# Patient Record
Sex: MALE | Race: BLACK OR AFRICAN AMERICAN | Employment: UNEMPLOYED | URBAN - METROPOLITAN AREA
[De-identification: names, ages, dates, MRNs, and addresses within clinical notes are randomized per-mention and may not be internally consistent; named-entity substitution may affect disease eponyms.]

---

## 2019-07-03 ENCOUNTER — OFFICE VISIT (OUTPATIENT)
Dept: ORTHOPEDIC SURGERY | Age: 39
End: 2019-07-03

## 2019-07-03 VITALS
WEIGHT: 180 LBS | RESPIRATION RATE: 18 BRPM | HEART RATE: 66 BPM | BODY MASS INDEX: 24.38 KG/M2 | OXYGEN SATURATION: 100 % | HEIGHT: 72 IN | DIASTOLIC BLOOD PRESSURE: 80 MMHG | SYSTOLIC BLOOD PRESSURE: 131 MMHG

## 2019-07-03 DIAGNOSIS — M47.817 LUMBOSACRAL SPONDYLOSIS WITHOUT MYELOPATHY: ICD-10-CM

## 2019-07-03 DIAGNOSIS — M51.26 HNP (HERNIATED NUCLEUS PULPOSUS), LUMBAR: ICD-10-CM

## 2019-07-03 DIAGNOSIS — M51.36 DDD (DEGENERATIVE DISC DISEASE), LUMBAR: ICD-10-CM

## 2019-07-03 DIAGNOSIS — M54.16 LUMBAR NEURITIS: ICD-10-CM

## 2019-07-03 DIAGNOSIS — M54.50 LUMBAR PAIN: Primary | ICD-10-CM

## 2019-07-03 NOTE — PROGRESS NOTES
[facility]  [address], [place] Sh[place]  Phone: [phone]  Fax: [phone]        INITIAL CONSULTATION      HISTORY OF PRESENT ILLNESS:  Nader Garcia is a 35 y.o. male whom is referred from Sunshine Garner MD  secondary to low back pain extending into the BLE in and L4 distribution to the knees since work injury in 2007, worse x 3 weeks. He rates his pain 4-9/10. Prior to the acute exacerbation, he states he would have rated his pain 4/10. Pt reports immediate onset of pain following injury. Originally through Workers comp but they have since settled. Pt denies h/o spinal surgery. Pt reports he had spinal injections 2 years ago. Pt went through physical therapy x 1 year ago with no relief. He previously did not tolerate Neurontin secondary to sedation. He did not tolerate Cymbalta secondary to hallucinations. Patient reports bladder incontinence x 1 year. Pt denies fever, weight loss, or skin changes. Lumbar spine MRI dated 8/14/16 reviewed. Per report, no central canal stenosis at any lumbar level. Multilevel degenerative changes and foraminal stenosis as described above. L5-S1: diffuse disc bulde, There is superimposed left posterolateral/foraminal disc protrusion. Moderate left foraminal stenosis. Protrusion abut the exiting left L5 nerve root. Appearance is similar to previous study. No central canal stenosis. The patient is RHD.  reviewed. Body mass index is 24.41 kg/m². PCP: Sunshine Garner MD    Past Medical History:   Diagnosis Date    Arthritis           Past Surgical History:   Procedure Laterality Date    HX VASECTOMY           Social History     Tobacco Use    Smoking status: Former Smoker    Smokeless tobacco: Never Used   Substance Use Topics    Alcohol use: Not Currently     Work status: The patient is unknown. Marital status: The patient is . No Known Allergies       History reviewed.  No pertinent family history. REVIEW OF SYSTEMS  Constitutional symptoms: Negative  Eyes: Negative  Ears, Nose, Throat, and Mouth: Negative  Cardiovascular: Negative  Respiratory: Negative  Genitourinary: Negative  Integumentary (Skin and/or breast): Negative  Musculoskeletal: Positive for low back pain, BLE pain  Extremities: Negative for edema. Endocrine/Rheumatologic: Negative  Hematologic/Lymphatic: Negative  Allergic/Immunologic: Negative  Psychiatric: Negative       PHYSICAL EXAMINATION  Visit Vitals  /80   Pulse 66   Resp 18   Ht 6' (1.829 m)   Wt 180 lb (81.6 kg)   SpO2 100%   BMI 24.41 kg/m²       CONSTITUTIONAL: NAD, A&O x 3  HEART: Regular rate and rhythm  ABDOMEN: Positive bowel sounds, soft, nontender, and nondistended  LUNGS: Clear to auscultation bilaterally. SKIN: Negative for rash. RANGE OF MOTION: The patient has full passive range of motion in all four extremities. SENSATION: Decreased sensation to light touch digits 1 and 5 RUE, L4 and S1 RLE. Sensation to light touch otherwise intact. MOTOR:   Straight Leg Raise: Negative, bilateral  Canela: Negative, bilateral  Deep tendon reflexes are 1+ at the brachioradialis, biceps, and triceps on the right, 2+ at the brachioradialis and triceps, 1+ at the biceps on the left. Deep tendon reflexes are 1+ at the knees and ankles bilaterally. Shoulder AB/Flex Elbow Flex Wrist Ext Elbow Ext Wrist Flex Hand Intrin Tone   Right +4/5 +4/5 +4/5 +4/5 +4/5 +4/5 +4/5   Left +4/5 +4/5 +4/5 +4/5 +4/5 +4/5 +4/5              Hip Flex Knee Ext Knee Flex Ankle DF GTE Ankle PF Tone   Right +4/5 +4/5 +4/5 +4/5 +4/5 +4/5 +4/5   Left +4/5 +4/5 +4/5 +4/5 +4/5 +4/5 +4/5   Ambulates with a single point cane. RADIOGRAPHS  Preliminary reading of lumbar plain films:  Mild disc space narrowing at L4 on L5 and L5 on S1. Small anterior osteophytes on L4- L5. No acute pathology identified. These are being sent out for official reading by Dr. Corby Clayton.      ASSESSMENT Diagnoses and all orders for this visit:    1. Lumbar pain  -     AMB POC XRAY, SPINE, LUMBOSACRAL; 2 O    2. HNP (herniated nucleus pulposus), lumbar    3. Lumbosacral spondylosis without myelopathy    4. Lumbar neuritis    5. DDD (degenerative disc disease), lumbar           IMPRESSIONS/RECOMMENDATIONS:  Patient presents today with low back pain extending into the BLE in and L4 distribution to the knees since work injury in 2007, worse x 3 weeks. I offered to start him on MDP. Pt declined. I will set him up for a new MRI of the lumbar spine. I advised patient to bring copies of films to next visit. I will have the patient sign a release of medical information to obtain office/block notes from Dr. Jeff Lousie. Multiple treatment options were discussed. Patient is neurologically intact. I will see the patient back following MRI. Written by Leonard Robles, as dictated by Terrie Oakley MD  I examined the patient, reviewed and agree with the note.

## 2019-07-03 NOTE — LETTER
7/3/19 Patient: Yadiel Marie YOB: 1980 Date of Visit: 7/3/2019 Sabi Grace MD 
[address] [place] [address] VIA Facsimile: [phone] Dear Sabi Grace MD, Thank you for referring Mr. Yadiel Marie to [address] [place] [address] for evaluation. My notes for this consultation are attached. If you have questions, please do not hesitate to call me. I look forward to following your patient along with you. Sincerely, Germain Huntley MD

## 2019-07-05 ENCOUNTER — DOCUMENTATION ONLY (OUTPATIENT)
Dept: ORTHOPEDIC SURGERY | Age: 39
End: 2019-07-05

## 2019-07-05 NOTE — PROGRESS NOTES
MRI Lumbar Spine without contrast is scheduled at [facility], [phone], on 07/10/19, arrive 2:45PM, test 3:00PM. Juan Pittman pre-authorization [id number], effective 07/05/19-09/03/19

## 2019-07-31 ENCOUNTER — OFFICE VISIT (OUTPATIENT)
Dept: ORTHOPEDIC SURGERY | Age: 39
End: 2019-07-31

## 2019-07-31 VITALS
SYSTOLIC BLOOD PRESSURE: 136 MMHG | OXYGEN SATURATION: 98 % | TEMPERATURE: 99.2 F | HEIGHT: 72 IN | BODY MASS INDEX: 24.52 KG/M2 | DIASTOLIC BLOOD PRESSURE: 83 MMHG | RESPIRATION RATE: 18 BRPM | WEIGHT: 181 LBS | HEART RATE: 85 BPM

## 2019-07-31 DIAGNOSIS — M47.817 LUMBOSACRAL SPONDYLOSIS WITHOUT MYELOPATHY: Primary | ICD-10-CM

## 2019-07-31 DIAGNOSIS — M51.26 OTHER INTERVERTEBRAL DISC DISPLACEMENT, LUMBAR REGION: ICD-10-CM

## 2019-07-31 DIAGNOSIS — M54.16 LUMBAR NEURITIS: ICD-10-CM

## 2019-07-31 NOTE — PROGRESS NOTES
[facility]  [address], [place]   Phone: [phone]  Fax: [phone]        PROGRESS NOTE      HISTORY OF PRESENT ILLNESS:  The patient is a 35 y.o. male and was seen today for follow up of low back pain extending into the BLE in and L4 distribution to the knees since work injury in 2007, worse x 3 weeks. Prior to the acute exacerbation, he reports his chronic baseline level of pain 4/10. Pt reports immediate onset of pain following injury. Originally through Workers comp but they have since settled. Pt denies h/o spinal surgery. Pt reports he had spinal injections 2 years ago. Pt went through physical therapy x 1 year ago with no relief. He previously did not tolerate Neurontin secondary to sedation. He did not tolerate Cymbalta secondary to hallucinations. Patient reports bladder incontinence x 1 year. Pt denies fever, weight loss, or skin changes. The patient is RHD. Lumbar spine MRI dated 8/14/16 reviewed. Per report, no central canal stenosis at any lumbar level. Multilevel degenerative changes and foraminal stenosis as described above. L5-S1: diffuse disc bulge, There is superimposed left posterolateral/foraminal disc protrusion. Moderate left foraminal stenosis. Protrusion abut the exiting left L5 nerve root. Appearance is similar to previous study. No central canal stenosis. Preliminary reading of lumbar plain films: Mild disc space narrowing at L4 on L5 and L5 on S1. Small anterior osteophytes on L4- L5. No acute pathology identified. At his last clinical appointment, patient presented with low back pain extending into the BLE in and L4 distribution to the knees since work injury in 2007, worse x 3 weeks. I offered to start him on MDP. Pt declined. I set him up for a new MRI of the lumbar spine. I had the patient sign a release of medical information to obtain office/block notes from Dr. Garry Emery.       The patient returns today low back pain extending into the RLE in and S1 distribution to the knee. He rates his pain 6/10, previously 4-9/10. Pt reports bladder incontinence x 1 year. Note from Love Kasper dated 12/20/08 indicating patient was seen with c/o lower back pain. Indicated he had improvement with PT. Note from Dr. Faustino Regan dated 5/18/16 indicating patient was seen with c/o back and leg pain. Indicated he was seen for a second opinion for his back and leg pain. Previously followed by [facility]. Original work injury 7/27/08. Indicated he had blocks x2 in 2013, x1 in 2014, and x1 in 2015. Indicated he had relief with those injections. Note from Dr. Fuastino Regan dated 8/16/16 indicating patient was seen with c/o low back and leg pain. Indicated his MRI showed evidence of left sided lumbar radiculopathy. Pt underwent left sided SNRB on 4/9/15 with Dr. Renu Solomon. Pt underwent right sided SNRB on 8/5/14 with Dr. Renu Solomon. Pt underwent left sided L4 SNRB on 12/12/13 with Dr. Renu Solomon. Pt underwent left sided L4 SNRB on 5/16/13 with Dr. Renu Solomon. Lumbar spine MRI dated 7/10/19 reviewed. Per report, degenerative changes in the 3 lower most lumbar levels as describes with no spinal stenosis.  reviewed. Body mass index is 24.55 kg/m².       PCP: Saeed Pearce MD      Past Medical History:   Diagnosis Date    Arthritis         Social History     Socioeconomic History    Marital status:      Spouse name: Not on file    Number of children: Not on file    Years of education: Not on file    Highest education level: Not on file   Occupational History    Not on file   Social Needs    Financial resource strain: Not on file    Food insecurity:     Worry: Not on file     Inability: Not on file    Transportation needs:     Medical: Not on file     Non-medical: Not on file   Tobacco Use    Smoking status: Former Smoker    Smokeless tobacco: Never Used   Substance and Sexual Activity    Alcohol use: Not Currently    Drug use: Not Currently    Sexual activity: Not on file   Lifestyle    Physical activity:     Days per week: Not on file     Minutes per session: Not on file    Stress: Not on file   Relationships    Social connections:     Talks on phone: Not on file     Gets together: Not on file     Attends Rastafarian service: Not on file     Active member of club or organization: Not on file     Attends meetings of clubs or organizations: Not on file     Relationship status: Not on file    Intimate partner violence:     Fear of current or ex partner: Not on file     Emotionally abused: Not on file     Physically abused: Not on file     Forced sexual activity: Not on file   Other Topics Concern     Service Not Asked    Blood Transfusions Not Asked    Caffeine Concern Not Asked    Occupational Exposure Not Asked    Hobby Hazards Not Asked    Sleep Concern Not Asked    Stress Concern Not Asked    Weight Concern Not Asked    Special Diet Not Asked    Back Care Not Asked    Exercise Not Asked    Bike Helmet Not Asked   [address] Not Asked    Self-Exams Not Asked   Social History Narrative    Not on file           No Known Allergies       PHYSICAL EXAMINATION    Visit Vitals  /83   Pulse 85   Temp 99.2 °F (37.3 °C)   Resp 18   Ht 6' (1.829 m)   Wt 181 lb (82.1 kg)   SpO2 98%   BMI 24.55 kg/m²       CONSTITUTIONAL: NAD, A&O x 3  SENSATION: Decreased sensation to light touch circumferentially RLE. Sensation to light touch otherwise intact. NEURO: Dell's is negative bilaterally. RANGE OF MOTION: The patient has full passive range of motion in all four extremities. MOTOR:  Straight Leg Raise: Negative, bilateral   Positive Mikal                Hip Flex Knee Ext Knee Flex Ankle DF GTE Ankle PF Tone   Right +4/5 +4/5 +4/5 +4/5 +4/5 +4/5 +4/5   Left +4/5 +4/5 +4/5 +4/5 +4/5 +4/5 +4/5     Ambulates with a single point cane. ASSESSMENT   Diagnoses and all orders for this visit:    1.  Lumbosacral spondylosis without myelopathy    2. Lumbar neuritis    3. Other intervertebral disc displacement, lumbar region          IMPRESSION AND PLAN:  The patient returns today low back pain extending into the RLE in and S1 distribution to the knee. Upon reviewing his films, I did not appreciate any spinal pathology to account for his right-sided sxs or bladder incontinence. I recommend he f/u with Lawanda Hansen MD for his bladder incontinence. His lower back pain may be due to SI joint as he had a positive drew. Patient wished to proceed with blocks at this time. I will order right SI joint Patient is neurologically intact. I will see the patient back following block. Written by Jesús Sprague, as dictated by Darek Reed MD  I examined the patient, reviewed and agree with the note.

## 2019-07-31 NOTE — LETTER
7/31/19 Patient: Lul Chaudhari YOB: 1980 Date of Visit: 7/31/2019 Kiesha Jin MD 
[address] [place] [address] VIA Facsimile: [phone] Dear Kiesha Jin MD, Thank you for referring Mr. Lul Chaudhari to [address] [place] [address] for evaluation. My notes for this consultation are attached. If you have questions, please do not hesitate to call me. I look forward to following your patient along with you. Sincerely, Gasper Yeager MD

## 2019-08-22 ENCOUNTER — APPOINTMENT (OUTPATIENT)
Dept: GENERAL RADIOLOGY | Age: 39
End: 2019-08-22
Attending: PHYSICAL MEDICINE & REHABILITATION
Payer: COMMERCIAL

## 2019-08-22 ENCOUNTER — HOSPITAL ENCOUNTER (OUTPATIENT)
Age: 39
Setting detail: OUTPATIENT SURGERY
Discharge: HOME | End: 2019-08-22
Attending: PHYSICAL MEDICINE & REHABILITATION | Admitting: PHYSICAL MEDICINE & REHABILITATION
Payer: COMMERCIAL

## 2019-08-22 VITALS
BODY MASS INDEX: 24.52 KG/M2 | TEMPERATURE: 98.6 F | WEIGHT: 181 LBS | OXYGEN SATURATION: 96 % | HEIGHT: 72 IN | HEART RATE: 71 BPM | DIASTOLIC BLOOD PRESSURE: 74 MMHG | RESPIRATION RATE: 16 BRPM | SYSTOLIC BLOOD PRESSURE: 116 MMHG

## 2019-08-22 PROCEDURE — 74011250636 HC RX REV CODE- 250/636: Performed by: PHYSICAL MEDICINE & REHABILITATION

## 2019-08-22 PROCEDURE — 76010000009 HC PAIN MGT 0 TO 30 MIN PROC: Performed by: PHYSICAL MEDICINE & REHABILITATION

## 2019-08-22 PROCEDURE — 74011250637 HC RX REV CODE- 250/637: Performed by: PHYSICAL MEDICINE & REHABILITATION

## 2019-08-22 PROCEDURE — 77030039433 HC TY MYLEOGRAM BD -B: Performed by: PHYSICAL MEDICINE & REHABILITATION

## 2019-08-22 PROCEDURE — 77030003676 HC NDL SPN MPRI -A: Performed by: PHYSICAL MEDICINE & REHABILITATION

## 2019-08-22 RX ORDER — DIAZEPAM 5 MG/1
5-20 TABLET ORAL ONCE
Status: COMPLETED | OUTPATIENT
Start: 2019-08-22 | End: 2019-08-22

## 2019-08-22 RX ORDER — LIDOCAINE HYDROCHLORIDE 10 MG/ML
INJECTION, SOLUTION EPIDURAL; INFILTRATION; INTRACAUDAL; PERINEURAL AS NEEDED
Status: DISCONTINUED | OUTPATIENT
Start: 2019-08-22 | End: 2019-08-22 | Stop reason: HOSPADM

## 2019-08-22 RX ORDER — DEXAMETHASONE SODIUM PHOSPHATE 100 MG/10ML
INJECTION INTRAMUSCULAR; INTRAVENOUS AS NEEDED
Status: DISCONTINUED | OUTPATIENT
Start: 2019-08-22 | End: 2019-08-22 | Stop reason: HOSPADM

## 2019-08-22 RX ADMIN — DIAZEPAM 10 MG: 5 TABLET ORAL at 12:30

## 2019-08-22 NOTE — DISCHARGE INSTRUCTIONS
[facility]   (NESS)  Dr. Shaheen Atwood, Dr. Jh Rosenberg, Dr. Raul Castellanos not drive a car, operate heavy machinery or dangerous equipment for 24 hours. * Activity as tolerated; rest for the remainder of the day. * Resume pre-block medications including those for your family doctor. * Do not drink alcoholic beverages for 24 hours. Alcohol and the medications you have received may interact and cause an adverse reaction. * You may feel better this evening and worse tomorrow, as the numbing medications wears off and the steroid has yet to begin to work. After 48 hrs the steroid should begin to release bringing you relief. * You may shower this evening and remove any bandages. * Avoid hot tubs and heating pads for 24 hours. You may use cold packs on the procedure site as tolerated for the first 24 hours. * If a headache develops, drink plenty of fluids and rest.  Take over the counter medications for headache if needed. If the headache continues longer than 24 hours, call MD at the [address] [place] [address]. [phone]    * Continue taking pain medications as needed. * You may resume your regular diet if tolerated. Otherwise, start with sips of water and advance slowly. * If Diabetic: check your blood sugar three times a day for the next 3 days. If your sugar is greater than 300 call your family doctor. If your sugar is greater than 400, have someone transport you to the nearest Emergency Room. * If you experience any of the following problems, Please Call the [address] [place] [address] at [phone].         * Shortness of Breath    * Fever of 101 or higher    * Nausea / Vomiting    * Severe Headache    * Weakness or numbness in arms or legs that is not      resolving    * Prolonged increase in pain greater than 4 days      DISCHARGE SUMMARY from Nurse      PATIENT INSTRUCTIONS:    After oral sedation, for 24 hours or while taking prescription Narcotics:  · Limit your activities  · Do not drive and operate hazardous machinery  · Do not make important personal or business decisions  · Do  not drink alcoholic beverages  · If you have not urinated within 8 hours after discharge, please contact your surgeon on call. Report the following to your surgeon:  · Excessive pain, swelling, redness or odor of or around the surgical area  · Temperature over 101  · Nausea and vomiting lasting longer than 4 hours or if unable to take medications  · Any signs of decreased circulation or nerve impairment to extremity: change in color, persistent  numbness, tingling, coldness or increase pain  · Any questions            What to do at Home:  Recommended activity: Activity as tolerated, NO DRIVING FOR 12 Hours post injection          *  Please give a list of your current medications to your Primary Care Provider. *  Please update this list whenever your medications are discontinued, doses are      changed, or new medications (including over-the-counter products) are added. *  Please carry medication information at all times in case of emergency situations. These are general instructions for a healthy lifestyle:    No smoking/ No tobacco products/ Avoid exposure to second hand smoke    Surgeon General's Warning:  Quitting smoking now greatly reduces serious risk to your health. Obesity, smoking, and sedentary lifestyle greatly increases your risk for illness    A healthy diet, regular physical exercise & weight monitoring are important for maintaining a healthy lifestyle    You may be retaining fluid if you have a history of heart failure or if you experience any of the following symptoms:  Weight gain of 3 pounds or more overnight or 5 pounds in a week, increased swelling in our hands or feet or shortness of breath while lying flat in bed.   Please call your doctor as soon as you notice any of these symptoms; do not wait until your next office visit. Recognize signs and symptoms of STROKE:    F-face looks uneven    A-arms unable to move or move unevenly    S-speech slurred or non-existent    T-time-call 911 as soon as signs and symptoms begin-DO NOT go       Back to bed or wait to see if you get better-TIME IS BRAIN.

## 2019-08-22 NOTE — PROCEDURES
Procedure Note    Patient Name: Mandie Smith. Date of Procedure: August 22, 2019    Preoperative Diagnosis: Sacrilitis    Post Operative Diagnosis: same    Procedure: SI Joint Injection right     Consent: Informed consent was obtained prior to the procedure. The patient was given the opportunity to ask questions regarding the procedure and its associated risks. In addition to the potential risks associated with the procedure itself, the patient was informed both verbally and in writing of potential side effects of the use of glucocorticoids. The patient appeared to comprehend the informed consent and desired to have the procedure perfored. Procedure: The patient was placed in the prone position on the flouroscopy table and the back was prepped and draped in the usual sterile manner. A #22 gauge spinal needle was then advanced to lie within the SI joint after local Lidocaine 1% injection. A total of 30 mg of preservative free Dexamethasone and 4 cc of Lidocaine was introduced into the SI joint. The injection area was cleaned and bandaids applied. No excessive bleeding was noted. Patient dressed and was discharged to home with instructions. Discussion: The patient tolerated the procedure well.      Norm Cordero MD  August 22, 2019

## 2019-08-23 ENCOUNTER — TELEPHONE (OUTPATIENT)
Dept: ORTHOPEDIC SURGERY | Age: 39
End: 2019-08-23

## 2019-08-23 NOTE — TELEPHONE ENCOUNTER
08-   FORM WAS BROUGHT IN , SAID IT WAS FOR PATIENTS ATTORNEYS. PLEASE FILL OUT AND PATIENT WILL  AT HIS APPOINTMENT.

## 2019-08-23 NOTE — TELEPHONE ENCOUNTER
Patient called stating that he needed to talk to Dr. Meri Francis about pain medication. He asked to receive a call back at [phone].

## 2019-08-26 ENCOUNTER — DOCUMENTATION ONLY (OUTPATIENT)
Dept: ORTHOPEDIC SURGERY | Age: 39
End: 2019-08-26

## 2019-08-26 NOTE — TELEPHONE ENCOUNTER
Called patient regarding previous message. Patient states he has already spoken to Dr. Susan Sanchez.

## 2019-08-29 ENCOUNTER — TELEPHONE (OUTPATIENT)
Dept: ORTHOPEDIC SURGERY | Age: 39
End: 2019-08-29

## 2019-08-29 NOTE — TELEPHONE ENCOUNTER
Patient is aware he can take a shower at what ever temperature he would like and it will take up to 2-3 weeks for benefit.

## 2019-08-29 NOTE — TELEPHONE ENCOUNTER
He can take hot showers. Temperature should be what is comfortable for him.   The block can take 2-3 weeks for full effect

## 2019-08-29 NOTE — TELEPHONE ENCOUNTER
Patient is calling as he has some questions reference can he take hot shower when he started with some pain. What temperature of shower should he take? Patient has had some pain and how long does it take for the block to take full affect? Does have appt  09/04 with Dr. Karlo Barrios at [facility].   Please call patient back at [phone]

## 2019-09-04 ENCOUNTER — OFFICE VISIT (OUTPATIENT)
Dept: ORTHOPEDIC SURGERY | Age: 39
End: 2019-09-04

## 2019-09-04 VITALS
BODY MASS INDEX: 25.33 KG/M2 | RESPIRATION RATE: 18 BRPM | WEIGHT: 187 LBS | HEART RATE: 60 BPM | OXYGEN SATURATION: 100 % | SYSTOLIC BLOOD PRESSURE: 129 MMHG | HEIGHT: 72 IN | DIASTOLIC BLOOD PRESSURE: 80 MMHG

## 2019-09-04 DIAGNOSIS — M54.50 LUMBAR PAIN: Primary | ICD-10-CM

## 2019-09-04 RX ORDER — KETOROLAC TROMETHAMINE 10 MG/1
TABLET, FILM COATED ORAL
COMMUNITY
End: 2021-12-17

## 2019-09-04 NOTE — LETTER
9/4/19 Patient: Dheeraj Correa YOB: 1980 Date of Visit: 9/4/2019 Soco Clayton MD 
[address] [place] [address] VIA Facsimile: [phone] Dear Soco Clayton MD, Thank you for referring Mr. Dheeraj Correa to [address] [place] [address] for evaluation. My notes for this consultation are attached. If you have questions, please do not hesitate to call me. I look forward to following your patient along with you. Sincerely, Salud Rivera MD

## 2019-09-04 NOTE — PROGRESS NOTES
[facility]  [address], [place]   Phone: [phone]  Fax: [phone]        PROGRESS NOTE      HISTORY OF PRESENT ILLNESS:  The patient is a 35 y.o. male and was seen today for follow up of low back pain extending into the RLE in and S1 distribution to the knee. Initially had c/o low back pain extending into the BLE in and L4 distribution to the knees since work injury in 2007, worse x 3 weeks. Prior to the acute exacerbation, he reports his chronic baseline level of pain 4/10. Pt reports immediate onset of pain following injury. Originally through Workers comp but they have since settled. Pt denies h/o spinal surgery. Pt reports he had spinal injections 2 years ago. Pt went through physical therapy x 1 year ago with no relief. He previously did not tolerate Neurontin secondary to sedation. He did not tolerate Cymbalta secondary to hallucinations. Patient reports bladder incontinence x 1 year. Pt denies fever, weight loss, or skin changes. The patient is RHD. Note from Vitaliy Cruz dated 12/20/08 indicating patient was seen with c/o lower back pain. Indicated he had improvement with PT. Note from Dr. Beth Rodriguez dated 5/18/16 indicating patient was seen with c/o back and leg pain. Indicated he was seen for a second opinion for his back and leg pain. Previously followed by [facility]. Original work injury 7/27/08. Indicated he had blocks x2 in 2013, x1 in 2014, and x1 in 2015. Indicated he had relief with those injections. Note from Dr. Beth Rodriguez dated 8/16/16 indicating patient was seen with c/o low back and leg pain. Indicated his MRI showed evidence of left sided lumbar radiculopathy. Pt underwent left sided SNRB on 4/9/15 with Dr. Giancarlo Platt. Pt underwent right sided SNRB on 8/5/14 with Dr. Giancarlo Platt. Pt underwent left sided L4 SNRB on 12/12/13 with Dr. Giancarlo Platt. Pt underwent left sided L4 SNRB on 5/16/13 with Dr. Giancarlo Platt.  Preliminary reading of lumbar plain films: Mild disc space narrowing at L4 on L5 and L5 on S1. Small anterior osteophytes on L4- L5. No acute pathology identified. Lumbar spine MRI dated 7/10/19 reviewed. Per report, degenerative changes in the 3 lower most lumbar levels as describes with no spinal stenosis. At his last clinical appointment, the patient returned with low back pain extending into the RLE in and S1 distribution to the knee. Upon reviewing his films, I did not appreciate any spinal pathology to account for his right-sided sxs or bladder incontinence. I recommended he f/u with Giovanni Cockayne, MD for his bladder incontinence. His lower back pain may be due to SI joint as he had a positive Niurka Limes. Patient wished to proceed with blocks at that time. I order right SI joint injection. The patient returns today with pain location and distribution remain unchanged. He rates his pain 3/10, previously 6/10. Pt reports his decrease in pain was due to Toradol he has been taking, not the SI joint injection. Pt has not been able to schedule an appointment with Giovanni Cockayne, MD for his bladder incontinence. He continues to report bladder incontinence x 1 year. Pt underwent right SI joint injection on 8/22/19 with no relief.  reviewed. Body mass index is 25.36 kg/m².       PCP: Giovanni Cockayne, MD      Past Medical History:   Diagnosis Date    Arthritis         Social History     Socioeconomic History    Marital status:      Spouse name: Not on file    Number of children: Not on file    Years of education: Not on file    Highest education level: Not on file   Occupational History    Not on file   Social Needs    Financial resource strain: Not on file    Food insecurity:     Worry: Not on file     Inability: Not on file    Transportation needs:     Medical: Not on file     Non-medical: Not on file   Tobacco Use    Smoking status: Former Smoker    Smokeless tobacco: Never Used   Substance and Sexual Activity  Alcohol use: Not Currently    Drug use: Not Currently    Sexual activity: Not on file   Lifestyle    Physical activity:     Days per week: Not on file     Minutes per session: Not on file    Stress: Not on file   Relationships    Social connections:     Talks on phone: Not on file     Gets together: Not on file     Attends Quaker service: Not on file     Active member of club or organization: Not on file     Attends meetings of clubs or organizations: Not on file     Relationship status: Not on file    Intimate partner violence:     Fear of current or ex partner: Not on file     Emotionally abused: Not on file     Physically abused: Not on file     Forced sexual activity: Not on file   Other Topics Concern     Service Not Asked    Blood Transfusions Not Asked    Caffeine Concern Not Asked    Occupational Exposure Not Asked   [place] Hazards Not Asked    Sleep Concern Not Asked    Stress Concern Not Asked    Weight Concern Not Asked    Special Diet Not Asked    Back Care Not Asked    Exercise Not Asked    Bike Helmet Not Asked   [address] Not Asked    Self-Exams Not Asked   Social History Narrative    Not on file       Current Outpatient Medications   Medication Sig Dispense Refill    ketorolac (TORADOL) 10 mg tablet Take  by mouth. Allergies   Allergen Reactions    Banana Hives          PHYSICAL EXAMINATION    Visit Vitals  /80   Pulse 60   Resp 18   Ht 6' (1.829 m)   Wt 84.8 kg (187 lb)   SpO2 100%   BMI 25.36 kg/m²       CONSTITUTIONAL: NAD, A&O x 3  SENSATION: Intact to light touch throughout  RANGE OF MOTION: The patient has full passive range of motion in all four extremities. MOTOR:  Straight Leg Raise: Negative, bilateral               Hip Flex Knee Ext Knee Flex Ankle DF GTE Ankle PF Tone   Right +4/5 +4/5 +4/5 +4/5 +4/5 +4/5 +4/5   Left +4/5 +4/5 +4/5 +4/5 +4/5 +4/5 +4/5       ASSESSMENT   Diagnoses and all orders for this visit:    1.  Lumbar pain  - REFERRAL TO PAIN MANAGEMENT          IMPRESSION AND PLAN:  Patient is s/p a right SI joint injection, noting no relief. Based on diagnostic testing, I did not appreciate a spinal pathology to account for his sxs. I will refer him to pain management. I will also set him up for an appointment with Noni Davis MD for the bladder incontinence. Patient is neurologically intact. I will see the patient back prn. Written by Nguyen Jimenez, as dictated by Luke Belle MD  I examined the patient, reviewed and agree with the note.

## 2020-06-01 ENCOUNTER — OFFICE VISIT (OUTPATIENT)
Dept: ORTHOPEDIC SURGERY | Age: 40
End: 2020-06-01

## 2020-06-01 VITALS
WEIGHT: 188.2 LBS | DIASTOLIC BLOOD PRESSURE: 84 MMHG | HEART RATE: 83 BPM | BODY MASS INDEX: 25.49 KG/M2 | SYSTOLIC BLOOD PRESSURE: 114 MMHG | HEIGHT: 72 IN | OXYGEN SATURATION: 97 %

## 2020-06-01 DIAGNOSIS — M47.817 LUMBOSACRAL SPONDYLOSIS WITHOUT MYELOPATHY: ICD-10-CM

## 2020-06-01 DIAGNOSIS — M51.36 DDD (DEGENERATIVE DISC DISEASE), LUMBAR: ICD-10-CM

## 2020-06-01 DIAGNOSIS — M54.50 LOW BACK PAIN AT MULTIPLE SITES: Primary | ICD-10-CM

## 2020-06-01 DIAGNOSIS — M54.16 LUMBAR NEURITIS: ICD-10-CM

## 2020-06-01 RX ORDER — PREDNISONE 20 MG/1
TABLET ORAL
COMMUNITY
Start: 2020-05-15 | End: 2020-11-11 | Stop reason: ALTCHOICE

## 2020-06-01 RX ORDER — GABAPENTIN 300 MG/1
300 CAPSULE ORAL 3 TIMES DAILY
Qty: 90 CAP | Refills: 1 | Status: SHIPPED | OUTPATIENT
Start: 2020-06-01 | End: 2021-12-17

## 2020-06-01 RX ORDER — GABAPENTIN 300 MG/1
CAPSULE ORAL
COMMUNITY
Start: 2020-05-15 | End: 2020-08-21 | Stop reason: SDUPTHER

## 2020-06-01 NOTE — PROGRESS NOTES
[facility]  [address], [place]   Phone: [phone]  Fax: [phone]        PROGRESS NOTE      HISTORY OF PRESENT ILLNESS:  The patient is a 35 y.o. male and was seen today for follow up of low back pain radiating into the RLE in an L5-S1 distribution to the knee. Initially had c/o low back pain radiating into the BLE in and L4 distribution to the knees since work injury in 2007, worse x 3 weeks. Prior to the acute exacerbation, he reports his chronic baseline level of pain 4/10. Pt reports immediate onset of pain following injury. Originally through Workers comp but they have since settled. Pt treats with Toradol, with relief. Pt denies h/o spinal surgery. Pt reports he had spinal injections x 2017+. Pt underwent right SI joint injection on 8/22/19 with no relief. Pt went through physical therapy x 1 year ago with no relief. He previously did not tolerate NEURONTIN secondary to sedation. He did not tolerate Cymbalta secondary to hallucinations. Patient reports an episode of bladder incontinence x 7/2019. He later clarified he could not make it to the bathroom in time. Pt denies fever, weight loss, or skin changes. The patient is RHD. Note from Katlin Contreras NP dated 12/20/08 indicating patient was seen with c/o lower back pain. Indicated he had improvement with PT. Note from Dr. Blanche Jenkins 5/18/16 indicating patient was seen with c/o back and leg pain. Indicated he was seen for a second opinion for his back and leg pain. Previously followed [facility]. Original work injury 7/27/08. Indicated he had blocks x2 in 2013, x1 in 2014, and x1 in 2015. Indicated he had relief with those injections. Note from Dr. Blanche Jenkins 8/16/16 indicating patient was seen with c/o low back and leg pain. Indicated his MRI showed evidence of left sided lumbar radiculopathy. Pt underwent left sided SNRB on 4/9/15 with Dr. Taqueria Bryant.  Pt underwent right sided Nolberto Juarez. Pt underwent left sided L4 SNRB on 12/12/13 with Dr. Zack Juarez. Pt underwent left sided L4 SNRB on 5/16/13 with Dr. Jitendra Meraz reading of lumbar plain films: Mild disc space narrowing at L4 on L5 and L5 on S1. Small anterior osteophytes on L4- L5. No acute pathology identified. Lumbar spine MRI dated 7/10/19 reviewed. Per report, degenerative changes in the 3 lower most lumbar levels as describes with no spinal stenosis. At his last clinical appointment, I referred him to Dr. Maye Barrera for pain management. I also set him up for an appointment with Michael Zhu MD for the bladder incontinence.       The patient returns today with pain location and distribution remains unchanged. He rates his pain 5/10, previously 3/10. He indicates a severe episode of pain on 5/15/2020 without trauma. Prior to this episode he indicates his baseline pain level was 2/10. His pain is not exacerbated positionally. Since last visit, he did not report to Dr. Benitez [facility] pain management as he experienced some improvement in his symptoms. Pt denies h/o hip surgery. Pt denies change in bowel or bladder habits. ER note from Dr. Dov Mccarty dated 5/15/20 indicating patient presented for back pain radiating into the BLE (L>R), which started 1 day prior. Indicated he previously was on Neurontin, but noted he ran out x 1 year ago. He previously treated with oxycodone and acetaminophen without relief. Restarted his Neurontin at 300 mg qhs, provided him a Rx for prednisone, and treated him with a Toradol injection.  reviewed. Body mass index is 25.52 kg/m².     PCP: Michael Zhu MD      Past Medical History:   Diagnosis Date    Arthritis         Social History     Socioeconomic History    Marital status:      Spouse name: Not on file    Number of children: Not on file    Years of education: Not on file    Highest education level: Not on file   Occupational History    Not on file Social Needs    Financial resource strain: Not on file    Food insecurity     Worry: Not on file     Inability: Not on file    Transportation needs     Medical: Not on file     Non-medical: Not on file   Tobacco Use    Smoking status: Former Smoker    Smokeless tobacco: Never Used   Substance and Sexual Activity    Alcohol use: Not Currently    Drug use: Not Currently    Sexual activity: Not on file   Lifestyle    Physical activity     Days per week: Not on file     Minutes per session: Not on file    Stress: Not on file   Relationships    Social connections     Talks on phone: Not on file     Gets together: Not on file     Attends Hinduism service: Not on file     Active member of club or organization: Not on file     Attends meetings of clubs or organizations: Not on file     Relationship status: Not on file    Intimate partner violence     Fear of current or ex partner: Not on file     Emotionally abused: Not on file     Physically abused: Not on file     Forced sexual activity: Not on file   Other Topics Concern     Service Not Asked    Blood Transfusions Not Asked    Caffeine Concern Not Asked    Occupational Exposure Not Asked   [place] Hazards Not Asked    Sleep Concern Not Asked    Stress Concern Not Asked    Weight Concern Not Asked    Special Diet Not Asked    Back Care Not Asked    Exercise Not Asked    Bike Helmet Not Asked    Seat Belt Not Asked    Self-Exams Not Asked   Social History Narrative    Not on file       Current Outpatient Medications   Medication Sig Dispense Refill    predniSONE (DELTASONE) 20 mg tablet TAKE 2 TABLETS BY MOUTH ONCE DAILY FOR 5 DAYS      gabapentin (NEURONTIN) 300 mg capsule TAKE 1 CAPSULE BY MOUTH AT BEDTIME FOR 10 DAYS      ketorolac (TORADOL) 10 mg tablet Take  by mouth.          Allergies   Allergen Reactions    Banana Hives          PHYSICAL EXAMINATION    Visit Vitals  /84 (BP 1 Location: Left arm, BP Patient Position: Sitting)   Pulse 83   Ht 6' (1.829 m)   Wt 188 lb 3.2 oz (85.4 kg)   SpO2 97%   BMI 25.52 kg/m²       CONSTITUTIONAL: NAD, A&O x 3  SENSATION: Intact to light touch throughout  RANGE OF MOTION: The patient has full passive range of motion in all four extremities. MOTOR:  Straight Leg Raise: Negative, bilateral    YUNIER SIGN: questionably positive, right   Mild increase in discomfort with increased ROM of the RLE  No increased pain with direct palpation of the SI joint    Ambulates with a single point cane     Hip Flex Knee Ext Knee Flex Ankle DF GTE Ankle PF Tone   Right +4/5 +4/5 +4/5 +4/5 +4/5 +4/5 +4/5   Left +4/5 +4/5 +4/5 +4/5 +4/5 +4/5 +4/5   RADIOGRAPHS  Preliminary reading of L Spine plain films dated 6/1/2020 revealed:  Mild disc space narrowing at L5-S1. Small anterior osteophytes noted at L5. No acute pathology identified. These are being sent out for official reading by Dr. Carolyn Kohler. ASSESSMENT   Diagnoses and all orders for this visit:    1. Low back pain at multiple sites  -     AMB POC XRAY, SPINE, LUMBOSACRAL; 2 O    2. Lumbosacral spondylosis without myelopathy    3. Lumbar neuritis    4. DDD (degenerative disc disease), lumbar      IMPRESSION AND PLAN:  Patient returns to the office today with c/o low back pain radiating into the RLE in an L5-S1 distribution to the knee. Multiple treatment options were discussed. I will refer him to neurology for a RLE EMG. He previously tolerated Neurontin with some relief, I will restart his Neurontin at  300 mg TID. Patient advised to call the office if intolerant to medication. Patient is neurologically intact. I will see the patient back following EMG or earlier if needed. Written by Stuart Anthony, as dictated by Nora Correia MD  I examined the patient, reviewed and agree with the note.

## 2020-06-01 NOTE — LETTER
6/1/20 Patient: Ld Schulz YOB: 1980 Date of Visit: 6/1/2020 Starla [place] MD 
[address] [place], [place] [id number] VIA Facsimile: [phone] Dear Starla [place] MD, Thank you for referring Mr. Ld Schulz to [address] for evaluation. My notes for this consultation are attached. If you have questions, please do not hesitate to call me. I look forward to following your patient along with you. Sincerely, Delfin Partida MD

## 2020-06-08 ENCOUNTER — DOCUMENTATION ONLY (OUTPATIENT)
Dept: ORTHOPEDIC SURGERY | Age: 40
End: 2020-06-08

## 2020-06-08 NOTE — PROGRESS NOTES
EMG RLE is scheduled with Dr. Bryan Ortiz, [address] [place] [address], [address], [phone], on 07/02/20, arrive 2:00PM, test 2:30PM.

## 2020-07-23 ENCOUNTER — TELEPHONE (OUTPATIENT)
Dept: ORTHOPEDIC SURGERY | Age: 40
End: 2020-07-23

## 2020-07-23 NOTE — TELEPHONE ENCOUNTER
Reached voice mail identified as \"Brother Bryce Walters" so left detailed message with my direct number. Attempting to schedule EMG RLE. Referral sent to Dr. Adrianne Truong office but they no longer accept his insurance. Thank you.

## 2020-08-19 ENCOUNTER — OFFICE VISIT (OUTPATIENT)
Dept: ORTHOPEDIC SURGERY | Age: 40
End: 2020-08-19

## 2020-08-19 VITALS
RESPIRATION RATE: 14 BRPM | SYSTOLIC BLOOD PRESSURE: 125 MMHG | DIASTOLIC BLOOD PRESSURE: 75 MMHG | OXYGEN SATURATION: 100 % | HEIGHT: 72 IN | BODY MASS INDEX: 27.09 KG/M2 | TEMPERATURE: 98.9 F | HEART RATE: 63 BPM | WEIGHT: 200 LBS

## 2020-08-19 DIAGNOSIS — R20.2 NUMBNESS AND TINGLING OF RIGHT LOWER EXTREMITY: Primary | ICD-10-CM

## 2020-08-19 DIAGNOSIS — R20.0 NUMBNESS AND TINGLING OF RIGHT LOWER EXTREMITY: Primary | ICD-10-CM

## 2020-08-19 NOTE — PROGRESS NOTES
[address]  [address]  [address]  Phone: [phone]  Fax: [phone]        Cy Schaffer  : 1980  PCP: Cierra Owens MD  2020    ELECTROMYOGRAPHY AND NERVE CONDUCTION STUDIES    Stefanie Silveira was referred by Dr. Angelica Calvin for electrodiagnostic evaluation of RLE paraesthesia. NCV & EMG Findings:  All nerve conduction studies (as indicated in the following tables) were within normal limits. All examined muscles (as indicated in the following table) showed no evidence of electrical instability. INTERPRETATION    This was a normal nerve conduction and EMG study showing there to be no signs of neuropathy, myopathy, or radiculopathy in the nerves and muscles tested. CLINICAL INTERPRETATION  His electrodiagnostic findings do not appear to provide an explanation for his right leg symptoms. HISTORY OF PRESENT ILLNESS  Stefanie Silveira is a 38 y.o. male. Pt presents today for RLE EMG evaluation of RLE paraesthesia in an L5-S1 distribution to the knee. Pt denies h/o spine surgery. Pt ambulates with a single point cane. PAST MEDICAL HISTORY   Past Medical History:   Diagnosis Date    Arthritis        Past Surgical History:   Procedure Laterality Date    HX VASECTOMY     . MEDICATIONS    Current Outpatient Medications   Medication Sig Dispense Refill    predniSONE (DELTASONE) 20 mg tablet TAKE 2 TABLETS BY MOUTH ONCE DAILY FOR 5 DAYS      gabapentin (NEURONTIN) 300 mg capsule TAKE 1 CAPSULE BY MOUTH AT BEDTIME FOR 10 DAYS      gabapentin (NEURONTIN) 300 mg capsule Take 1 Cap by mouth three (3) times daily. Max Daily Amount: 900 mg. 90 Cap 1    ketorolac (TORADOL) 10 mg tablet Take  by mouth.           ALLERGIES  Allergies   Allergen Reactions    Banana Hives          SOCIAL HISTORY    Social History     Socioeconomic History    Marital status:      Spouse name: Not on file    Number of children: Not on file    Years of education: Not on file    Highest education level: Not on file   Tobacco Use    Smoking status: Former Smoker    Smokeless tobacco: Never Used   Substance and Sexual Activity    Alcohol use: Not Currently    Drug use: Not Currently   Other Topics Concern       FAMILY HISTORY  No family history on file. PHYSICAL EXAMINATION  Visit Vitals  /75   Pulse 63   Temp 98.9 °F (37.2 °C) (Oral)   Resp 14   Ht 6' (1.829 m)   Wt 200 lb (90.7 kg)   SpO2 100%   BMI 27.12 kg/m²       Pain Assessment  6/1/2020   Location of Pain Back; Hip   Location Modifiers Right   Severity of Pain 5   Quality of Pain Sharp;Dull   Duration of Pain Persistent   Frequency of Pain Intermittent   Aggravating Factors Standing;Walking; Other (Comment)   Aggravating Factors Comment sitting too long   Limiting Behavior Yes   Relieving Factors Nothing   Relieving Factors Comment -   Result of Injury Yes   Work-Related Injury Yes           Constitutional:  Well developed, well nourished, in no acute distress. Psychiatric: Affect and mood are appropriate. Integumentary: No rashes or abrasions noted on exposed areas. SPINE/MUSCULOSKELETAL EXAM    On brief examination: None.       NCV & EMG Findings:  Nerve Conduction Studies  Anti Sensory Summary Table     Stim Site NR Peak (ms) Norm Peak (ms) O-P Amp (µV) Norm O-P Amp Site1 Site2 Delta-P (ms) Dist (cm) Eduar (m/s) Norm Eduar (m/s)   Right Sup Fibular Anti Sensory (Ant Lat Mall)   14 cm    3.5 <4.4 11.8 >5.0 14 cm Ant Lat Mall 3.5 14.0 40 >32   Right Sural Anti Sensory (Lat Mall)   Calf    3.7 <4.0 8.8 >5.0 Calf Lat Mall 3.7 14.0 38 >35     Motor Summary Table     Stim Site NR Onset (ms) Norm Onset (ms) O-P Amp (mV) Norm O-P Amp Site1 Site2 Delta-0 (ms) Dist (cm) Eduar (m/s) Norm Eduar (m/s)   Right Fibular Motor (Ext Dig Brev)   Ankle    4.7 <6.1 9.3 >2.5 B Fib Ankle 7.3 35.0 48 >38   B Fib    12.0  8.2  Poplt B Fib 1.2 6.0 50 >40   Poplt    13.2  7.8 Right Tibial Motor (Abd Solorzano Brev)   Ankle    4.8 <6.1 11.4 >3.0 Knee Ankle 9.1 39.5 43 >35   Knee    13.9  9.1            EMG     Side Muscle Nerve Root Ins Act Fibs Psw Amp Dur Poly Recrt Int Anthony Hoops Comment   Right VastusMed Femoral L2-4 Nml Nml Nml Nml Nml 0 Nml Nml    Right AntTibialis Dp Br Fibular L4-5 Nml Nml Nml Nml Nml 0 Nml Nml    Right Gastroc Tibial S1-2 Nml Nml Nml Nml Nml 0 Nml Nml    Right Lumbo Parasp Up Rami L1-2 Nml Nml Nml         Right Lumbo Parasp Mid Rami L3-4 Nml Nml Nml         Right Lumbo Parasp Low Rami L5-S1 Nml Nml Nml         Right ExtHallLong Dp Br Fibular L5, S1 Nml Nml Nml Nml Nml 0 Nml Nml    Right PostTibialis Tibial L5, S1 Nml Nml Nml Nml Nml 0 Nml Nml        Nerve Conduction Studies  Anti Sensory Left/Right Comparison     Stim Site L Lat (ms) R Lat (ms) L-R Lat (ms) L Amp (µV) R Amp (µV) L-R Amp (%) Site1 Site2 L Eduar (m/s) R Eduar (m/s) L-R Eduar (m/s)   Sup Fibular Anti Sensory (Ant Lat Mall)   14 cm  3.5   11.8  14 cm Ant Lat Mall  40    Sural Anti Sensory (Lat Mall)   Calf  3.7   8.8  Calf Lat Mall  38      Motor Left/Right Comparison     Stim Site L Lat (ms) R Lat (ms) L-R Lat (ms) L Amp (mV) R Amp (mV) L-R Amp (%) Site1 Site2 L Eduar (m/s) R Eduar (m/s) L-R Eduar (m/s)   Fibular Motor (Ext Dig Brev)   Ankle  4.7   9.3  B Fib Ankle  48    B Fib  12.0   8.2  Poplt B Fib  50    Poplt  13.2   7.8         Tibial Motor (Abd Solorzano Brev)   Ankle  4.8   11.4  Knee Ankle  43    Knee  13.9   9.1               Waveforms:                    [facility]  OFFICE PROCEDURE PROGRESS NOTE        Chart reviewed for the following:   I, Ann Marie Holms, have reviewed the History, Physical and updated the Allergic reactions for [place] performed immediately prior to start of procedure:   Ann Marie MONTANEZ, have performed the following reviews on Brain Dread prior to the start of the procedure:            * Patient was identified by name and date of birth * Agreement on procedure being performed was verified  * Risks and Benefits explained to the patient  * Procedure site verified and marked as necessary  * Patient was positioned for comfort  * Consent was signed and verified     Time: 3:53 PM    Date of procedure: 8/19/2020    Procedure performed by:  Wayne Johansen MD    Provider accompanied by: Lissa. Patient accompanied by: Self.     How tolerated by patient: tolerated the procedure well with no complications    Post Procedural Pain Scale: 0 - No Hurt    Comments: none    Written by Onelia Pereira, [address] as dictated by Mo Sage MD

## 2020-08-19 NOTE — LETTER
8/19/20 Patient: Charles Holm YOB: 1980 Date of Visit: 8/19/2020 Beena Graf MD 
[address] [place] [address] Drive [address] VIA Facsimile: [phone] Sarina Burnett, [address] [place] [address] [address] [id number] [place] [address] VIA In Basket Dear MD Sarina Morales MD, Thank you for referring Mr. Charles Holm to [address] [place] [address] for evaluation. My notes for this consultation are attached. If you have questions, please do not hesitate to call me. I look forward to following your patient along with you.  
 
 
Sincerely, 
 
Yuri Sim MD

## 2020-08-20 NOTE — PROGRESS NOTES
[facility]  [address], [place]   Phone: [phone]  Fax: [phone]        PROGRESS NOTE      HISTORY OF PRESENT ILLNESS:  The patient is a 38 y.o. male and was seen today for follow up of low back pain radiating into the RLE in an L5-S1 distribution to the knee. Initially had c/o low back pain radiating into the BLE in and L4 distribution to the knees since work injury in 2007, worse x 3 weeks. Prior to the acute exacerbation, he reports his chronic baseline level of pain 4/10. Pt reports immediate onset of pain following injury. Originally through Workers comp but they have since settled. Pt treats with Toradol, with relief. Pt denies h/o spinal surgery. Pt reports he had spinal injections x 2017+. Pt underwent right SI joint injection on 8/22/19 with no relief. Pt went through physical therapy x 1 year ago with no relief. He previously did not tolerate NEURONTIN secondary to sedation. He did not tolerate Cymbalta secondary to hallucinations. Patient reports an episode of bladder incontinence x 7/2019. He later clarified he could not make it to the bathroom in time. Pt denies fever, weight loss, or skin changes. The patient is RHD. Note from Katlin Contreras NP dated 12/20/08 indicating patient was seen with c/o lower back pain. Indicated he had improvement with PT. Note from Dr. Rolan Maradiaga 5/18/16 indicating patient was seen with c/o back and leg pain. Indicated he was seen for a second opinion for his back and leg pain. Previously followed [facility]. Original work injury 7/27/08. Indicated he had blocks x2 in 2013, x1 in 2014, and x1 in 2015. Indicated he had relief with those injections. Note from Dr. Rolan Maradiaga 8/16/16 indicating patient was seen with c/o low back and leg pain. Indicated his MRI showed evidence of left sided lumbar radiculopathy. Pt underwent left sided SNRB on 4/9/15 with Dr. Juma Duff.  Pt underwent right sided Isa Mcconnell. Pt underwent left sided L4 SNRB on 12/12/13 with Dr. Emanuel Mcconnell. Pt underwent left sided L4 SNRB on 5/16/13 with Dr. Luis Antonio Hoover note from Dr. Mila Colbert dated 5/15/20 indicating patient presented for back pain radiating into the BLE (L>R), which started 1 day prior. Indicated he previously was on Neurontin, but noted he ran out x 1 year ago. He previously treated with oxycodone and acetaminophen without relief. Restarted his Neurontin at 300 mg qhs, provided him a Rx for prednisone, and treated him with a Toradol injection. Preliminary reading of lumbar plain films: Mild disc space narrowing at L4 on L5 and L5 on S1. Small anterior osteophytes on L4- L5. No acute pathology identified. Lumbar spine MRI dated 7/10/19 reviewed. Per report, degenerative changes in the 3 lower most lumbar levels as describes with no spinal stenosis. At his last clinical appointment, I referred him to neurology for a RLE EMG. He previously tolerated Neurontin with some relief, I restarted his Neurontin at 300 mg TID.     The patient returns today with low back and right groin pain. He rates his pain 3/10, previously 5/10. Additionally, he endorses paraesthesias in the inferior aspect of his bilateral feet. His groin pain is exacerbated by activities such as putting on shoes and socks. He is tolerating the Neurontin 300 mg TID with benefit. He is noncompliant with his HEP. Pt denies change in bowel or bladder habits. RLE EMG dated 8/19/2020 by Dr. Veronica Gao was normal.      reviewed. There is no height or weight on file to calculate BMI.     PCP: Abrahan Patton MD      Past Medical History:   Diagnosis Date    Arthritis         Social History     Socioeconomic History    Marital status:      Spouse name: Not on file    Number of children: Not on file    Years of education: Not on file    Highest education level: Not on file   Occupational History    Not on file   Social Needs    Financial resource strain: Not on file    Food insecurity     Worry: Not on file     Inability: Not on file    Transportation needs     Medical: Not on file     Non-medical: Not on file   Tobacco Use    Smoking status: Former Smoker    Smokeless tobacco: Never Used   Substance and Sexual Activity    Alcohol use: Not Currently    Drug use: Not Currently    Sexual activity: Not on file   Lifestyle    Physical activity     Days per week: Not on file     Minutes per session: Not on file    Stress: Not on file   Relationships    Social connections     Talks on phone: Not on file     Gets together: Not on file     Attends Shinto service: Not on file     Active member of club or organization: Not on file     Attends meetings of clubs or organizations: Not on file     Relationship status: Not on file    Intimate partner violence     Fear of current or ex partner: Not on file     Emotionally abused: Not on file     Physically abused: Not on file     Forced sexual activity: Not on file   Other Topics Concern     Service Not Asked    Blood Transfusions Not Asked    Caffeine Concern Not Asked    Occupational Exposure Not Asked   Jamil Huddle Hazards Not Asked    Sleep Concern Not Asked    Stress Concern Not Asked    Weight Concern Not Asked    Special Diet Not Asked    Back Care Not Asked    Exercise Not Asked    Bike Helmet Not Asked    Seat Belt Not Asked    Self-Exams Not Asked   Social History Narrative    Not on file       Current Outpatient Medications   Medication Sig Dispense Refill    gabapentin (NEURONTIN) 300 mg capsule TAKE 1 CAPSULE BY MOUTH AT BEDTIME FOR 10 DAYS      gabapentin (NEURONTIN) 300 mg capsule Take 1 Cap by mouth three (3) times daily. Max Daily Amount: 900 mg. 90 Cap 1    predniSONE (DELTASONE) 20 mg tablet TAKE 2 TABLETS BY MOUTH ONCE DAILY FOR 5 DAYS      ketorolac (TORADOL) 10 mg tablet Take  by mouth.          Allergies   Allergen Reactions    Banana Hives PHYSICAL EXAMINATION    Visit Vitals  /88 (BP 1 Location: Left arm, BP Patient Position: Sitting)   Pulse 66   Temp 98.3 °F (36.8 °C) (Temporal)   SpO2 99%       CONSTITUTIONAL: NAD, A&O x 3  SENSATION: Intact to light touch throughout  RANGE OF MOTION: The patient has full passive range of motion in all four extremities. No increase in pain with internal rotation of the right hip. MOTOR:  Straight Leg Raise: Negative, bilateral    Ambulates with a single point cane. Hip Flex Knee Ext Knee Flex Ankle DF GTE Ankle PF Tone   Right +4/5 +4/5 +4/5 +4/5 +4/5 +4/5 +4/5   Left +4/5 +4/5 +4/5 +4/5 +4/5 +4/5 +4/5       ASSESSMENT   Diagnoses and all orders for this visit:    1. Low back pain at multiple sites  -     gabapentin (NEURONTIN) 300 mg capsule; TAKE 1 CAPSULE BY MOUTH three times a day with food  Indications: neuropathic pain    2. Lumbosacral spondylosis without myelopathy  -     gabapentin (NEURONTIN) 300 mg capsule; TAKE 1 CAPSULE BY MOUTH three times a day with food  Indications: neuropathic pain    3. Lumbar neuritis  -     gabapentin (NEURONTIN) 300 mg capsule; TAKE 1 CAPSULE BY MOUTH three times a day with food  Indications: neuropathic pain    4. DDD (degenerative disc disease), lumbar  -     gabapentin (NEURONTIN) 300 mg capsule; TAKE 1 CAPSULE BY MOUTH three times a day with food  Indications: neuropathic pain      IMPRESSION AND PLAN:  Patient returns to the office today with c/o low back and right groin pain. Multiple treatment options were discussed. Some of his pain complaints could possibly be related to hip pathology although there is nothing obvious on his exam. I offered to increase his Neurontin, pt declined. I provided him refills of Neurontin 300 mg TID. I recommended he increase the frequency of HEP to daily. Patient is neurologically intact. I will see the patient back in 3 month's time or earlier if needed.       Written by Gabrielle Graham as dictated by Micheal Espinoza MD  I examined the patient, reviewed and agree with the note.

## 2020-08-21 ENCOUNTER — OFFICE VISIT (OUTPATIENT)
Dept: ORTHOPEDIC SURGERY | Age: 40
End: 2020-08-21

## 2020-08-21 VITALS
HEART RATE: 66 BPM | TEMPERATURE: 98.3 F | SYSTOLIC BLOOD PRESSURE: 127 MMHG | OXYGEN SATURATION: 99 % | DIASTOLIC BLOOD PRESSURE: 88 MMHG

## 2020-08-21 DIAGNOSIS — M47.817 LUMBOSACRAL SPONDYLOSIS WITHOUT MYELOPATHY: ICD-10-CM

## 2020-08-21 DIAGNOSIS — M54.16 LUMBAR NEURITIS: ICD-10-CM

## 2020-08-21 DIAGNOSIS — M51.36 DDD (DEGENERATIVE DISC DISEASE), LUMBAR: ICD-10-CM

## 2020-08-21 DIAGNOSIS — M54.50 LOW BACK PAIN AT MULTIPLE SITES: Primary | ICD-10-CM

## 2020-08-21 RX ORDER — GABAPENTIN 300 MG/1
CAPSULE ORAL
Qty: 270 CAP | Refills: 0 | Status: SHIPPED | OUTPATIENT
Start: 2020-08-21 | End: 2020-10-15 | Stop reason: SDUPTHER

## 2020-08-21 NOTE — LETTER
8/21/20 Patient: Gwendolyn Peguero YOB: 1980 Date of Visit: 8/21/2020 Hudson Lindsay MD 
[address] [place], [place] [id number] VIA Facsimile: [phone] Dear Hudson Lindsay MD, Thank you for referring Mr. Gwendolyn Peguero to [address] for evaluation. My notes for this consultation are attached. If you have questions, please do not hesitate to call me. I look forward to following your patient along with you. Sincerely, Kim Barcenas MD

## 2020-10-09 ENCOUNTER — TELEPHONE (OUTPATIENT)
Dept: ORTHOPEDIC SURGERY | Age: 40
End: 2020-10-09

## 2020-10-09 NOTE — TELEPHONE ENCOUNTER
Patient is requesting to move 11/23 appt to sooner date as his PCP is recommending an increase of rx Gabapentin. Patient is requesting provider only, please review to determine if pt can be seen 10/14, he can be reached at [phone].

## 2020-10-13 NOTE — PROGRESS NOTES
[facility]  [address] [facility]  [place] Zambrano, [address]  Phone: [phone]  Fax: [phone]        PROGRESS NOTE      HISTORY OF PRESENT ILLNESS:  The patient is a 38 y.o. male and was seen today for follow up of low back and right groin pain radiating anteromedially into the RLE to the knee. Previously, he was seen for low back pain radiating into the RLE in an L5-S1 distribution to the knee. Additionally, he endorses paraesthesias in the inferior aspect of his bilateral feet. Initially had c/o low back pain radiating into the BLE in and L4 distribution to the knees since work injury in 2007, worse x 3 weeks. Prior to the acute exacerbation, he reports his chronic baseline level of pain 4/10. Pt reports immediate onset of pain following injury. Originally through Workers comp but they have since settled. Pt treats with Toradol, with relief. Pt denies h/o spinal surgery. Pt reports he had spinal injections x 2017+. Pt underwent right SI joint injection on 8/22/19 with no relief. Pt went through physical therapy x 1 year ago with no relief. He is non compliant with his HEP. He previously did not tolerate NEURONTIN secondary to sedation. He did not tolerate Cymbalta secondary to hallucinations. Patient reports an episode of bladder incontinence x 7/2019. He later clarified he could not make it to the bathroom in time. Pt denies fever, weight loss, or skin changes. The patient is RHD. Note from Katlin Contreras NP dated 12/20/08 indicating patient was seen with c/o lower back pain. Indicated he had improvement with PT. Note from Dr. Jania Cosme 5/18/16 indicating patient was seen with c/o back and leg pain. Indicated he was seen for a second opinion for his back and leg pain. Previously followed [facility]. Original work injury 7/27/08. Indicated he had blocks x2 in 2013, x1 in 2014, and x1 in 2015. Indicated he had relief with those injections. Note from  Apolinar dated 8/16/16 indicating patient was seen with c/o low back and leg pain. Indicated his MRI showed evidence of left sided lumbar radiculopathy. Pt underwent left sided SNRB on 4/9/15 with Dr. Mainor Frederick. Pt underwent right sided SNRB on 8/5/14 with Dr. Mainor Frederick. Pt underwent left sided L4 SNRB on 12/12/13 with Dr. Mainor Frederick. Pt underwent left sided L4 SNRB on 5/16/13 with Dr. Henry Player note from Dr. Josh Wright patient presented for back pain radiating into the BLE (L>R), which started 1 day prior. Indicated he previously was on Neurontin, but noted he ran out x 1 year ago. He previously treated with oxycodone and acetaminophen without relief. Restarted his Neurontin at 300 mg qhs, provided him a Rx for prednisone, and treated him with a Toradol injection. Preliminary reading of lumbar plain films: Mild disc space narrowing at L4 on L5 and L5 on S1. Small anterior osteophytes on L4- L5. No acute pathology identified. Lumbar spine MRI dated 7/10/19 reviewed. Per report, degenerative changes in the 3 lower most lumbar levels as describes with no spinal stenosis. RLE EMG dated 8/19/2020 by Dr. Pooja Dee was normal. At his last clinical appointment, some of his pain complaints could possibly be related to hip pathology although there was nothing obvious on his exam. I offered to increase his Neurontin, pt declined. I provided him refills of Neurontin 300 mg TID. I recommended he increase the frequency of HEP to daily.      The patient returns today and reports pain location and distribution remains unchanged. He rates his pain 3/10, unchanged. His groin pain is increased by activities such as putting on socks and shoes and getting into and out of a car. Pt had an increase in right groin pain radiating anteromedially into the RLE to the knee. His PCP increased his Neurontin to 300 mg BID and 600 mg qhs on 10/8/2020. Pt denies change in bowel or bladder habits.  reviewed.  Body mass index is 27.21 kg/m².     PCP: Curt Lester MD      Past Medical History:   Diagnosis Date    Arthritis         Social History     Socioeconomic History    Marital status:      Spouse name: Not on file    Number of children: Not on file    Years of education: Not on file    Highest education level: Not on file   Occupational History    Not on file   Social Needs    Financial resource strain: Not on file    Food insecurity     Worry: Not on file     Inability: Not on file    Transportation needs     Medical: Not on file     Non-medical: Not on file   Tobacco Use    Smoking status: Former Smoker    Smokeless tobacco: Never Used   Substance and Sexual Activity    Alcohol use: Not Currently    Drug use: Not Currently    Sexual activity: Not on file   Lifestyle    Physical activity     Days per week: Not on file     Minutes per session: Not on file    Stress: Not on file   Relationships    Social connections     Talks on phone: Not on file     Gets together: Not on file     Attends Taoism service: Not on file     Active member of club or organization: Not on file     Attends meetings of clubs or organizations: Not on file     Relationship status: Not on file    Intimate partner violence     Fear of current or ex partner: Not on file     Emotionally abused: Not on file     Physically abused: Not on file     Forced sexual activity: Not on file   Other Topics Concern     Service Not Asked    Blood Transfusions Not Asked    Caffeine Concern Not Asked    Occupational Exposure Not Asked   Law  Hazards Not Asked    Sleep Concern Not Asked    Stress Concern Not Asked    Weight Concern Not Asked    Special Diet Not Asked    Back Care Not Asked    Exercise Not Asked    Bike Helmet Not Asked    Seat Belt Not Asked    Self-Exams Not Asked   Social History Narrative    Not on file       Current Outpatient Medications   Medication Sig Dispense Refill    gabapentin (NEURONTIN) 300 mg capsule TAKE 1 CAPSULE BY MOUTH three times a day with food  Indications: neuropathic pain 270 Cap 0    gabapentin (NEURONTIN) 300 mg capsule Take 1 Cap by mouth three (3) times daily. Max Daily Amount: 900 mg. 90 Cap 1    predniSONE (DELTASONE) 20 mg tablet TAKE 2 TABLETS BY MOUTH ONCE DAILY FOR 5 DAYS      ketorolac (TORADOL) 10 mg tablet Take  by mouth. Allergies   Allergen Reactions    Banana Hives          PHYSICAL EXAMINATION    Visit Vitals  /75 (BP 1 Location: Left arm, BP Patient Position: Sitting)   Pulse 78   Temp 97.8 °F (36.6 °C) (Temporal)   Wt 200 lb 9.6 oz (91 kg)   SpO2 98%   BMI 27.21 kg/m²       CONSTITUTIONAL: NAD, A&O x 3  SENSATION: Intact to light touch throughout  RANGE OF MOTION: The patient has full passive range of motion in all four extremities. No increase in groin pain with internal rotation of the right hip. MOTOR:  Straight Leg Raise: Negative, bilateral    Ambulates with a single point cane. Hip Flex Knee Ext Knee Flex Ankle DF GTE Ankle PF Tone   Right +4/5 +4/5 +4/5 +4/5 +4/5 +4/5 +4/5   Left +4/5 +4/5 +4/5 +4/5 +4/5 +4/5 +4/5        RADIOGRAPHS  Preliminary reading of pelvic plain films dated 10/14/2020 revealed:  No acute pathology identified. Degenerative changes on the right relative to the left hip joint. These are being sent out for official reading by Dr. Rosey Velasquez. ASSESSMENT   Diagnoses and all orders for this visit:    1. Hip pain  -     POC XRAY, PELVIS; 1 OR 2 VIEWS  -     REFERRAL TO ORTHOPEDICS    2. Low back pain at multiple sites  -     REFERRAL TO ORTHOPEDICS    3. Lumbosacral spondylosis without myelopathy  -     REFERRAL TO ORTHOPEDICS    4. Lumbar neuritis  -     REFERRAL TO ORTHOPEDICS    5. DDD (degenerative disc disease), lumbar  -     REFERRAL TO ORTHOPEDICS    6.  Osteoarthritis of right hip, unspecified osteoarthritis type  -     REFERRAL TO ORTHOPEDICS        IMPRESSION AND PLAN:  Patient returns to the office today with c/o low back and right groin pain radiating anteromedially into the RLE to the knee. Multiple treatment options were discussed. Clinically I think his symptoms may be related to hip pathology. I will refer him to orthopedics for evaluation of his right hip. In the interim, he should continue on the Neurontin. He did not require a refill of the medication. Patient is neurologically intact. I will see the patient back in 1 month's time or earlier if needed. Written by Edu Warren, as dictated by Urszula Fatima MD  I examined the patient, reviewed and agree with the note.

## 2020-10-14 ENCOUNTER — OFFICE VISIT (OUTPATIENT)
Dept: ORTHOPEDIC SURGERY | Age: 40
End: 2020-10-14
Payer: COMMERCIAL

## 2020-10-14 VITALS
TEMPERATURE: 97.8 F | DIASTOLIC BLOOD PRESSURE: 75 MMHG | HEART RATE: 78 BPM | OXYGEN SATURATION: 98 % | WEIGHT: 200.6 LBS | BODY MASS INDEX: 27.21 KG/M2 | SYSTOLIC BLOOD PRESSURE: 109 MMHG

## 2020-10-14 DIAGNOSIS — M16.11 OSTEOARTHRITIS OF RIGHT HIP, UNSPECIFIED OSTEOARTHRITIS TYPE: ICD-10-CM

## 2020-10-14 DIAGNOSIS — M51.36 DDD (DEGENERATIVE DISC DISEASE), LUMBAR: ICD-10-CM

## 2020-10-14 DIAGNOSIS — M25.559 HIP PAIN: Primary | ICD-10-CM

## 2020-10-14 DIAGNOSIS — M47.817 LUMBOSACRAL SPONDYLOSIS WITHOUT MYELOPATHY: ICD-10-CM

## 2020-10-14 DIAGNOSIS — M54.16 LUMBAR NEURITIS: ICD-10-CM

## 2020-10-14 DIAGNOSIS — M54.50 LOW BACK PAIN AT MULTIPLE SITES: ICD-10-CM

## 2020-10-14 PROCEDURE — 72170 X-RAY EXAM OF PELVIS: CPT | Performed by: PHYSICAL MEDICINE & REHABILITATION

## 2020-10-14 PROCEDURE — 99213 OFFICE O/P EST LOW 20 MIN: CPT | Performed by: PHYSICAL MEDICINE & REHABILITATION

## 2020-10-14 NOTE — LETTER
10/14/20 Patient: Stephen Mayo YOB: 1980 Date of Visit: 10/14/2020 Ingris Suero MD 
[address] [place] [address] VIA Facsimile: [phone] Dear Ingris Suero MD, Thank you for referring Mr. Stephen Mayo to [address] for evaluation. My notes for this consultation are attached. If you have questions, please do not hesitate to call me. I look forward to following your patient along with you. Sincerely, Reji Morales MD

## 2020-10-15 ENCOUNTER — OFFICE VISIT (OUTPATIENT)
Dept: ORTHOPEDIC SURGERY | Age: 40
End: 2020-10-15
Payer: COMMERCIAL

## 2020-10-15 VITALS
HEART RATE: 95 BPM | SYSTOLIC BLOOD PRESSURE: 120 MMHG | WEIGHT: 199.8 LBS | TEMPERATURE: 97.7 F | OXYGEN SATURATION: 97 % | DIASTOLIC BLOOD PRESSURE: 77 MMHG | HEIGHT: 72 IN | BODY MASS INDEX: 27.06 KG/M2

## 2020-10-15 DIAGNOSIS — M54.50 LUMBAR PAIN: ICD-10-CM

## 2020-10-15 DIAGNOSIS — M54.10 RADICULAR LOW BACK PAIN: ICD-10-CM

## 2020-10-15 DIAGNOSIS — M25.551 RIGHT HIP PAIN: Primary | ICD-10-CM

## 2020-10-15 PROCEDURE — 99214 OFFICE O/P EST MOD 30 MIN: CPT | Performed by: ORTHOPAEDIC SURGERY

## 2020-10-15 NOTE — PROGRESS NOTES
Patient: Angel Lopez                MRN: [id number]       SSN: [id number]  YOB: 1980        AGE: 38 y.o. SEX: male  Body mass index is 27.1 kg/m². PCP: Elsa Nieves MD  10/15/20  HISTORY:  I had the pleasure of reviewing Maralyn Mask today for evaluation of right hip pain. He is a pleasant enough gentleman, who has a history of steroid usage, with right groin pain, also low back pain. Dr. Laura Myers has been seeing him. He had a work accident back in 2007 or 2008 and he has been dealing with problems. He tends to use his cane for longer distances. Some difficulty putting shoes and socks on, getting in and out of the car can be difficult. Night pain is also sometimes a feature. Not a lot in the way of mechanical symptoms. PHYSICAL EXAMINATION:  He fidgets when we are speaking because his back is sore and he has to readjust his position. He actually prefers to stand than sit for a prolonged period of time. Left hip rotates normally. Right hip is just a touch stiff with internal rotation. Flexion, adduction, internal rotation is mildly tender as well. Just slight tenderness over the trochanter. He has ischial notch tenderness as well. The ischial bursa is not particularly tender and the low back is midly tender as well today. Just slight decrease in sensation L5 on the right side, IT band/EHL 5/5 and SLR just equivocal.    RADIOGRAPHS:  [facility] show some mild, perhaps moderate arthritis. He has coxa vera as well. I am concerned with his previous steroid usage he may have a degree of AVN. There is some shadowing in the femoral head. The x-ray is suboptimal.    PLAN:  I recommend MRI of the right hip. Additionally, he is having some ongoing radiculopathy and he has not had an MRI of his back for about 4-5 years. Therefore, MRI of the lumbar spine is indicated as well. We will get these MRIs and I will see him back afterwards.   It has been a pleasure to share in his care. Rachid Rodrigez MD          REVIEW OF SYSTEMS:      CON: negative  EYE: negative   ENT: negative  RESP: negative  GI:    negative   :  negative  MSK: Positive  A twelve point review of systems was completed, positives noted and all other systems were reviewed and are negative          Past Medical History:   Diagnosis Date    Arthritis        No family history on file. Current Outpatient Medications   Medication Sig Dispense Refill    gabapentin (NEURONTIN) 300 mg capsule Take 1 Cap by mouth three (3) times daily. Max Daily Amount: 900 mg. 90 Cap 1    predniSONE (DELTASONE) 20 mg tablet TAKE 2 TABLETS BY MOUTH ONCE DAILY FOR 5 DAYS      ketorolac (TORADOL) 10 mg tablet Take  by mouth.          Allergies   Allergen Reactions    Banana Hives       Past Surgical History:   Procedure Laterality Date    HX VASECTOMY         Social History     Socioeconomic History    Marital status:      Spouse name: Not on file    Number of children: Not on file    Years of education: Not on file    Highest education level: Not on file   Occupational History    Not on file   Social Needs    Financial resource strain: Not on file    Food insecurity     Worry: Not on file     Inability: Not on file    Transportation needs     Medical: Not on file     Non-medical: Not on file   Tobacco Use    Smoking status: Former Smoker    Smokeless tobacco: Never Used   Substance and Sexual Activity    Alcohol use: Not Currently    Drug use: Not Currently    Sexual activity: Not on file   Lifestyle    Physical activity     Days per week: Not on file     Minutes per session: Not on file    Stress: Not on file   Relationships    Social connections     Talks on phone: Not on file     Gets together: Not on file     Attends Restorationist service: Not on file     Active member of club or organization: Not on file     Attends meetings of clubs or organizations: Not on file Relationship status: Not on file    Intimate partner violence     Fear of current or ex partner: Not on file     Emotionally abused: Not on file     Physically abused: Not on file     Forced sexual activity: Not on file   Other Topics Concern     Service Not Asked    Blood Transfusions Not Asked    Caffeine Concern Not Asked    Occupational Exposure Not Asked    Hobby Hazards Not Asked    Sleep Concern Not Asked    Stress Concern Not Asked    Weight Concern Not Asked    Special Diet Not Asked    Back Care Not Asked    Exercise Not Asked    Bike Helmet Not Asked   [address] Not Asked    Self-Exams Not Asked   Social History Narrative    Not on file       Visit Vitals  /77 (BP 1 Location: Right arm, BP Patient Position: Sitting)   Pulse 95   Temp 97.7 °F (36.5 °C) (Temporal)   Ht 6' (1.829 m)   Wt 90.6 kg (199 lb 12.8 oz)   SpO2 97%   BMI 27.10 kg/m²         PHYSICAL EXAMINATION:  GENERAL: Alert and oriented x3, in no acute distress, well-developed, well-nourished, afebrile. HEART: No JVD. EYES: No scleral icterus   NECK: No significant lymphadenopathy   LUNGS: No respiratory compromise or indrawing  ABDOMEN: Soft, non-tender, non-distended. Electronically signed by:  Verónica Boateng MD

## 2020-10-18 ENCOUNTER — HOSPITAL ENCOUNTER (EMERGENCY)
Age: 40
Discharge: HOME | End: 2020-10-18
Attending: INTERNAL MEDICINE
Payer: COMMERCIAL

## 2020-10-18 VITALS
BODY MASS INDEX: 27.09 KG/M2 | OXYGEN SATURATION: 100 % | DIASTOLIC BLOOD PRESSURE: 67 MMHG | SYSTOLIC BLOOD PRESSURE: 124 MMHG | HEIGHT: 72 IN | WEIGHT: 200 LBS | RESPIRATION RATE: 18 BRPM | HEART RATE: 69 BPM | TEMPERATURE: 98.3 F

## 2020-10-18 DIAGNOSIS — R53.83 FATIGUE, UNSPECIFIED TYPE: Primary | ICD-10-CM

## 2020-10-18 LAB
ANION GAP SERPL CALC-SCNC: 6 MMOL/L
BASOPHILS # BLD: 0 K/UL (ref 0–0.1)
BASOPHILS NFR BLD: 0 % (ref 0–2)
BUN SERPL-MCNC: 9 MG/DL (ref 9–21)
BUN/CREAT SERPL: 10
CA-I BLD-MCNC: 8.6 MG/DL (ref 8.5–10.5)
CHLORIDE SERPL-SCNC: 104 MMOL/L (ref 94–111)
CO2 SERPL-SCNC: 26 MMOL/L (ref 21–33)
CREAT SERPL-MCNC: 0.9 MG/DL (ref 0.8–1.5)
EOSINOPHIL # BLD: 0.1 K/UL (ref 0–0.4)
EOSINOPHIL NFR BLD: 1 % (ref 0–5)
ERYTHROCYTE [DISTWIDTH] IN BLOOD BY AUTOMATED COUNT: 14.2 % (ref 11.6–14.5)
GLUCOSE BLD STRIP.AUTO-MCNC: 94 MG/DL (ref 70–110)
GLUCOSE SERPL-MCNC: 100 MG/DL (ref 70–110)
HCT VFR BLD AUTO: 43.6 % (ref 36–48)
HGB BLD-MCNC: 13.7 G/DL (ref 13–16)
IMM GRANULOCYTES # BLD AUTO: 0 K/UL
IMM GRANULOCYTES NFR BLD AUTO: 0 %
LYMPHOCYTES # BLD: 3.2 K/UL (ref 0.9–3.6)
LYMPHOCYTES NFR BLD: 40 % (ref 21–52)
MCH RBC QN AUTO: 27.4 PG (ref 24–34)
MCHC RBC AUTO-ENTMCNC: 31.4 G/DL (ref 31–37)
MCV RBC AUTO: 87.2 FL (ref 74–97)
MONOCYTES # BLD: 0.6 K/UL (ref 0.05–1.2)
MONOCYTES NFR BLD: 7 % (ref 3–10)
NEUTS SEG # BLD: 4.3 K/UL (ref 1.8–8)
NEUTS SEG NFR BLD: 52 % (ref 40–73)
PERFORMED BY, TECHID: NORMAL
PLATELET # BLD AUTO: 211 K/UL (ref 135–420)
PMV BLD AUTO: 9.5 FL (ref 9.2–11.8)
POTASSIUM SERPL-SCNC: 4.3 MMOL/L (ref 3.2–5.1)
RBC # BLD AUTO: 5 M/UL (ref 4.7–5.5)
SODIUM SERPL-SCNC: 136 MMOL/L (ref 135–145)
WBC # BLD AUTO: 8.2 K/UL (ref 4.6–13.2)

## 2020-10-18 PROCEDURE — 74011250636 HC RX REV CODE- 250/636: Performed by: INTERNAL MEDICINE

## 2020-10-18 PROCEDURE — 85025 COMPLETE CBC W/AUTO DIFF WBC: CPT

## 2020-10-18 PROCEDURE — 82962 GLUCOSE BLOOD TEST: CPT

## 2020-10-18 PROCEDURE — 99284 EMERGENCY DEPT VISIT MOD MDM: CPT

## 2020-10-18 PROCEDURE — 80048 BASIC METABOLIC PNL TOTAL CA: CPT

## 2020-10-18 RX ADMIN — SODIUM CHLORIDE 1000 ML: 9 INJECTION, SOLUTION INTRAVENOUS at 09:08

## 2020-10-18 NOTE — ED TRIAGE NOTES
'Every body in my family has diabetes and they all have . I have been feeling like this for 3 days and I have headache. I talked to my aunt and she asked me if I was feeling better, and I said no so she said I should come to the ER. I drank a bunch of water but it hasn't helped me. \"

## 2020-10-18 NOTE — ED PROVIDER NOTES
EMERGENCY DEPARTMENT HISTORY AND PHYSICAL EXAM      Date: 10/18/2020  Patient Name: Chang Umana    History of Presenting Illness     Chief Complaint   Patient presents with    Lethargy       History Provided By: Patient    HPI: Chang Umana, 38 y.o. male with a past medical history significant hypertension and arthritis presents to the ED with cc of general lethargy. Patient states that he has been feeling weak for \"several days now\" and has also noticed an increase in fluid intake as well as increase in urinary frequency. He stated that he was drinking so much because he believed that his lethargy may have been from dehydration. Also he notes that he did have one vomiting episode at 3 am. Informed physician that he does have a family history of DM, but has yet to be diagnosed with it himself. Denies any fever or chills. There are no other complaints, changes, or physical findings at this time. PCP: Nate Trejo MD    No current facility-administered medications on file prior to encounter. Current Outpatient Medications on File Prior to Encounter   Medication Sig Dispense Refill    gabapentin (NEURONTIN) 300 mg capsule Take 1 Cap by mouth three (3) times daily. Max Daily Amount: 900 mg. 90 Cap 1    predniSONE (DELTASONE) 20 mg tablet TAKE 2 TABLETS BY MOUTH ONCE DAILY FOR 5 DAYS      ketorolac (TORADOL) 10 mg tablet Take  by mouth.          Past History     Past Medical History:  Past Medical History:   Diagnosis Date    Arthritis     Hypertension        Past Surgical History:  Past Surgical History:   Procedure Laterality Date    HX VASECTOMY         Family History:  Family History   Problem Relation Age of Onset    Diabetes Mother     Heart Disease Mother     Diabetes Father     Heart Disease Father     Diabetes Sister     Diabetes Paternal Aunt        Social History:  Social History     Tobacco Use    Smoking status: Former Smoker    Smokeless tobacco: Never Used   Substance Use Topics    Alcohol use: Not Currently    Drug use: Not Currently       Allergies: Allergies   Allergen Reactions    Banana Hives         Review of Systems     Review of Systems   Constitutional: Negative for chills, diaphoresis and fever. HENT: Negative for congestion, ear pain, rhinorrhea, sore throat and trouble swallowing. Eyes: Negative for photophobia, pain, redness and visual disturbance. Respiratory: Negative for cough, chest tightness, shortness of breath and wheezing. Cardiovascular: Negative for chest pain, palpitations and leg swelling. Gastrointestinal: Positive for vomiting. Negative for abdominal pain, blood in stool, diarrhea and nausea. Endocrine: Negative for polydipsia, polyphagia and polyuria. Genitourinary: Positive for frequency. Negative for dysuria and urgency. Musculoskeletal: Negative for back pain, joint swelling and neck pain. Skin: Negative for color change, pallor, rash and wound. Allergic/Immunologic: Negative for environmental allergies, food allergies and immunocompromised state. Neurological: Positive for weakness. Negative for seizures, syncope and headaches. Hematological: Negative for adenopathy. Does not bruise/bleed easily. Psychiatric/Behavioral: Negative for behavioral problems and confusion. The patient is not nervous/anxious. All other systems reviewed and are negative. Physical Exam     Physical Exam  Constitutional:       General: He is awake. He is not in acute distress. Appearance: Normal appearance. He is normal weight. He is not diaphoretic. HENT:      Head: Normocephalic and atraumatic. Right Ear: Tympanic membrane, ear canal and external ear normal.      Left Ear: Tympanic membrane, ear canal and external ear normal.      Nose: Nose normal.      Mouth/Throat:      Mouth: Mucous membranes are moist.      Pharynx: Oropharynx is clear. Eyes:      Extraocular Movements: Extraocular movements intact. Conjunctiva/sclera: Conjunctivae normal.      Pupils: Pupils are equal, round, and reactive to light. Neck:      Musculoskeletal: Normal range of motion and neck supple. No neck rigidity or muscular tenderness. Cardiovascular:      Rate and Rhythm: Normal rate and regular rhythm. Pulses: Normal pulses. Heart sounds: Normal heart sounds. Pulmonary:      Effort: Pulmonary effort is normal. No respiratory distress. Breath sounds: Normal breath sounds. Chest:      Chest wall: No tenderness. Abdominal:      General: Bowel sounds are normal.      Palpations: Abdomen is soft. There is no mass. Tenderness: There is no abdominal tenderness. Musculoskeletal: Normal range of motion. General: No swelling or tenderness. Skin:     General: Skin is warm. Coloration: Skin is not pale. Findings: No erythema. Neurological:      Mental Status: He is oriented to person, place, and time. He is lethargic. Sensory: No sensory deficit. Motor: No weakness. Psychiatric:         Mood and Affect: Mood normal.         Behavior: Behavior normal.         Thought Content: Thought content normal.         Judgment: Judgment normal.         Diagnostic Study Results     Labs -     Recent Results (from the past 12 hour(s))   GLUCOSE, POC    Collection Time: 10/18/20  8:40 AM   Result Value Ref Range    Glucose (POC) 94 70 - 110 mg/dL    Performed by Giacomo Henson    CBC WITH AUTOMATED DIFF    Collection Time: 10/18/20  8:45 AM   Result Value Ref Range    WBC 8.2 4.6 - 13.2 K/uL    RBC 5.00 4.70 - 5.50 M/uL    HGB 13.7 13.0 - 16.0 g/dL    HCT 43.6 36.0 - 48.0 %    MCV 87.2 74.0 - 97.0 FL    MCH 27.4 24.0 - 34.0 PG    MCHC 31.4 31.0 - 37.0 g/dL    RDW 14.2 11.6 - 14.5 %    PLATELET 736 618 - 859 K/uL    MPV 9.5 9.2 - 11.8 FL    NEUTROPHILS 52 40 - 73 %    LYMPHOCYTES 40 21 - 52 %    MONOCYTES 7 3 - 10 %    EOSINOPHILS 1 0 - 5 %    BASOPHILS 0 0 - 2 %    IMMATURE GRANULOCYTES 0 %    ABS. NEUTROPHILS 4.3 1.8 - 8.0 K/UL    ABS. LYMPHOCYTES 3.2 0.9 - 3.6 K/UL    ABS. MONOCYTES 0.6 0.05 - 1.2 K/UL    ABS. EOSINOPHILS 0.1 0.0 - 0.4 K/UL    ABS. BASOPHILS 0.0 0.0 - 0.1 K/UL    ABS. IMM. GRANS. 0.0 K/UL   METABOLIC PANEL, BASIC    Collection Time: 10/18/20  8:45 AM   Result Value Ref Range    Sodium 136 135 - 145 mmol/L    Potassium 4.3 3.2 - 5.1 mmol/L    Chloride 104 94 - 111 mmol/L    CO2 26 21 - 33 mmol/L    Anion gap 6 mmol/L    Glucose 100 70 - 110 mg/dL    BUN 9 9 - 21 mg/dL    Creatinine 0.90 0.8 - 1.50 mg/dL    BUN/Creatinine ratio 10      GFR est AA >60 ml/min/1.73m2    GFR est non-AA >60 ml/min/1.73m2    Calcium 8.6 8.5 - 10.5 mg/dL       Radiologic Studies -   @lastxrresult@  CT Results  (Last 48 hours)    None        CXR Results  (Last 48 hours)    None            Medical Decision Making   I am the first provider for this patient. I reviewed the vital signs, available nursing notes, past medical history, past surgical history, family history and social history. Vital Signs-Reviewed the patient's vital signs. Patient Vitals for the past 12 hrs:   Temp Pulse Resp BP SpO2   10/18/20 0955  73  127/63    10/18/20 0853  70 18 118/82    10/18/20 0838 98.3 °F (36.8 °C) 69 18 130/82 100 %       Records Reviewed: Nursing Notes          Provider Notes (Medical Decision Making): Fatigue; thinks that it's because he is diabetic and came to be checked; labs normal; vss; will refer to pcp    ED Course:   Initial assessment performed. The patients presenting problems have been discussed, and they are in agreement with the care plan formulated and outlined with them. I have encouraged them to ask questions as they arise throughout their visit. PLAN:  1. Current Discharge Medication List        2. Follow-up Information     Follow up With Specialties Details Why Contact Info    Nurse Finn  In 2 days          Return to ED if worse     Diagnosis     Clinical Impression:   1.  Fatigue, unspecified type         By signing my name below, Ahmet Blank, attest that this documentation has been prepared under the direction and in presence of Dr Manny Turner on 10/18/2020.   Electronically signed: Roderick Jackson, 10/18/2020 1467

## 2020-10-22 DIAGNOSIS — M54.10 RADICULAR LOW BACK PAIN: ICD-10-CM

## 2020-10-22 DIAGNOSIS — M54.50 LUMBAR PAIN: ICD-10-CM

## 2020-10-22 DIAGNOSIS — M25.551 RIGHT HIP PAIN: ICD-10-CM

## 2020-11-09 NOTE — PROGRESS NOTES
[facility]  [address], [place]   Phone: [phone]  Fax: [phone]        PROGRESS NOTE      HISTORY OF PRESENT ILLNESS:  The patient is a 38 y.o. male and was seen today for follow up of low back and right groin pain radiating anteromedially into the RLE to the knee. Previously, he was seen for low back pain radiating into the RLE in an L5-S1 distribution to the knee. Additionally, he endorses paraesthesias in the inferior aspect of his bilateral feet. Initially had c/o low back pain radiating into the BLE in and L4 distribution to the knees since work injury in 2007, worse x 3 weeks. Prior to the acute exacerbation, he reports his chronic baseline level of pain 4/10. Pt reports immediate onset of pain following injury. Originally through Workers comp but they have since settled. His groin pain is increased by activities such as putting on socks and shoes and getting into and out of a car. Pt had an increase in right groin pain radiating anteromedially into the RLE to the knee. Pt treats with Toradol, with relief. Pt denies h/o spinal surgery. Pt reports he had spinal injections x 2017+. Pt underwent right SI joint injection on 8/22/19 with no relief. Pt went through physical therapy x 1 year ago with no relief. He is non compliant with his HEP. He previously did not tolerate NEURONTIN secondary to sedation. He did not tolerate Cymbalta secondary to hallucinations. Patient reports an episode of bladder incontinence x 7/2019. He later clarified he could not make it to the bathroom in time. Pt denies fever, weight loss, or skin changes. The patient is RHD. Note from Katlin Contreras NP dated 12/20/08 indicating patient was seen with c/o lower back pain. Indicated he had improvement with PT. Note from Dr. Jake Whitman 5/18/16 indicating patient was seen with c/o back and leg pain. Indicated he was seen for a second opinion for his back and leg pain.  Previously followed by [facility]. Original work injury 7/27/08. Indicated he had blocks x2 in 2013, x1 in 2014, and x1 in 2015. Indicated he had relief with those injections. Note from Dr. Jania Cosme 8/16/16 indicating patient was seen with c/o low back and leg pain. Indicated his MRI showed evidence of left sided lumbar radiculopathy. Pt underwent left sided SNRB on 4/9/15 with Dr. Faith Greene. Pt underwent right sided SNRB on 8/5/14 with Dr. Faith Greene. Pt underwent left sided L4 SNRB on 12/12/13 with Dr. Faith Greene. Pt underwent left sided L4 SNRB on 5/16/13 with Dr. Deep Pedroza note from Dr. Catherine Merlos patient presented for back pain radiating into the BLE (L>R), which started 1 day prior. Indicated he previously was on Neurontin, but noted he ran out x 1 year ago. He previously treated with oxycodone and acetaminophen without relief. Restarted his Neurontin at 300 mg qhs, provided him a Rx for prednisone, and treated him with a Toradol injection. Preliminary reading of lumbar plain films: Mild disc space narrowing at L4 on L5 and L5 on S1. Small anterior osteophytes on L4- L5. No acute pathology identified. Lumbar spine MRI dated 7/10/19 reviewed. Per report, degenerative changes in the 3 lower most lumbar levels as describes with no spinal stenosis. RLE EMG dated 8/19/2020 by Dr. Jeffery Blood was normal. Preliminary reading of pelvic plain films dated 10/14/2020 revealed: No acute pathology identified. Degenerative changes on the right relative to the left hip joint. At his last clinical appointment, clinically I thought his symptoms may be related to hip pathology. I referred him to orthopedics for evaluation of his right hip. In the interim, he continued on the Neurontin. He did not require a refill of the medication.       The patient returns today and reports pain location and distribution remains unchanged. He rates his pain 5-6/10, previously 3/10.  He continues on Neurontin 300 mg BID and 600 mg qhs. Pt denies change in bowel or bladder habits. Note from Dr. Omar Bhakta dated 10/15/2020 indicating patient was seen with c/o right hip, low back and groin pain. Pt has h/o steroid use. Pt has pain putting on shoes and socks. Mild to moderate arthritis. Ordered an MRI of the hip.  reviewed. Body mass index is 27.72 kg/m².     PCP: Unknown, Provider      Past Medical History:   Diagnosis Date    Arthritis     Hypertension         Social History     Socioeconomic History    Marital status:      Spouse name: Not on file    Number of children: Not on file    Years of education: Not on file    Highest education level: Not on file   Occupational History    Not on file   Social Needs    Financial resource strain: Not on file    Food insecurity     Worry: Not on file     Inability: Not on file    Transportation needs     Medical: Not on file     Non-medical: Not on file   Tobacco Use    Smoking status: Former Smoker    Smokeless tobacco: Never Used   Substance and Sexual Activity    Alcohol use: Not Currently    Drug use: Not Currently    Sexual activity: Not on file   Lifestyle    Physical activity     Days per week: Not on file     Minutes per session: Not on file    Stress: Not on file   Relationships    Social connections     Talks on phone: Not on file     Gets together: Not on file     Attends Sikh service: Not on file     Active member of club or organization: Not on file     Attends meetings of clubs or organizations: Not on file     Relationship status: Not on file    Intimate partner violence     Fear of current or ex partner: Not on file     Emotionally abused: Not on file     Physically abused: Not on file     Forced sexual activity: Not on file   Other Topics Concern   [address] Service Not Asked    Blood Transfusions Not Asked    Caffeine Concern Not Asked    Occupational Exposure Not Asked   Eletha Mate Hazards Not Asked    Sleep Concern Not Asked    Stress Concern Not Asked    Weight Concern Not Asked    Special Diet Not Asked    Back Care Not Asked    Exercise Not Asked    Bike Helmet Not Asked   [address] Not Asked    Self-Exams Not Asked   Social History Narrative    Not on file       Current Outpatient Medications   Medication Sig Dispense Refill    MEN'S MULTI-VITAMIN PO Take  by mouth.  gabapentin (NEURONTIN) 300 mg capsule Take 1 Cap by mouth three (3) times daily. Max Daily Amount: 900 mg. 90 Cap 1    ketorolac (TORADOL) 10 mg tablet Take  by mouth. Allergies   Allergen Reactions    Banana Hives          PHYSICAL EXAMINATION    Visit Vitals  /75 (BP 1 Location: Right arm, BP Patient Position: Sitting)   Pulse 88   Temp 98 °F (36.7 °C)   Resp 14   Ht 6' (1.829 m)   Wt 204 lb 6.4 oz (92.7 kg)   SpO2 99%   BMI 27.72 kg/m²       CONSTITUTIONAL: NAD, A&O x 3  SENSATION: Intact to light touch throughout  RANGE OF MOTION: The patient has full passive range of motion in all four extremities. Slight increase in right groin pain with internal rotation of the right hip. MOTOR:  Straight Leg Raise: Negative, bilateral    Ambulates with a single point cane. Hip Flex Knee Ext Knee Flex Ankle DF GTE Ankle PF Tone   Right +4/5 +4/5 +4/5 +4/5 +4/5 +4/5 +4/5   Left +4/5 +4/5 +4/5 +4/5 +4/5 +4/5 +4/5       ASSESSMENT   Diagnoses and all orders for this visit:    1. Hip pain  -     MRI HIP  RT  WO CONT; Future    2. Low back pain at multiple sites    3. Lumbosacral spondylosis without myelopathy    4. Lumbar neuritis    5. DDD (degenerative disc disease), lumbar    6. Osteoarthritis of right hip, unspecified osteoarthritis type  -     MRI HIP  RT  WO CONT; Future    Other orders  -     pregabalin (Lyrica) 75 mg capsule; Take 1 Cap by mouth two (2) times a day.  Max Daily Amount: 150 mg.        IMPRESSION AND PLAN:  Patient returns to the office today with c/o low back and right groin pain radiating anteromedially into the RLE to the knee. Multiple treatment options were discussed. Dr. Bahman Costello indicated he has not had a L spine MRI in 4-5 years. He he ordered both a L spine and hip MRI. Both got denied. Pt's records indicate he had a MRI of his lumbar spine on 7/10/2019 at [facility] which noted degenerative changes at the lower 3 levels. I am unsure that explains his hip and groin pain. I reordered the right hip MRI. He should f/u with Dr. Bahman Costello following the MRI. I will wean him off of Neurontin 300 mg BID and 600 mg qhs secondary to minimal benefit and intolerance to a higher dose. I will try him on Lyrica 75 mg BID. The risks, benefits, and potential side effects of this medication were discussed. Patient understands and wishes to proceed. Patient advised to call the office if intolerant to new medication. Patient is neurologically intact. I will see the patient back in 1 month's time or earlier if needed. Written by Dannie Parker, as dictated by Lory Montgomery MD  I examined the patient, reviewed and agree with the note.

## 2020-11-11 ENCOUNTER — OFFICE VISIT (OUTPATIENT)
Dept: ORTHOPEDIC SURGERY | Age: 40
End: 2020-11-11
Payer: COMMERCIAL

## 2020-11-11 VITALS
SYSTOLIC BLOOD PRESSURE: 117 MMHG | TEMPERATURE: 98 F | DIASTOLIC BLOOD PRESSURE: 75 MMHG | OXYGEN SATURATION: 99 % | RESPIRATION RATE: 14 BRPM | WEIGHT: 204.4 LBS | HEIGHT: 72 IN | HEART RATE: 88 BPM | BODY MASS INDEX: 27.68 KG/M2

## 2020-11-11 DIAGNOSIS — M51.36 DDD (DEGENERATIVE DISC DISEASE), LUMBAR: ICD-10-CM

## 2020-11-11 DIAGNOSIS — M25.559 HIP PAIN: Primary | ICD-10-CM

## 2020-11-11 DIAGNOSIS — M54.16 LUMBAR NEURITIS: ICD-10-CM

## 2020-11-11 DIAGNOSIS — M54.50 LOW BACK PAIN AT MULTIPLE SITES: ICD-10-CM

## 2020-11-11 DIAGNOSIS — M16.11 OSTEOARTHRITIS OF RIGHT HIP, UNSPECIFIED OSTEOARTHRITIS TYPE: ICD-10-CM

## 2020-11-11 DIAGNOSIS — M47.817 LUMBOSACRAL SPONDYLOSIS WITHOUT MYELOPATHY: ICD-10-CM

## 2020-11-11 PROCEDURE — 99214 OFFICE O/P EST MOD 30 MIN: CPT | Performed by: PHYSICAL MEDICINE & REHABILITATION

## 2020-11-11 RX ORDER — PREGABALIN 75 MG/1
75 CAPSULE ORAL 2 TIMES DAILY
Qty: 60 CAP | Refills: 1 | Status: SHIPPED | OUTPATIENT
Start: 2020-11-11 | End: 2020-12-09 | Stop reason: SDUPTHER

## 2020-11-23 ENCOUNTER — HOSPITAL ENCOUNTER (OUTPATIENT)
Dept: MRI IMAGING | Age: 40
Discharge: HOME | End: 2020-11-23
Payer: COMMERCIAL

## 2020-11-23 DIAGNOSIS — M16.11 OSTEOARTHRITIS OF RIGHT HIP, UNSPECIFIED OSTEOARTHRITIS TYPE: ICD-10-CM

## 2020-11-23 DIAGNOSIS — M25.559 HIP PAIN: ICD-10-CM

## 2020-11-23 PROCEDURE — 73721 MRI JNT OF LWR EXTRE W/O DYE: CPT

## 2020-12-07 NOTE — PROGRESS NOTES
[facility]  [address], [place]   Phone: [phone]  Fax: [phone]        PROGRESS NOTE      HISTORY OF PRESENT ILLNESS:  The patient is a 38 y.o. male and was seen today for follow up of low back and right groin pain radiating anteromedially into the RLE to the knee. Previously, he was seen for low back pain radiating into the RLE in an L5-S1 distribution to the knee. Additionally, he endorses paraesthesias in the inferior aspect of his bilateral feet. Initially had c/o low back pain radiating into the BLE in and L4 distribution to the knees since work injury in 2007, worse x 3 weeks. Prior to the acute exacerbation, he reports his chronic baseline level of pain 4/10. Pt reports immediate onset of pain following injury. Originally through Workers comp but they have since settled. His groin pain is increased by activities such as putting on socks and shoes and getting into and out of a car. Pt had an increase in right groin pain radiating anteromedially into the RLE to the knee. Pt treats with Toradol, with relief. Pt denies h/o spinal surgery. Pt reports he had spinal injections x 2017+. Pt underwent right SI joint injection on 8/22/19 with no relief. Pt went through physical therapy x 1 year ago with no relief. He is non compliant with his HEP. He previously did not tolerate NEURONTIN secondary to sedation. He did not tolerate Cymbalta secondary to hallucinations. Patient reports an episode of bladder incontinence x 7/2019. He later clarified he could not make it to the bathroom in time. Pt denies fever, weight loss, or skin changes. The patient is RHD. Note from Katlin Contreras NP dated 12/20/08 indicating patient was seen with c/o lower back pain. Indicated he had improvement with PT. Note from Dr. Charo Singh 5/18/16 indicating patient was seen with c/o back and leg pain. Indicated he was seen for a second opinion for his back and leg pain.  Previously followed by [facility]. Original work injury 7/27/08. Indicated he had blocks x2 in 2013, x1 in 2014, and x1 in 2015. Indicated he had relief with those injections. Note from Dr. Sherine Monsalve 8/16/16 indicating patient was seen with c/o low back and leg pain. Indicated his MRI showed evidence of left sided lumbar radiculopathy. Pt underwent left sided SNRB on 4/9/15 with Dr. Yohana Swan. Pt underwent right sided SNRB on 8/5/14 with Dr. Yohana Swan. Pt underwent left sided L4 SNRB on 12/12/13 with Dr. Yohana Swan. Pt underwent left sided L4 SNRB on 5/16/13 with Dr. Richard Machadoous note from Dr. Ewelina Arenas patient presented for back pain radiating into the BLE (L>R), which started 1 day prior. Indicated he previously was on Neurontin, but noted he ran out x 1 year ago. He previously treated with oxycodone and acetaminophen without relief. Restarted his Neurontin at 300 mg qhs, provided him a Rx for prednisone, and treated him with a Toradol injection. Note from Dr. Claudene Bosworth dated 10/15/2020 indicating patient was seen with c/o right hip, low back and groin pain. Pt has h/o steroid use. Pt has pain putting on shoes and socks. Mild to moderate arthritis. Ordered an MRI of the hip. Preliminary reading of lumbar plain films: Mild disc space narrowing at L4 on L5 and L5 on S1. Small anterior osteophytes on L4- L5. No acute pathology identified. Lumbar spine MRI dated 7/10/19 reviewed. Per report, degenerative changes in the 3 lower most lumbar levels as describes with no spinal stenosis. RLE EMG dated 8/19/2020 by Dr. Makayla Case was normal. Preliminary reading of pelvic plain films dated 10/14/2020 revealed: No acute pathology identified. Degenerative changes on the right relative to the left hip joint. At his last clinical appointment, Dr. Claudene Bosworth indicated he had not had a L spine MRI in 4-5 years. He ordered both a L spine and hip MRI. Both got denied.  Pt's records indicate he had a MRI of his lumbar spine on 7/10/2019 at [facility] which noted degenerative changes at the lower 3 levels. I am unsure that explained his hip and groin pain. I reordered the right hip MRI. He should f/u with Dr. Bahman Costello following the MRI. I weaned him off of Neurontin 300 mg BID and 600 mg qhs secondary to minimal benefit and intolerance to a higher dose. I will try him on Lyrica 75 mg BID.     The patient returns today and reports pain location and distribution remains unchanged. He rates his pain 2/10, previously 5-6/10. He is tolerating the Lyrica 75 mg BID with benefit. He admits to recently changing his dose to 150 mg qhs. Pt is scheduled to f/u with Dr. Bahman Costello on 12/11/2020. Pt denies change in bowel or bladder habits. RT Hip MRI dated 11/23/2020 films not independently reviewed. Per report, no evidence of fracture, stress fracture, or avascular necrosis involving the right hip joint. Mild-moderate right hip joint chondrosis with small foci of subchondral marrow edema. No significant joint effusion or synovitis. Mild, likely degenerative fraying anterosuperior acetabular labrum.  reviewed. Body mass index is 28.07 kg/m².     PCP: Unknown, Provider      Past Medical History:   Diagnosis Date    Arthritis     Hypertension         Social History     Socioeconomic History    Marital status:      Spouse name: Not on file    Number of children: Not on file    Years of education: Not on file    Highest education level: Not on file   Occupational History    Not on file   Social Needs    Financial resource strain: Not on file    Food insecurity     Worry: Not on file     Inability: Not on file    Transportation needs     Medical: Not on file     Non-medical: Not on file   Tobacco Use    Smoking status: Former Smoker    Smokeless tobacco: Never Used   Substance and Sexual Activity    Alcohol use: Not Currently    Drug use: Not Currently    Sexual activity: Not on file   Lifestyle    Physical activity     Days per week: Not on file     Minutes per session: Not on file    Stress: Not on file   Relationships    Social connections     Talks on phone: Not on file     Gets together: Not on file     Attends Protestant service: Not on file     Active member of club or organization: Not on file     Attends meetings of clubs or organizations: Not on file     Relationship status: Not on file    Intimate partner violence     Fear of current or ex partner: Not on file     Emotionally abused: Not on file     Physically abused: Not on file     Forced sexual activity: Not on file   Other Topics Concern     Service Not Asked    Blood Transfusions Not Asked    Caffeine Concern Not Asked    Occupational Exposure Not Asked   Eletha Mate Hazards Not Asked    Sleep Concern Not Asked    Stress Concern Not Asked    Weight Concern Not Asked    Special Diet Not Asked    Back Care Not Asked    Exercise Not Asked    Bike Helmet Not Asked   [address] Not Asked    Self-Exams Not Asked   Social History Narrative    Not on file       Current Outpatient Medications   Medication Sig Dispense Refill    MEN'S MULTI-VITAMIN PO Take  by mouth.  pregabalin (Lyrica) 75 mg capsule Take 1 Cap by mouth two (2) times a day. Max Daily Amount: 150 mg. 60 Cap 1    gabapentin (NEURONTIN) 300 mg capsule Take 1 Cap by mouth three (3) times daily. Max Daily Amount: 900 mg. (Patient not taking: Reported on 12/9/2020) 90 Cap 1    ketorolac (TORADOL) 10 mg tablet Take  by mouth. Allergies   Allergen Reactions    Banana Hives          PHYSICAL EXAMINATION    Visit Vitals  /68 (BP 1 Location: Right arm, BP Patient Position: Sitting)   Pulse 80   Temp 98.7 °F (37.1 °C)   Resp 14   Ht 6' (1.829 m)   Wt 207 lb (93.9 kg)   SpO2 99%   BMI 28.07 kg/m²       CONSTITUTIONAL: NAD, A&O x 3  SENSATION: Decreased sensation to light touch on the right lateral thigh.  Otherwise, intact to light touch throughout  RANGE OF MOTION: The patient has full passive range of motion in all four extremities. MOTOR:  Straight Leg Raise: Negative, bilateral    Ambulates with a single point cane. Hip Flex Knee Ext Knee Flex Ankle DF GTE Ankle PF Tone   Right +4/5 +4/5 +4/5 +4/5 +4/5 +4/5 +4/5   Left +4/5 +4/5 +4/5 +4/5 +4/5 +4/5 +4/5       ASSESSMENT   Diagnoses and all orders for this visit:    1. Hip pain    2. Low back pain at multiple sites    3. Lumbosacral spondylosis without myelopathy    4. Lumbar neuritis    5. DDD (degenerative disc disease), lumbar    6. Osteoarthritis of right hip, unspecified osteoarthritis type      IMPRESSION AND PLAN:  Patient returns to the office today with c/o low back and right groin pain radiating anteromedially into the RLE to the knee. Multiple treatment options were discussed. I advised the pt to change his Lyrica from 150 mg qhs to 75 mg BID. I provided him a refill of Lyrica. Pt is scheduled to f/u with Dr. Francisca Tovar on 12/11/2020. Patient is neurologically intact. I will see the patient back in 1 month's time or earlier if needed. Written by Adolph Panchal, as dictated by Margarita Zarco MD  I examined the patient, reviewed and agree with the note.

## 2020-12-09 ENCOUNTER — OFFICE VISIT (OUTPATIENT)
Dept: ORTHOPEDIC SURGERY | Age: 40
End: 2020-12-09
Payer: COMMERCIAL

## 2020-12-09 VITALS
BODY MASS INDEX: 28.04 KG/M2 | HEIGHT: 72 IN | HEART RATE: 80 BPM | RESPIRATION RATE: 14 BRPM | TEMPERATURE: 98.7 F | OXYGEN SATURATION: 99 % | DIASTOLIC BLOOD PRESSURE: 68 MMHG | WEIGHT: 207 LBS | SYSTOLIC BLOOD PRESSURE: 122 MMHG

## 2020-12-09 DIAGNOSIS — M54.16 LUMBAR NEURITIS: ICD-10-CM

## 2020-12-09 DIAGNOSIS — M25.559 HIP PAIN: Primary | ICD-10-CM

## 2020-12-09 DIAGNOSIS — M54.50 LOW BACK PAIN AT MULTIPLE SITES: ICD-10-CM

## 2020-12-09 DIAGNOSIS — M47.817 LUMBOSACRAL SPONDYLOSIS WITHOUT MYELOPATHY: ICD-10-CM

## 2020-12-09 DIAGNOSIS — M16.11 OSTEOARTHRITIS OF RIGHT HIP, UNSPECIFIED OSTEOARTHRITIS TYPE: ICD-10-CM

## 2020-12-09 DIAGNOSIS — M51.36 DDD (DEGENERATIVE DISC DISEASE), LUMBAR: ICD-10-CM

## 2020-12-09 PROCEDURE — 99213 OFFICE O/P EST LOW 20 MIN: CPT | Performed by: PHYSICAL MEDICINE & REHABILITATION

## 2020-12-09 RX ORDER — PREGABALIN 75 MG/1
75 CAPSULE ORAL 2 TIMES DAILY
Qty: 60 CAP | Refills: 0 | Status: SHIPPED | OUTPATIENT
Start: 2020-12-09 | End: 2021-11-09 | Stop reason: ALTCHOICE

## 2021-01-08 NOTE — PROGRESS NOTES
[facility]  [address], [place]   Phone: [phone]  Fax: [phone]        PROGRESS NOTE      HISTORY OF PRESENT ILLNESS:  The patient is a 38 y.o. male and was seen today for follow up of low back and right groin pain radiating anteromedially into the RLE to the knee. Previously, he was seen for low back pain radiating into the RLE in an L5-S1 distribution to the knee. Additionally, he endorses paraesthesias in the inferior aspect of his bilateral feet. Initially had c/o low back pain radiating into the BLE in and L4 distribution to the knees since work injury in 2007, worse x 3 weeks. Prior to the acute exacerbation, he reports his chronic baseline level of pain 4/10. Pt reports immediate onset of pain following injury. Originally through Workers comp but they have since settled. His groin pain is increased by activities such as putting on socks and shoes and getting into and out of a car. Pt had an increase in right groin pain radiating anteromedially into the RLE to the knee. Pt treats with Toradol, with relief. Pt denies h/o spinal surgery. Pt reports he had spinal injections x 2017+. Pt underwent right SI joint injection on 8/22/19 with no relief. Pt went through physical therapy x 1 year ago with no relief. He is non compliant with his HEP. He previously did not tolerate NEURONTIN secondary to sedation. He did not tolerate Cymbalta secondary to hallucinations. Patient reports an episode of bladder incontinence x 7/2019. He later clarified he could not make it to the bathroom in time. Pt denies fever, weight loss, or skin changes. The patient is RHD. Note from Katlin Contreras NP dated 12/20/08 indicating patient was seen with c/o lower back pain. Indicated he had improvement with PT. Note from Dr. Vesna Hayes 5/18/16 indicating patient was seen with c/o back and leg pain. Indicated he was seen for a second opinion for his back and leg pain.  Previously [facility] by [facility] [place]. Original work injury 7/27/08. Indicated he had blocks x2 in 2013, x1 in 2014, and x1 in 2015. Indicated he had relief with those injections. Note from Dr. Deny Nguyen 8/16/16 indicating patient was seen with c/o low back and leg pain. Indicated his MRI showed evidence of left sided lumbar radiculopathy. Pt underwent left sided SNRB on 4/9/15 with Dr. Myah Tang. Pt underwent right sided SNRB on 8/5/14 with Dr. Myah Tang. Pt underwent left sided L4 SNRB on 12/12/13 with Dr. Myah Tang. Pt underwent left sided L4 SNRB on 5/16/13 with Dr. Whitney Real note from Dr. Manuel Mandel patient presented for back pain radiating into the BLE (L>R), which started 1 day prior. Indicated he previously was on Neurontin, but noted he ran out x 1 year ago. He previously treated with oxycodone and acetaminophen without relief. Restarted his Neurontin at 300 mg qhs, provided him a Rx for prednisone, and treated him with a Toradol injection. Note from Dr. Irineo Portillo 10/15/2020 indicating patient was seen with c/o right hip, low back and groin pain. Pt has h/o steroid use. Pt has pain putting on shoes and socks. Mild to moderate arthritis. Ordered an MRI of the hip. Preliminary reading of lumbar plain films: Mild disc space narrowing at L4 on L5 and L5 on S1. Small anterior osteophytes on L4- L5. No acute pathology identified. Lumbar spine MRI dated 7/10/19 reviewed. Per report, degenerative changes in the 3 lower most lumbar levels as describes with no spinal stenosis. RLE EMG dated 8/19/2020 by Dr. Mu Myers was normal. Preliminary reading of pelvic plain films dated 10/14/2020 revealed: No acute pathology identified. Degenerative changes on the right relative to the left hip joint. RT Hip MRI dated 11/23/2020 films not independently reviewed. Per report, no evidence of fracture, stress fracture, or avascular necrosis involving the right hip joint.  Mild-moderate right hip joint chondrosis with small foci of subchondral marrow edema. No significant joint effusion or synovitis. Mild, likely degenerative fraying anterosuperior acetabular labrum. At his last clinical appointment, I advised the pt to change his Lyrica from 150 mg qhs to 75 mg BID. I provided him a refill of Lyrica. Pt was scheduled to f/u with Dr. Heather Wylie on 12/11/2020.       The patient returns today and reports pain location and distribution remains unchanged. He rates his pain 1-8/10, previously 2/10. Overall, pt is having less episodes of higher level pain. He is tolerating the Lyrica 75 mg BID. Pt denies change in bowel or bladder habits. Pt has an appointment with Dr. Heather Wylie on 1/21/2021 to review his Right hip MRI films.  reviewed. Body mass index is 28.75 kg/m².     PCP: Babak Connell NP      Past Medical History:   Diagnosis Date    Arthritis     Hypertension         Social History     Socioeconomic History    Marital status:      Spouse name: Not on file    Number of children: Not on file    Years of education: Not on file    Highest education level: Not on file   Occupational History    Not on file   Social Needs    Financial resource strain: Not on file    Food insecurity     Worry: Not on file     Inability: Not on file    Transportation needs     Medical: Not on file     Non-medical: Not on file   Tobacco Use    Smoking status: Former Smoker    Smokeless tobacco: Never Used   Substance and Sexual Activity    Alcohol use: Not Currently    Drug use: Not Currently    Sexual activity: Not on file   Lifestyle    Physical activity     Days per week: Not on file     Minutes per session: Not on file    Stress: Not on file   Relationships    Social connections     Talks on phone: Not on file     Gets together: Not on file     Attends Pentecostalism service: Not on file     Active member of club or organization: Not on file     Attends meetings of clubs or organizations: Not on file Relationship status: Not on file    Intimate partner violence     Fear of current or ex partner: Not on file     Emotionally abused: Not on file     Physically abused: Not on file     Forced sexual activity: Not on file   Other Topics Concern     Service Not Asked    Blood Transfusions Not Asked    Caffeine Concern Not Asked    Occupational Exposure Not Asked    Hobby Hazards Not Asked    Sleep Concern Not Asked    Stress Concern Not Asked    Weight Concern Not Asked    Special Diet Not Asked    Back Care Not Asked    Exercise Not Asked    Bike Helmet Not Asked   [address] Not Asked    Self-Exams Not Asked   Social History Narrative    Not on file       Current Outpatient Medications   Medication Sig Dispense Refill    pregabalin (Lyrica) 75 mg capsule Take 1 Cap by mouth two (2) times a day. Max Daily Amount: 150 mg. 60 Cap 0    MEN'S MULTI-VITAMIN PO Take  by mouth.  gabapentin (NEURONTIN) 300 mg capsule Take 1 Cap by mouth three (3) times daily. Max Daily Amount: 900 mg. (Patient not taking: Reported on 12/9/2020) 90 Cap 1    ketorolac (TORADOL) 10 mg tablet Take  by mouth. Allergies   Allergen Reactions    Banana Hives          PHYSICAL EXAMINATION    Visit Vitals  /73 (BP 1 Location: Left arm)   Pulse 67   Temp 97.7 °F (36.5 °C)   Resp 19   Ht 6' (1.829 m)   Wt 212 lb (96.2 kg)   SpO2 100%   BMI 28.75 kg/m²       CONSTITUTIONAL: NAD, A&O x 3  SENSATION: Intact to light touch throughout  RANGE OF MOTION: The patient has full passive range of motion in all four extremities. Good ROM. Mild increase in pain with internal rotation of the right hip. MOTOR:  Straight Leg Raise: Negative, bilateral    Ambulates with a single point cane. Hip Flex Knee Ext Knee Flex Ankle DF GTE Ankle PF Tone   Right +4/5 +4/5 +4/5 +4/5 +4/5 +4/5 +4/5   Left +4/5 +4/5 +4/5 +4/5 +4/5 +4/5 +4/5       ASSESSMENT   Diagnoses and all orders for this visit:    1.  Hip pain  - pregabalin (Lyrica) 150 mg capsule; Take 1 Cap by mouth two (2) times a day. Max Daily Amount: 300 mg.    2. Low back pain at multiple sites  -     pregabalin (Lyrica) 150 mg capsule; Take 1 Cap by mouth two (2) times a day. Max Daily Amount: 300 mg.    3. Lumbosacral spondylosis without myelopathy  -     pregabalin (Lyrica) 150 mg capsule; Take 1 Cap by mouth two (2) times a day. Max Daily Amount: 300 mg.    4. Lumbar neuritis  -     pregabalin (Lyrica) 150 mg capsule; Take 1 Cap by mouth two (2) times a day. Max Daily Amount: 300 mg.    5. DDD (degenerative disc disease), lumbar  -     pregabalin (Lyrica) 150 mg capsule; Take 1 Cap by mouth two (2) times a day. Max Daily Amount: 300 mg.    6. Osteoarthritis of right hip, unspecified osteoarthritis type  -     pregabalin (Lyrica) 150 mg capsule; Take 1 Cap by mouth two (2) times a day. Max Daily Amount: 300 mg. IMPRESSION AND PLAN:  Patient returns to the office today with c/o low back and right groin pain radiating anteromedially into the RLE to the knee. Multiple treatment options were discussed. I will increase his Lyrica from 75 mg BID to 150 mg BID. Patient advised to call the office if intolerant to higher dose. Patient is neurologically intact. I will see the patient back in 1 month's time or earlier if needed. Written by Dipak Salmon, as dictated by Herminio Jimenez MD  I examined the patient, reviewed and agree with the note.

## 2021-01-11 ENCOUNTER — OFFICE VISIT (OUTPATIENT)
Dept: ORTHOPEDIC SURGERY | Age: 41
End: 2021-01-11
Payer: COMMERCIAL

## 2021-01-11 VITALS
SYSTOLIC BLOOD PRESSURE: 117 MMHG | BODY MASS INDEX: 28.71 KG/M2 | OXYGEN SATURATION: 100 % | TEMPERATURE: 97.7 F | HEIGHT: 72 IN | HEART RATE: 67 BPM | WEIGHT: 212 LBS | RESPIRATION RATE: 19 BRPM | DIASTOLIC BLOOD PRESSURE: 73 MMHG

## 2021-01-11 DIAGNOSIS — M25.559 HIP PAIN: Primary | ICD-10-CM

## 2021-01-11 DIAGNOSIS — M54.50 LOW BACK PAIN AT MULTIPLE SITES: ICD-10-CM

## 2021-01-11 DIAGNOSIS — M54.16 LUMBAR NEURITIS: ICD-10-CM

## 2021-01-11 DIAGNOSIS — M51.36 DDD (DEGENERATIVE DISC DISEASE), LUMBAR: ICD-10-CM

## 2021-01-11 DIAGNOSIS — M16.11 OSTEOARTHRITIS OF RIGHT HIP, UNSPECIFIED OSTEOARTHRITIS TYPE: ICD-10-CM

## 2021-01-11 DIAGNOSIS — M47.817 LUMBOSACRAL SPONDYLOSIS WITHOUT MYELOPATHY: ICD-10-CM

## 2021-01-11 PROCEDURE — 99214 OFFICE O/P EST MOD 30 MIN: CPT | Performed by: PHYSICAL MEDICINE & REHABILITATION

## 2021-01-11 RX ORDER — PREGABALIN 75 MG/1
75 CAPSULE ORAL 2 TIMES DAILY
Qty: 60 CAP | Refills: 1 | Status: CANCELLED | OUTPATIENT
Start: 2021-01-11

## 2021-01-11 RX ORDER — PREGABALIN 150 MG/1
150 CAPSULE ORAL 2 TIMES DAILY
Qty: 60 CAP | Refills: 1 | Status: SHIPPED | OUTPATIENT
Start: 2021-01-11 | End: 2021-11-09 | Stop reason: ALTCHOICE

## 2021-01-11 NOTE — LETTER
1/11/2021 Patient: Brady Christine. YOB: 1980 Date of Visit: 1/11/2021 Breann Trinidad NP 
[address] [place] [address] [address] Fax: [phone] Dear Breann Trinidad NP, Thank you for referring Mr. Alycia Agosto to [address] for evaluation. My notes for this consultation are attached. If you have questions, please do not hesitate to call me. I look forward to following your patient along with you. Sincerely, Ken Plascencia MD

## 2021-01-21 ENCOUNTER — OFFICE VISIT (OUTPATIENT)
Dept: ORTHOPEDIC SURGERY | Age: 41
End: 2021-01-21
Payer: COMMERCIAL

## 2021-01-21 VITALS
BODY MASS INDEX: 28.04 KG/M2 | SYSTOLIC BLOOD PRESSURE: 126 MMHG | HEART RATE: 93 BPM | RESPIRATION RATE: 16 BRPM | OXYGEN SATURATION: 98 % | TEMPERATURE: 97.5 F | WEIGHT: 207 LBS | DIASTOLIC BLOOD PRESSURE: 84 MMHG | HEIGHT: 72 IN

## 2021-01-21 DIAGNOSIS — M70.61 GREATER TROCHANTERIC BURSITIS OF RIGHT HIP: Primary | ICD-10-CM

## 2021-01-21 PROCEDURE — 99214 OFFICE O/P EST MOD 30 MIN: CPT | Performed by: ORTHOPAEDIC SURGERY

## 2021-01-21 PROCEDURE — 20610 DRAIN/INJ JOINT/BURSA W/O US: CPT | Performed by: ORTHOPAEDIC SURGERY

## 2021-01-21 RX ORDER — BETAMETHASONE SODIUM PHOSPHATE AND BETAMETHASONE ACETATE 3; 3 MG/ML; MG/ML
6 INJECTION, SUSPENSION INTRA-ARTICULAR; INTRALESIONAL; INTRAMUSCULAR; SOFT TISSUE ONCE
Status: COMPLETED | OUTPATIENT
Start: 2021-01-21 | End: 2021-01-21

## 2021-01-21 RX ADMIN — BETAMETHASONE SODIUM PHOSPHATE AND BETAMETHASONE ACETATE 6 MG: 3; 3 INJECTION, SUSPENSION INTRA-ARTICULAR; INTRALESIONAL; INTRAMUSCULAR; SOFT TISSUE at 15:21

## 2021-01-21 NOTE — PROGRESS NOTES
Patient: Joseph Cruz. MRN: [id number]       SSN: [id number]  YOB: 1980        AGE: 38 y.o. SEX: male  Body mass index is 28.07 kg/m². PCP: Gregorio Sandoval NP  01/21/21  HISTORY:  I had the pleasure of reviewing Kitty Solis today in reassessment of his right sided pain. He has trouble sitting still during the interview process, his back is obviously painful. He has had injections from Dr. Wolfgang Robles. He does complain of some mild groin discomfort, also hurts him to roll over on the right side. PHYSICAL EXAMINATION:  I watched him walk today. He certainly holds his back stiffly. Both hips actually rotate fairly well and are relatively non irritable with rotation, and flexion, internal rotation. He does have tenderness over the greater trochanter on the right side, mild to moderate, and also some mild  Hip flexor tendinitis. Back is fairly tender around 4-5. Calf non tender. SLR just equivocal.  There is some evidence of neuropathy distally with sharp testing. He looks well, well nourished, well developed, and obviously uncomfortable sitting for a long period of time. RADIOGRAPHS:  I did review his previous x-rays and also MRI of right hip, which revealed mild, perhaps moderate arthritis at worst, with some fraying of the labrum. PLAN:  Based on the physical exam today, I would not recommend any hip surgery for the time being. He may eventually require hip replacement, hopefully that is 8-10 or longer years away. I think we should treat the bursitis and the hip flexor tendinitis. I would recommend therapy and an injection for him. He would like to try with an injection for the bursitis. Under aseptic conditions the right hip was injected as per protocol and we will see him back as needed. It has been a pleasure to share in his care.     C:   Shelbi Finn NP          REVIEW OF SYSTEMS:      CON: negative  EYE: negative   ENT: negative  RESP: negative  GI:    negative   :  negative  MSK: Positive  A twelve point review of systems was completed, positives noted and all other systems were reviewed and are negative          Past Medical History:   Diagnosis Date    Arthritis     Hypertension        Family History   Problem Relation Age of Onset    Diabetes Mother     Heart Disease Mother     Diabetes Father     Heart Disease Father     Diabetes Sister     Diabetes Paternal Aunt        Current Outpatient Medications   Medication Sig Dispense Refill    pregabalin (Lyrica) 150 mg capsule Take 1 Cap by mouth two (2) times a day. Max Daily Amount: 300 mg. 60 Cap 1    pregabalin (Lyrica) 75 mg capsule Take 1 Cap by mouth two (2) times a day. Max Daily Amount: 150 mg. 60 Cap 0    MEN'S MULTI-VITAMIN PO Take  by mouth.  gabapentin (NEURONTIN) 300 mg capsule Take 1 Cap by mouth three (3) times daily. Max Daily Amount: 900 mg. (Patient not taking: Reported on 12/9/2020) 90 Cap 1    ketorolac (TORADOL) 10 mg tablet Take  by mouth.          Allergies   Allergen Reactions    Banana Hives       Past Surgical History:   Procedure Laterality Date    HX VASECTOMY         Social History     Socioeconomic History    Marital status:      Spouse name: Not on file    Number of children: Not on file    Years of education: Not on file    Highest education level: Not on file   Occupational History    Not on file   Social Needs    Financial resource strain: Not on file    Food insecurity     Worry: Not on file     Inability: Not on file    Transportation needs     Medical: Not on file     Non-medical: Not on file   Tobacco Use    Smoking status: Former Smoker    Smokeless tobacco: Never Used   Substance and Sexual Activity    Alcohol use: Not Currently    Drug use: Not Currently    Sexual activity: Not on file   Lifestyle    Physical activity     Days per week: Not on file     Minutes per session: Not on file    Stress: Not on file Relationships    Social connections     Talks on phone: Not on file     Gets together: Not on file     Attends [employer]an service: Not on file     Active member of club or organization: Not on file     Attends meetings of clubs or organizations: Not on file     Relationship status: Not on file    Intimate partner violence     Fear of current or ex partner: Not on file     Emotionally abused: Not on file     Physically abused: Not on file     Forced sexual activity: Not on file   Other Topics Concern     Service Not Asked    Blood Transfusions Not Asked    Caffeine Concern Not Asked    Occupational Exposure Not Asked    Hobby Hazards Not Asked    Sleep Concern Not Asked    Stress Concern Not Asked    Weight Concern Not Asked    Special Diet Not Asked    Back Care Not Asked    Exercise Not Asked    Bike Helmet Not Asked   [address] Not Asked    Self-Exams Not Asked   Social History Narrative    Not on file       Visit Vitals  /84 (BP 1 Location: Right arm, BP Patient Position: Sitting)   Pulse 93   Temp 97.5 °F (36.4 °C) (Temporal)   Resp 16   Ht 6' (1.829 m)   Wt 93.9 kg (207 lb)   SpO2 98%   BMI 28.07 kg/m²         PHYSICAL EXAMINATION:  GENERAL: Alert and oriented x3, in no acute distress, well-developed, well-nourished, afebrile. HEART: No JVD. EYES: No scleral icterus   NECK: No significant lymphadenopathy   LUNGS: No respiratory compromise or indrawing  ABDOMEN: Soft, non-tender, non-distended. Electronically signed by: Jayden Louis MD            I, Jessica Victor M.D., have reviewed the history, physical, and have updated the allergic reactions for Faustino Finger. Violeta Ibrahim     TIME OUT performed immediately prior to the start of procedure:  Afshan Pollock M.D., have performed the following reviews on Faustino Finger. prior to the start of the procedure:    - Patient was identified by name and date of birth  - Agreement on procedure being performed was verified  - Risks and benefits explained to the patient  -Patient was positioned for comfort  - Consent was signed and verified    Time: 3:20 PM     Body Part: right trochanteric bursa    Medication and Dose: 1mL Celestone preparation, i.e. 6 mg. Date of Procedure: 01/21/21    PROCEDURE PERFORMED BY : Ernestine Wylie M.D., [facility])    Provider Assisted by: Bernardino Almanzar    Patient assisted by: self    Patient tolerated procedure well with no complications

## 2021-02-10 NOTE — PROGRESS NOTES
[facility]  [address], [place]   Phone: [phone]  Fax: [phone]        PROGRESS NOTE      HISTORY OF PRESENT ILLNESS:  The patient is a 38 y.o. male and was seen today for follow up of low back and right groin pain radiating anteromedially into the RLE to the knee. Previously, he was seen for low back pain radiating into the RLE in an L5-S1 distribution to the knee. Additionally, he endorses paraesthesias in the inferior aspect of his bilateral feet. Initially had c/o low back pain radiating into the BLE in and L4 distribution to the knees since work injury in 2007, worse x 3 weeks. Prior to the acute exacerbation, he reports his chronic baseline level of pain 4/10. Pt reports immediate onset of pain following injury. Originally through Workers comp but they have since settled. His groin pain is increased by activities such as putting on socks and shoes and getting into and out of a car. Pt had an increase in right groin pain radiating anteromedially into the RLE to the knee. Pt treats with Toradol, with relief. Pt denies h/o spinal surgery. Pt reports he had spinal injections x 2017+. Pt underwent right SI joint injection on 8/22/19 with no relief. Pt went through physical therapy x 1 year ago with no relief. He is non compliant with his HEP. He previously did not tolerate NEURONTIN secondary to sedation. He did not tolerate Cymbalta secondary to hallucinations. Patient reports an episode of bladder incontinence x 7/2019. He later clarified he could not make it to the bathroom in time. Pt denies fever, weight loss, or skin changes. The patient is RHD. Note from Katlin Contreras NP dated 12/20/08 indicating patient was seen with c/o lower back pain. Indicated he had improvement with PT. Note from Dr. Vesna Hayes 5/18/16 indicating patient was seen with c/o back and leg pain. Indicated he was seen for a second opinion for his back and leg pain.  Previously followed by [facility]. Original work injury 7/27/08. Indicated he had blocks x2 in 2013, x1 in 2014, and x1 in 2015. Indicated he had relief with those injections. Note from Dr. Linda Bojorquez 8/16/16 indicating patient was seen with c/o low back and leg pain. Indicated his MRI showed evidence of left sided lumbar radiculopathy. Pt underwent left sided SNRB on 4/9/15 with Dr. Faith Luna. Pt underwent right sided SNRB on 8/5/14 with Dr. Faith Luna. Pt underwent left sided L4 SNRB on 12/12/13 with Dr. Faith Luna. Pt underwent left sided L4 SNRB on 5/16/13 with Dr. Libia Hudson note from Dr. Omar Obrien patient presented for back pain radiating into the BLE (L>R), which started 1 day prior. Indicated he previously was on Neurontin, but noted he ran out x 1 year ago. He previously treated with oxycodone and acetaminophen without relief. Restarted his Neurontin at 300 mg qhs, provided him a Rx for prednisone, and treated him with a Toradol injection. Note from Dr. Danielle Robles 10/15/2020 indicating patient was seen with c/o right hip, low back and groin pain. Pt has h/o steroid use. Pt has pain putting on shoes and socks. Mild to moderate arthritis. Ordered an MRI of the hip. Preliminary reading of lumbar plain films: Mild disc space narrowing at L4 on L5 and L5 on S1. Small anterior osteophytes on L4- L5. No acute pathology identified. Lumbar spine MRI dated 7/10/19 reviewed. Per report, degenerative changes in the 3 lower most lumbar levels as describes with no spinal stenosis. RLE EMG dated 8/19/2020 by Dr. Thanh Urbina was normal. Preliminary reading of pelvic plain films dated 10/14/2020 revealed: No acute pathology identified. Degenerative changes on the right relative to the left hip joint. RT Hip MRI dated 11/23/2020 films not independently reviewed. Per report, no evidence of fracture, stress fracture, or avascular necrosis involving the right hip joint.  Mild-moderate right hip joint chondrosis with small foci of subchondral marrow edema. No significant joint effusion or synovitis. Mild, likely degenerative fraying anterosuperior acetabular labrum. At his last clinical appointment, I increased his Lyrica from 75 mg BID to 150 mg BID      The patient returns today with right-sided lower back and buttocks pain radiating into the anterior right thigh. He rates his pain 3/10, previously 1-8/10. He is tolerating the increased dose of Lyrica 150 mg BID with benefit. He performs his HEP 2-3 x/week. Pt denies change in bowel or bladder habits. Note from Dr. David Weinstein dated 1/21/2021 indicating patient was seen with c/o right-sided pain. Pt has trouble sitting. Endorses mild groin discomfort. MRI of the right hip indicated mild to moderate arthritis and fraying of the labrum. No surgery on the hip at this time. Recommended PT and hip injection. Pt underwent bursa injection.  reviewed. Body mass index is 29.15 kg/m².     PCP: Brock Hernandez NP      Past Medical History:   Diagnosis Date    Arthritis     Hypertension         Social History     Socioeconomic History    Marital status:      Spouse name: Not on file    Number of children: Not on file    Years of education: Not on file    Highest education level: Not on file   Occupational History    Not on file   Social Needs    Financial resource strain: Not on file    Food insecurity     Worry: Not on file     Inability: Not on file    Transportation needs     Medical: Not on file     Non-medical: Not on file   Tobacco Use    Smoking status: Former Smoker    Smokeless tobacco: Never Used   Substance and Sexual Activity    Alcohol use: Not Currently    Drug use: Not Currently    Sexual activity: Not on file   Lifestyle    Physical activity     Days per week: Not on file     Minutes per session: Not on file    Stress: Not on file   Relationships    Social connections     Talks on phone: Not on file     Gets together: Not on file     Attends Quaker service: Not on file     Active member of club or organization: Not on file     Attends meetings of clubs or organizations: Not on file     Relationship status: Not on file    Intimate partner violence     Fear of current or ex partner: Not on file     Emotionally abused: Not on file     Physically abused: Not on file     Forced sexual activity: Not on file   Other Topics Concern     Service Not Asked    Blood Transfusions Not Asked    Caffeine Concern Not Asked    Occupational Exposure Not Asked   Terie Hylan Hazards Not Asked    Sleep Concern Not Asked    Stress Concern Not Asked    Weight Concern Not Asked    Special Diet Not Asked    Back Care Not Asked    Exercise Not Asked    Bike Helmet Not Asked   [address] Not Asked    Self-Exams Not Asked   Social History Narrative    Not on file       Current Outpatient Medications   Medication Sig Dispense Refill    pregabalin (Lyrica) 150 mg capsule Take 1 Cap by mouth two (2) times a day. Max Daily Amount: 300 mg. 60 Cap 1    MEN'S MULTI-VITAMIN PO Take  by mouth.  pregabalin (Lyrica) 75 mg capsule Take 1 Cap by mouth two (2) times a day. Max Daily Amount: 150 mg. 60 Cap 0    gabapentin (NEURONTIN) 300 mg capsule Take 1 Cap by mouth three (3) times daily. Max Daily Amount: 900 mg. (Patient not taking: Reported on 12/9/2020) 90 Cap 1    ketorolac (TORADOL) 10 mg tablet Take  by mouth. Allergies   Allergen Reactions    Banana Hives          PHYSICAL EXAMINATION    Visit Vitals  /84 (BP 1 Location: Left upper arm)   Pulse 75   Temp 97.4 °F (36.3 °C)   Resp 17   Ht 5' 11\" (1.803 m)   Wt 209 lb (94.8 kg)   SpO2 97%   BMI 29.15 kg/m²       CONSTITUTIONAL: NAD, A&O x 3  SENSATION: Intact to light touch throughout  RANGE OF MOTION: The patient has full passive range of motion in all four extremities. MOTOR:  Straight Leg Raise: Negative, bilateral    Ambulates with a single point cane. Hip Flex Knee Ext Knee Flex Ankle DF GTE Ankle PF Tone   Right +4/5 +4/5 +4/5 +4/5 +4/5 +4/5 +4/5   Left +4/5 +4/5 +4/5 +4/5 +4/5 +4/5 +4/5       ASSESSMENT   Diagnoses and all orders for this visit:    1. Hip pain  -     REFERRAL TO PHYSICAL THERAPY  -     pregabalin (Lyrica) 150 mg capsule; Take 1 Cap by mouth two (2) times a day. Max Daily Amount: 300 mg.    2. Low back pain at multiple sites  -     REFERRAL TO PHYSICAL THERAPY  -     pregabalin (Lyrica) 150 mg capsule; Take 1 Cap by mouth two (2) times a day. Max Daily Amount: 300 mg.    3. Lumbosacral spondylosis without myelopathy  -     REFERRAL TO PHYSICAL THERAPY  -     pregabalin (Lyrica) 150 mg capsule; Take 1 Cap by mouth two (2) times a day. Max Daily Amount: 300 mg.    4. Lumbar neuritis  -     REFERRAL TO PHYSICAL THERAPY  -     pregabalin (Lyrica) 150 mg capsule; Take 1 Cap by mouth two (2) times a day. Max Daily Amount: 300 mg.    5. DDD (degenerative disc disease), lumbar  -     REFERRAL TO PHYSICAL THERAPY  -     pregabalin (Lyrica) 150 mg capsule; Take 1 Cap by mouth two (2) times a day. Max Daily Amount: 300 mg.    6. Osteoarthritis of right hip, unspecified osteoarthritis type  -     REFERRAL TO PHYSICAL THERAPY  -     pregabalin (Lyrica) 150 mg capsule; Take 1 Cap by mouth two (2) times a day. Max Daily Amount: 300 mg. IMPRESSION AND PLAN:  Patient returns to the office today with c/o  right-sided lower back and buttocks pain radiating into the anterior right thigh. Multiple treatment options were discussed. I offered to increase his Lyrica, pt declined. I provided him refills of Lyrica 150 mg BID. I will refer him to physical therapy with an emphasis on HEP. Patient is neurologically intact. I will see the patient back in 6 week's time or earlier if needed. Written by Dipak Salmon, as dictated by Herminio Jimenez MD  I examined the patient, reviewed and agree with the note.

## 2021-02-12 ENCOUNTER — OFFICE VISIT (OUTPATIENT)
Dept: ORTHOPEDIC SURGERY | Age: 41
End: 2021-02-12
Payer: COMMERCIAL

## 2021-02-12 VITALS
HEIGHT: 71 IN | RESPIRATION RATE: 17 BRPM | TEMPERATURE: 97.4 F | BODY MASS INDEX: 29.26 KG/M2 | HEART RATE: 75 BPM | OXYGEN SATURATION: 97 % | DIASTOLIC BLOOD PRESSURE: 84 MMHG | WEIGHT: 209 LBS | SYSTOLIC BLOOD PRESSURE: 118 MMHG

## 2021-02-12 DIAGNOSIS — M51.36 DDD (DEGENERATIVE DISC DISEASE), LUMBAR: ICD-10-CM

## 2021-02-12 DIAGNOSIS — M54.16 LUMBAR NEURITIS: ICD-10-CM

## 2021-02-12 DIAGNOSIS — M47.817 LUMBOSACRAL SPONDYLOSIS WITHOUT MYELOPATHY: ICD-10-CM

## 2021-02-12 DIAGNOSIS — M54.50 LOW BACK PAIN AT MULTIPLE SITES: ICD-10-CM

## 2021-02-12 DIAGNOSIS — M25.559 HIP PAIN: Primary | ICD-10-CM

## 2021-02-12 DIAGNOSIS — M16.11 OSTEOARTHRITIS OF RIGHT HIP, UNSPECIFIED OSTEOARTHRITIS TYPE: ICD-10-CM

## 2021-02-12 PROCEDURE — 99213 OFFICE O/P EST LOW 20 MIN: CPT | Performed by: PHYSICAL MEDICINE & REHABILITATION

## 2021-02-12 RX ORDER — PREGABALIN 150 MG/1
150 CAPSULE ORAL 2 TIMES DAILY
Qty: 60 CAP | Refills: 2 | Status: SHIPPED | OUTPATIENT
Start: 2021-02-12 | End: 2021-11-09 | Stop reason: ALTCHOICE

## 2021-02-12 NOTE — LETTER
2/12/2021 Patient: Joseph Cruz. YOB: 1980 Date of Visit: 2/12/2021 Elizabeth Bridges NP 
[address] [facility] [address] Via Fax: [phone] Dear Elizabeth Bridges NP, Thank you for referring Mr. Neymar Doherty to [address] for evaluation. My notes for this consultation are attached. If you have questions, please do not hesitate to call me. I look forward to following your patient along with you. Sincerely, Maria Elena Mclaughlin MD

## 2021-02-18 ENCOUNTER — HOSPITAL ENCOUNTER (OUTPATIENT)
Dept: PHYSICAL THERAPY | Age: 41
Discharge: HOME | End: 2021-02-18
Payer: COMMERCIAL

## 2021-02-18 PROCEDURE — 97161 PT EVAL LOW COMPLEX 20 MIN: CPT

## 2021-02-18 NOTE — PROGRESS NOTES
PT DAILY TREATMENT NOTE/LUMBAR EVAL 10-18    Patient Name: Melia Anders. Date:2021  : 1980  [x]  Patient  Verified  Payor: [address] [place] [address] Road / Plan: [address] / Product Type: Managed Care Medicaid /    In time:0910  Out YFJN:[id number]  Total Treatment Time (min): 46  Visit #: 1    Treatment Area: Pain in unspecified hip [M25.559]  Low back pain [M54.5]    Pain Level (0-10 scale): 5/10  [x]Sharp  []Dull  [x]Achy []Burning []Throbbing [x]N&T []Other:  []constant []intermittent []improving []worsening []no change since onset    Any medication changes, allergies to medications, adverse drug reactions, diagnosis change, or new procedure performed?: [x] No    [] Yes (see summary sheet for update)  Subjective:     Pt presents to PT complaining of low back \"aching\", sharp, and N/T pain spreading to RLE after accident in [place] in . Pt also complains of dull R hip pain spreading from anteriomedial R hip to R knee. Pt reports he was lifting a piece of ventilation, set it down from the crane, then \"stood up and hurt his back\", and herniated a disc. Pt reports he has R hip bursitis and has had R hip and back injections for years. 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 Pt reports he is currently taking Lyrica. Pt reports he constantly has to change positions to relieve pain. Pt reports pain increases with prolonged sitting (10 min), prolonged standing (10-15 min), and lifting heavy obejects. Pt reports pain decreases with changing positions, rest, and Lyrica. Pt reports he is not currently working because of low back and R hip pain.     Past Medical History:   Diagnosis Date    Arthritis     Hypertension      Past Surgical History:   Procedure Laterality Date    HX VASECTOMY       Imaging: MRI low back 2016 , MRI R hip 20    Patient Goals: Pt would like to be able to perform daily activity without pain.  Previous Treatment/Compliance: 2014 low back and R hip      Symptoms:  Aggravated by:   [x] Bending [x] Sitting [x] Standing [x] Walking   [x] Moving [] Cough [] Sneeze [] Valsalva   [] AM  [] PM  Lying:  [] sup   [] pro   [] sidelying   [] Other:     Eased by:    [] Bending [] Sitting [] Standing [] Walking   [] Moving [] AM  [] PM  Lying: [] sup  [] pro  [] sidelying   [x] Other: Rest     General Health:  Red Flags Indicated? [] Yes    [x] No  [] Yes [] No Recent weight change (If yes, due to dieting? [] Yes  [] No)   [] Yes [] No Weakness in legs during walking  [] Yes [] No Unremitting pain at night  [] Yes [] No Abdominal pain or problems  [] Yes [] No Rectal bleeding  [] Yes [] No Feet more cold or painful in cold weather  [] Yes [] No Menstrual irregularities  [] Yes [] No Blood or pain with urination  [] Yes [] No Dysfunction of bowel or bladder  [] Yes [] No Recent illness within past 3 weeks (i.e, cold, flu)  [] Yes [] No Numbness/tingling in buttock/genitalia region    Past History/Treatments:     Diagnostic Tests: [] Lab work [] X-rays    [] CT [x] MRI     [] Other:  Results: Low back: Degenerative changes in 3 lower lumbar levels. Disc narrowing at L4/L5. Small osteophytes L5/L5. R Hip: \"Mild, degenerative fraying anterosuperior acetabular labrum\"; Refer to chart. Functional Status  Prior level of function: Ind with all functional activity. OBJECTIVE  Posture:  Kyphosis: [] Increased [] Decreased   [x]  WNL  Lordosis:  [x] Increased [] Decreased   [] WNL  Pelvic symmetry: [x] WNL    [] Other:    Gait:  [] Normal     [x] Abnormal: Pt ambulates with antalgic gait pattern using cane in R hand, demonstrating decreased step length.     Active Movements: [] N/A   [] Too acute   [] Other:  ROM % AROM % PROM Comments:pain, area   Forward flexion 40-60 80%     Extension 20-30 25%     SB right 20-30 25%     SB left 20-30 25%     Rotation right 5-10 50%     Rotation left 5-10 50%     Pain in low back with all movements. Neuro Screen [x] WNL  Myotome/Dermatome/Reflexes:  Comments:    Dural Mobility:  SLR Sitting: [] R    [] L    [] +    [] -  @ (degrees):           Supine: [x] R    [x] L    [] +    [x] -  @ (degrees):   Slump Test: [x] R    [x] L    [] +    [x] -  @ (degrees):     Palpation  [] Min  [x] Mod  [] Severe    Location: Low back/gluteal musculature. Neuro Screen:  Myotomes:  L1,2- Hip flexion:   [x] Normal [] Diminished    MMT:  L3,4- Knee extension:  [x] Normal [] Diminished    MMT:  L4- Ankle dorsiflexion: [x] Normal [] Diminished    MMT:  S1- Ankle plantarflexion: [x] Normal [] Diminished    MMT:  S1,2- Knee flexion:  [x] Normal [] Diminished    MMT:  Comments:    Dermatomes:  L2: [x] Normal [] Paraesthesia noted   L3: [x] Normal [] Paraesthesia noted   L4: [x] Normal [] Paraesthesia noted   L5: [x] Normal [] Paraesthesia noted   S1: [x] Normal [] Paraesthesia noted   S2: [x] Normal [] Paraesthesia noted               AROM                       PROM    MMT    Left Right Left Right Left Right   Hip Flexion     3+/5 3+/5    Extension   10 deg 10 deg 3+/5 3+/5    Internal Rotation   20 deg 15 deg      External rotation   40 deg 30 deg     Knee Flexion     3+/5 3+/5    Extension     3+/5 3+/5   Ankle Dorsiflexion     4-/5 4-/5    Plantarflexion     4-/5 4-/5    Pt reports mild pain with gross hip MMT. Special Tests  Lumbar:  Lumb.  Compression: [] Pos  [x] Neg               Lumbar Distraction:   [] Pos  [x] Neg    Quadrant:  [] Pos  [x] Neg   [] Flex  [] Ext         Hip: [place]:  [x] R    [x] L    [] +    [x] -     Scour:  [x] R    [] L    [x] +    [] -     Piriformis: [] R    [] L    [] +    [] -          Deficits: Demetrio's: [x] R    [x] L    [] +    [x] -     Glennie Agreste: [] R    [] L    [] +    [] -     Hamstrings 90/90: Min decrease flexibility    Gastrocsoleus (to neutral): Right: Left:      46 min [x]Eval                  []Re-Eval             With   [x] TE   [] TA   [] neuro   [] other: Patient Education: [x] Review HEP    [] Progressed/Changed HEP based on:   [] positioning   [] body mechanics   [] transfers   [] heat/ice application    [] other:         Pain Level (0-10 scale) post treatment: 5/10    Assessment:     [x]  See Plan of Care  []  See progress note/recertification  []  See Discharge Summary           Plymouthann Navarrete PT, DPT  2/18/2021  9:12 AM

## 2021-02-18 NOTE — PROGRESS NOTES
[address] [facility] Uri Crowell, [address] [facility], [address] [place] [address]  [address]  [place] [address] PHYSICAL THERAPY SERVICES  Patient Name: Joseph Cruz. : 1980   Medical   Diagnosis: Pain in unspecified hip [M25.559]  Low back pain [M54.5] Treatment Diagnosis: Low Back Pain, R Hip Pain   Onset Date:      Referral Source: Geeta Morillo MD [facility]): 2021   Prior Hospitalization: See medical history Provider #: [id number]   Prior Level of Function: Ind with ADls, gait   Comorbidities: OA, HTN   Medications: Verified on Patient Summary List   The Plan of Care and following information is based on the information from the initial evaluation.   ==========================================================================================  Assessment / Functional Analysis:    Pt is a 38y.o. year old male who presents to outpatient clinic today with decreased lumbar and R hip ROM, increased pain, TTP lumbar paraspinals, gluts, and piriformis, and decreased lumbar joint mobility, limiting Pt ability to perform functional activities of choice. Pt demonstrates findings characteristic of lumbar radiculopathy and degenerative changes of R hip.  Pt could benefit from skilled PT services to address above impairments and to increase Pt ability to return to functional activities of choice.    ==========================================================================================  Eval Complexity: History: LOW Complexity : Zero comorbidities / personal factors that will impact the outcome / POCExam:MEDIUM Complexity : 3 Standardized tests and measures addressing body structure, function, activity limitation and / or participation in recreation  Presentation: LOW Complexity : Stable, uncomplicated  Clinical Decision Making:LOW Complexity Overall Complexity:LOW     Problem List: pain affecting function, decrease ROM, decrease strength, impaired gait/ balance, decrease ADL/ functional abilitiies, decrease activity tolerance and decrease flexibility/ joint mobility   Treatment Plan may include any combination of the following: Therapeutic exercise, Therapeutic activities, Physical agent/modality, Gait/balance training, Manual therapy, Patient education, Functional mobility training and Stair training  Patient / Family readiness to learn indicated by: asking questions, trying to perform skills and interest  Persons(s) to be included in education: patient (P)  Barriers to Learning/Limitations: None      Patient self reported health status: good  Rehabilitation Potential: good    Objective Measures:    Short term goals (to be completed in 4 weeks) Goal Status   1.  Patient will report the knowledge of 3 exercises that can be used to help reduce symptoms to be able to ind reduce symptoms while at home. Status at last Eval: Initiated HEP Current Status:      2.  Patient will demonstrate a 1/2 grade improvement in LE MMT to be able to better ambulate with a normalized gait without pain. Status at last Eval: See chart Current Status:      3.  Patient will report less than 3/10 pain to demonstrate an improvement in quality of life   Status at last Eval: 5/10 Current Status:                       Long term goals (to be completed in 8 weeks) Goal Status   1.  Patient will report the ability to stand for > 1 hour without the onset of pain to allow patient to return to working.    Status at last Eval: Unable, 10-15 minute limit Current Status:      2.  Patient will demonstrate 4+/5 B hip extensor MMT to be able to better support pelvis during quiet standing. Status at last Eval: See chart Current Status:      3.  Patient will demonstrate an Oswestry Disability Index score of 10% or less to demonstrate reduced lumbar disability impacting patient's daily hobbies, work duties, and daily chores.    Status at last Eval:  Current Status:               Frequency / Duration: Patient to be seen  2  times per week for 8  weeks:  Patient / Caregiver education and instruction: activity modification and exercises    Therapist Signature: Mayra Lozano PT. DPLORENA Date: 8/66/9373   Certification Period: 02/18/2021 - 04/15/2021 Time: 10:25 AM   ===========================================================================================  I certify that the above Physical Therapy Services are being furnished while the patient is under my care. I agree with the treatment plan and certify that this therapy is necessary. Physician Signature:        Date:       Time:     Please sign and return to [facility] PT or you may fax the signed copy to [phone]. Please call [phone] if more information required. Thank you.

## 2021-03-02 ENCOUNTER — HOSPITAL ENCOUNTER (OUTPATIENT)
Dept: PHYSICAL THERAPY | Age: 41
Discharge: HOME | End: 2021-03-02
Payer: COMMERCIAL

## 2021-03-02 PROCEDURE — 97110 THERAPEUTIC EXERCISES: CPT

## 2021-03-02 NOTE — PROGRESS NOTES
PT DAILY TREATMENT NOTE     Patient Name: Bret Jacobson. Date:3/2/2021  : 1980  [x]  Patient  Verified  Payor: [address] / Plan: [address] / Product Type: Managed Care Medicaid /    In time:1304 Out time:1354  Total Treatment Time (min):  50  Total Timed Codes (min): 48       Treatment Area: Pain in unspecified hip [M25.559]  Low back pain [M54.5]    SUBJECTIVE  Pain Level (0-10 scale): 410  Any medication changes, allergies to medications, adverse drug reactions, diagnosis change, or new procedure performed?: [x] No    [] Yes (see summary sheet for update)  Subjective functional status/changes:   [x] No changes reported  Stated \"Im sore from doing the exercise at home\"  OBJECTIVE      48 min Therapeutic Exercise:  [x] See flow sheet :   Rationale: increase ROM and increase strength to improve the patients ability to return to work and perform daily task without pain. min Patient Education: [x] Review HEP    [] Progressed/Changed HEP based on:   [x] positioning   [x] body mechanics   [] transfers   [] heat/ice application        Other Objective/Functional Measures:     Pain Level (0-10 scale) post treatment: 2/10  ASSESSMENT/Changes in Function: Patient performed all exercise per flow sheet. Needed reminders to stay on task with exercise. Educated on performing exercise without increased pain and guarding. Cued patient on core stability and setting with strengthening exercise. See flow sheet for exercise details. Patient will continue to benefit from skilled PT services to modify and progress therapeutic interventions, address strength deficits, analyze and address soft tissue restrictions, analyze and cue movement patterns, analyze and modify body mechanics/ergonomics and assess and modify postural abnormalities to attain remaining goals.      [x]  See Plan of Care  []  See progress note/recertification  []  See Discharge Summary Progress towards goals / Updated goals: Will continue to progress with core strength and pain management through stretching.      PLAN  []  Upgrade activities as tolerated     [x]  Continue plan of care  []  Update interventions per flow sheet       []  Discharge due to:_  []  Other:_      Ronnie Dickson 3/2/2021  1:09 PM

## 2021-03-04 ENCOUNTER — HOSPITAL ENCOUNTER (OUTPATIENT)
Dept: PHYSICAL THERAPY | Age: 41
Discharge: HOME | End: 2021-03-04
Payer: COMMERCIAL

## 2021-03-04 PROCEDURE — 97110 THERAPEUTIC EXERCISES: CPT

## 2021-03-04 NOTE — PROGRESS NOTES
PT DAILY TREATMENT NOTE 8-    Patient Name: Brady Christine. Date:3/4/2021  : 1980  [x]  Patient  Verified  Payor: [address] / Plan: [address] / Product Type: Managed Care Medicaid /    In time: 9782  Out time:1101  Total Treatment Time (min): 43  Total Timed Codes (min): 33  1:1 Treatment Time (min): 33   Visit # 3      Treatment Area: Pain in unspecified hip [M25.559]  Low back pain [M54.5]    SUBJECTIVE  Pt continues to complain of low back pain at start of today's Tx session. Pt states he is sore from last Tx session. Pain Level (0-10 scale): 4/10  Any medication changes, allergies to medications, adverse drug reactions, diagnosis change, or new procedure performed?: [x] No    [] Yes (see summary sheet for update)        OBJECTIVE  Modality rationale: decrease pain and increase tissue extensibility to improve the patients ability to participate in exercise and return to PLOF.    Min Type Additional Details   10 [x] Estim: []Att   []Unatt  []TENS instruct                 []IFC  []Premod []NMES                       []Other:  []w/US   []w/ice   [x]w/heat  Position: seated  Location: low back    []  Traction: [] Cervical       []Lumbar                       [] Prone          []Supine                       []Intermittent   []Continuous Lbs:  [] before manual  [] after manual    []  Ultrasound: []Continuous   [] Pulsed                           []1MHz   []3MHz Location:  W/cm2:         []  Ice     []  heat  []  Ice massage Position:  Location:    []  Vasopneumatic Device Pressure: [] lo [] med [] hi   Temp: [] lo [] med [] hi   [x] Skin assessment post-treatment:  [x]intact []redness- no adverse reaction       []redness - adverse reaction:       33 min Therapeutic Exercise:  [x] See flow sheet :   Rationale: increase ROM and increase strength to improve the patients ability to return to prior level of function before injury/illness with reduced pain, achieving optimal strength and function to perform household tasks, daily activities, and return to work if applicable. With TE Patient Education: [x] Review HEP    [] Progressed/Changed HEP based on:   [] positioning   [] body mechanics   [] transfers   [] heat/ice application          Pain Level (0-10 scale) post treatment: 4/10    ASSESSMENT/Changes in Function:   Today's Tx session began with E-Stim with MHP to decrease pain and increase tissue extensibility. Pt then performed exercises to increase core stabilization and increase BLE strength. Pt continued to require extensive vc to remain on task with exercises. Patient will continue to benefit from skilled PT services to modify and progress therapeutic interventions, address functional mobility deficits, address ROM deficits, address strength deficits, analyze and address soft tissue restrictions, analyze and cue movement patterns, analyze and modify body mechanics/ergonomics, assess and modify postural abnormalities and instruct in home and community integration to attain remaining goals.      [x]  See Plan of Care  []  See progress note/recertification  []  See Discharge Summary             PLAN  [x]  Upgrade activities as tolerated     [x]  Continue plan of care  []  Update interventions per flow sheet       []  Discharge due to:_  []  Other:_      Vee Bell PT, DPT 3/4/2021  1:53 PM

## 2021-03-09 ENCOUNTER — HOSPITAL ENCOUNTER (OUTPATIENT)
Dept: PHYSICAL THERAPY | Age: 41
Discharge: HOME | End: 2021-03-09
Payer: COMMERCIAL

## 2021-03-09 PROCEDURE — 97110 THERAPEUTIC EXERCISES: CPT

## 2021-03-09 PROCEDURE — 97014 ELECTRIC STIMULATION THERAPY: CPT

## 2021-03-09 NOTE — PROGRESS NOTES
PT DAILY TREATMENT NOTE 8-14    Patient Name: Brady Christine. Date:3/9/2021  : 1980  [x]  Patient  Verified  Payor: [address] / Plan: [address] / Product Type: Managed Care Medicaid /    In time:1546  Out time:1640  Total Treatment Time (min): 54  Total Timed Codes (min): 39  1:1 Treatment Time (min): 39  Visit # 4 of 16      Treatment Area: Pain in unspecified hip [M25.559]  Low back pain [M54.5]    SUBJECTIVE  Pt enters today with SPC.   Pain Level (0-10 scale): 3/10  Any medication changes, allergies to medications, adverse drug reactions, diagnosis change, or new procedure performed?: [x] No    [] Yes (see summary sheet for update)        OBJECTIVE  Modality rationale: decrease pain and increase tissue extensibility to improve the patients ability to participate in session   Min Type Additional Details   15 [x] Estim: []Att   [x]Unatt  []TENS instruct                 [x]IFC  []Premod []NMES                       []Other:  []w/US   []w/ice   [x]w/heat  Position: sitting  Location: B lower lumbar    []  Traction: [] Cervical       []Lumbar                       [] Prone          []Supine                       []Intermittent   []Continuous Lbs:  [] before manual  [] after manual    []  Ultrasound: []Continuous   [] Pulsed                           []1MHz   []3MHz Location:  W/cm2:         []  Ice     []  heat  []  Ice massage Position:  Location:    []  Vasopneumatic Device Pressure: [] lo [] med [] hi   Temp: [] lo [] med [] hi   [x] Skin assessment post-treatment:  [x]intact []redness- no adverse reaction       []redness  adverse reaction:       39 min Therapeutic Exercise:  [x] See flow sheet :   Rationale: increase ROM, increase strength, improve coordination and increase proprioception to improve the patients ability to maximize pain-free daily activity and stamina             With TE Patient Education: [x] Review HEP    [] Progressed/Changed HEP based on:   [] positioning   [] body mechanics   [] transfers   [] heat/ice application          Pain Level (0-10 scale) post treatment: 4/10    ASSESSMENT/Changes in Function: Session initiated with MHP + IFC for pain control without adverse reaction. Continued focus on lumbar self stretching and and proximal strengthening in hooklying. Cues provided with SLR to promote quad engagement and endurance. Fatigue noted most on right. Pt cued via demonstration for squat depth and foot placement. Will progress as pain and endurance permit next visit. Patient will continue to benefit from skilled PT services to modify and progress therapeutic interventions, address functional mobility deficits, address ROM deficits, address strength deficits, analyze and cue movement patterns, analyze and modify body mechanics/ergonomics and assess and modify postural abnormalities to attain remaining goals.      [x]  See Plan of Care  []  See progress note/recertification  []  See Discharge Summary             PLAN  []  Upgrade activities as tolerated     [x]  Continue plan of care  []  Update interventions per flow sheet       []  Discharge due to:_  []  Other:_      Roman Beal PT, DPT 3/9/2021  3:46 PM

## 2021-03-11 ENCOUNTER — HOSPITAL ENCOUNTER (OUTPATIENT)
Dept: PHYSICAL THERAPY | Age: 41
Discharge: HOME | End: 2021-03-11
Payer: COMMERCIAL

## 2021-03-11 PROCEDURE — 97014 ELECTRIC STIMULATION THERAPY: CPT

## 2021-03-11 PROCEDURE — 97110 THERAPEUTIC EXERCISES: CPT

## 2021-03-11 NOTE — PROGRESS NOTES
PT DAILY TREATMENT NOTE 8-14    Patient Name: Roberto Carlos Lomax Jr.  Date:3/11/2021  : 1980  [x]  Patient  Verified  Payor: [employer] [place] / [place]: [place] [employer] [place] / Product Type: Managed Care Medicaid /    In time: 1600  Out time:1655  Total Treatment Time (min): 55  Total Timed Codes (min): 40  1:1 Treatment Time (min): 40  Visit #: 5 of 16    Treatment Area: Pain in unspecified hip [M25.559]  Low back pain [M54.5]    SUBJECTIVE  Patient arrived exhibiting antalgic using SPC.     Pain Level (0-10 scale): 5/10    Any medication changes, allergies to medications, adverse drug reactions, diagnosis change, or new procedure performed?: [x] No    [] Yes (see summary sheet for update)        OBJECTIVE  Modality rationale: decrease pain and increase tissue extensibility to improve the patient’s ability to improve the patients ability to participate in today's session.   Min Type Additional Details   15 [x] Estim: []Att   [x]Unatt  []TENS instruct                 [x]IFC  []Premod []NMES                       []Other:  []w/US   []w/ice   [x]w/heat  Position:  Location:    []  Traction: [] Cervical       []Lumbar                       [] Prone          []Supine                       []Intermittent   []Continuous Lbs:  [] before manual  [] after manual    []  Ultrasound: []Continuous   [] Pulsed                           []1MHz   []3MHz Location:  W/cm2:    []  Ice     []  heat  []  Ice massage Position:  Location:    []  Vasopneumatic Device Pressure: [] lo [] med [] hi   Temp: [] lo [] med [] hi   [x] Skin assessment post-treatment:  [x]intact []redness- no adverse reaction       []redness – adverse reaction:       40 min Therapeutic Exercise:  [x] See flow sheet :   Rationale: increase ROM, increase strength, improve coordination and increase proprioception to improve the patient’s ability to improve the patient’s ability to maximize pain-free daily activity and stamina              With TE  Patient Education: [x] Review HEP    [] Progressed/Changed HEP based on:   [] positioning   [] body mechanics   [] transfers   [] heat/ice application        Patient's response to today's treatment: Pain reduced during treatment. Progression limited this session due to patient distraction. Pain Level (0-10 scale) post treatment: 2/10    ASSESSMENT/Changes in Function: Session initiated with MHP + IFC for pain control without adverse reaction. Continued focus on lumbar self stretching and and proximal strengthening in hooklying. Pt performed B SLR to promote quad engagement and endurance. Fatigue noted most on right. Continued to cue patient via demonstration for squat depth and foot placement. Will progress as pain and endurance tolerates. Patient will continue to benefit from skilled PT services to modify and progress therapeutic interventions, address functional mobility deficits, address ROM deficits, address strength deficits, analyze and cue movement patterns, analyze and modify body mechanics/ergonomics and assess and modify postural abnormalities to attain remaining goals.      [x]  See Plan of Care  []  See progress note/recertification  []  See Discharge Summary           PLAN  []  Upgrade activities as tolerated     [x]  Continue plan of care  []  Update interventions per flow sheet       []  Discharge due to:_  []  Other:_      ISABELLA Lopez 3/11/2021  4:09 PM

## 2021-03-16 ENCOUNTER — HOSPITAL ENCOUNTER (OUTPATIENT)
Dept: PHYSICAL THERAPY | Age: 41
Discharge: HOME | End: 2021-03-16
Payer: COMMERCIAL

## 2021-03-16 PROCEDURE — 97032 APPL MODALITY 1+ESTIM EA 15: CPT

## 2021-03-16 PROCEDURE — 97110 THERAPEUTIC EXERCISES: CPT

## 2021-03-16 NOTE — PROGRESS NOTES
PT DAILY TREATMENT NOTE 8-    Patient Name: Roberto Carlos Lomax Jr.  Date:3/16/2021  : 1980  [x]  Patient  Verified  Payor: [employer] / [place] [employer] [place] / Product Type: Managed Care Medicaid /    In time: 1600  Out time:1700  Total Treatment Time (min): 60  Total Timed Codes (min): 60  1:1 Treatment Time (min): 60   Visit #: 6 of 16    Treatment Area: Pain in unspecified hip [M25.559]  Low back pain [M54.5]    SUBJECTIVE  Patient reports that he continues to have pain. Reports that he is not sleeping well at night to go to the bathroom along with the pain waking him 1-2 x a night that he can elevate pain with positional changes.      Pain Level (0-10 scale): 7/10  Any medication changes, allergies to medications, adverse drug reactions, diagnosis change, or new procedure performed?: [x] No    [] Yes (see summary sheet for update)        OBJECTIVE  Modality rationale: decrease inflammation, decrease pain and increase tissue extensibility to improve the patient’s ability to tolerate today's session with improved mobility and decreased pain.   Min Type Additional Details   15 [x] Estim: []Att   [x]Unatt  []TENS instruct                 [x]IFC  []Premod []NMES                       []Other:  []w/US   []w/ice   [x]w/heat  Position:Seated  Location: Lumbar    []  Traction: [] Cervical       []Lumbar                       [] Prone          []Supine                       []Intermittent   []Continuous Lbs:  [] before manual  [] after manual    []  Ultrasound: []Continuous   [] Pulsed                           []1MHz   []3MHz Location:  W/cm2:    []  Ice     []  heat  []  Ice massage Position:  Location:    []  Vasopneumatic Device Pressure: [] lo [] med [] hi   Temp: [] lo [] med [] hi   [] Skin assessment post-treatment:  []intact []redness- no adverse reaction       []redness – adverse reaction:       45 min Therapeutic Exercise:  [x] See flow sheet :   Rationale: increase ROM,  increase strength and improve coordination to improve the patients ability to return to prior level of function before injury/illness with reduced pain, achieving optimal strength and function to perform household tasks, daily activities, and return to community events, and/or work. With TE Patient Education: [x] Review HEP    [] Progressed/Changed HEP based on:   [] positioning   [] body mechanics   [] transfers   [] heat/ice application        Patient's response to today's treatment: Pain decreased during treatment. Patient was focused this session on his treatment and PTA was able to assess his \"true pain\". Pain Level (0-10 scale) post treatment: 5/10    ASSESSMENT/Changes in Function: Session began with MHP+IFES followed by supine exercise for lumbar stabilization. Continued with POC with exercises as noted per flow sheet. Pt able to tolerate today's session with minimal increase in pain, which resolved post exercise. Will cont to progress as able. Plan to revamp patients exercise to better address his pain and mobility. Patient will continue to benefit from skilled PT services to modify and progress therapeutic interventions, address functional mobility deficits, address ROM deficits, address strength deficits, analyze and address soft tissue restrictions, analyze and cue movement patterns and analyze and modify body mechanics/ergonomics to attain remaining goals.      [x]  See Plan of Care  []  See progress note/recertification  []  See Discharge Summary           PLAN  []  Upgrade activities as tolerated     [x]  Continue plan of care  []  Update interventions per flow sheet       []  Discharge due to:_  []  Other:_      ISABELLA Amaya 3/16/2021  4:26 PM

## 2021-03-18 ENCOUNTER — HOSPITAL ENCOUNTER (OUTPATIENT)
Dept: PHYSICAL THERAPY | Age: 41
Discharge: HOME | End: 2021-03-18
Payer: COMMERCIAL

## 2021-03-18 PROCEDURE — 97014 ELECTRIC STIMULATION THERAPY: CPT

## 2021-03-18 PROCEDURE — 97110 THERAPEUTIC EXERCISES: CPT

## 2021-03-18 NOTE — PROGRESS NOTES
PT DAILY TREATMENT NOTE 8-14    Patient Name: Manuel Ware. Date:3/18/2021  : 1980  [x]  Patient  Ver[facility]  [facility] [address] / Plan: [address] / Product Type: Managed Care Medicaid /    In time:1607  Out time:1710  Total Treatment Time (min): 63  Total Timed Codes (min): 63  1:1 Treatment Time (min): 63   Visit #: 7 of 16    Treatment Area: Pain in unspecified hip [M25.559]  Low back pain [M54.5]    SUBJECTIVE  Observed patient walking in with SPC with good posture and fluidity. Patient reports that he has less pain this date. Patient reports no change with his sleeping, he currently not working and has been using his [address] since . Pain Level (0-10 scale): 4/10    Any medication changes, allergies to medications, adverse drug reactions, diagnosis change, or new procedure performed?: [x] No    [] Yes (see summary sheet for update)        OBJECTIVE  Modality rationale: decrease inflammation, decrease pain and increase tissue extensibility to improve the patients ability to tolerate today's session with least amount of pain.    Min Type Additional Details   15 [x] Estim: []Att   [x]Unatt  []TENS instruct                 [x]IFC  []Premod []NMES                       []Other:  []w/US   []w/ice   [x]w/heat  Position:Prone with pillow under hips  Location: lumbar    []  Traction: [] Cervical       []Lumbar                       [] Prone          []Supine                       []Intermittent   []Continuous Lbs:  [] before manual  [] after manual    []  Ultrasound: []Continuous   [] Pulsed                           []1MHz   []3MHz Location:  W/cm2:   10 [x]  Ice     []  heat  []  Ice massage Position:Seated  Location: Lumbar    []  Vasopneumatic Device Pressure: [] lo [] med [] hi   Temp: [] lo [] med [] hi   [] Skin assessment post-treatment:  []intact []redness- no adverse reaction       []redness  adverse reaction:       38 min Therapeutic Exercise:  [x] See flow sheet :   Rationale: increase ROM, increase strength, improve coordination, improve balance and increase proprioception to improve the patients ability to return to prior level of function before injury/illness with reduced pain, achieving optimal strength and function to perform household tasks, daily activities, and return to community events, and/or work. With TE Patient Education: [x] Review HEP    [] Progressed/Changed HEP based on:   [] positioning   [] body mechanics   [] transfers   [] heat/ice application        Patient's response to today's treatment: Pain level increased with exercise but decreased after use of CP. Pain Level (0-10 scale) post treatment: 4/10    ASSESSMENT/Changes in Function: Session initiated with MHP and IFES with patient in prone position and a pillow under his hips. Patient was able to rest in this position and tolerate this treatment x 15 minutes. Continued exercise and stretching in prone position with prone press ups, prone hip extension, child pose stretch, cat and camel, bird dog with using alternating LE's and standing lumbar extension. Ended session with CP with position in seated position. Patient will continue to benefit from skilled PT services to modify and progress therapeutic interventions, address functional mobility deficits, address ROM deficits, address strength deficits, analyze and address soft tissue restrictions, analyze and cue movement patterns, analyze and modify body mechanics/ergonomics and assess and modify postural abnormalities to attain remaining goals.      [x]  See Plan of Care  []  See progress note/recertification  []  See Discharge Summary           PLAN  []  Upgrade activities as tolerated     [x]  Continue plan of care  []  Update interventions per flow sheet       []  Discharge due to:_  []  Other:_      ISABELLA Henderson 3/18/2021  4:14 PM

## 2021-03-23 ENCOUNTER — HOSPITAL ENCOUNTER (OUTPATIENT)
Dept: PHYSICAL THERAPY | Age: 41
Discharge: HOME | End: 2021-03-23
Payer: COMMERCIAL

## 2021-03-23 PROCEDURE — 97032 APPL MODALITY 1+ESTIM EA 15: CPT

## 2021-03-23 NOTE — PROGRESS NOTES
PT DAILY TREATMENT NOTE 8-14    Patient Name: Leesa Richardson. Date:3/23/2021  : 1980  [x]  Patient  Verified  Payor: [facility] [address] / Plan: [address] / Product Type: Managed Care Medicaid /    In time: 6005 Out time: 6243  Total Treatment Time (min): 38  Total Timed Codes (min): 15  1:1 Treatment Time (min): 15   Visit # 8      Treatment Area: Pain in unspecified hip [M25.559]  Low back pain [M54.5]    SUBJECTIVE  Pt complains that low back pain has increased since last Tx session and states that he believes he has not made progress with PT. Pt continues to complain of difficulty with all functional activity and states his symptoms have yet to improve. Pain Level (0-10 scale): 5/10  Any medication changes, allergies to medications, adverse drug reactions, diagnosis change, or new procedure performed?: [x] No    [] Yes (see summary sheet for update)        OBJECTIVE  Modality rationale: decrease inflammation, decrease pain and increase tissue extensibility to improve the patients ability to participate in physical therapy.    Min Type Additional Details   15 [x] Estim: [x]Att   []Unatt  []TENS instruct                 [x]IFC  []Premod []NMES                       []Other:  []w/US   []w/ice   [x]w/heat  Position: sitting  Location: lumbar    []  Traction: [] Cervical       []Lumbar                       [] Prone          []Supine                       []Intermittent   []Continuous Lbs:  [] before manual  [] after manual    []  Ultrasound: []Continuous   [] Pulsed                           []1MHz   []3MHz Location:  W/cm2:         []  Ice     []  heat  []  Ice massage Position:   Location:    []  Vasopneumatic Device Pressure: [] lo [] med [] hi   Temp: [] lo [] med [] hi   [x] Skin assessment post-treatment:  [x]intact []redness- no adverse reaction       []redness  adverse reaction:            With TE Patient Education: [x] Review HEP    [] Progressed/Changed HEP based on: [] positioning   [] body mechanics   [] transfers   [] heat/ice application          Pain Level (0-10 scale) post treatment: 5/10     ASSESSMENT/Changes in Function:   Today's Tx session began with MHP to decrease pain and increase tissue extensibility. Per Pt, he believes he has not made progress with PT and continues to complain of symptoms of similar severity, sometimes greater severity than when he first began PT. Pt presents with continued decreased lumbar ROM, decreased strength, increased pain, and decreased ability to ambulate with normalized gait pattern. Pt demonstrates appropriateness for discharge at this time as Pt has yet to progress with PT goals. Pt agrees with PT decision to discharge at this time. []  See Plan of Care  []  See progress note/recertification  [x]  See Discharge Summary             PLAN  []  Upgrade activities as tolerated     []  Continue plan of care  []  Update interventions per flow sheet       [x]  Discharge due to: Pt no longer making progress toward goals.   []  Other:_      Shania Guadarrama, PT, DPT 3/23/2021  4:56 PM

## 2021-03-23 NOTE — PROGRESS NOTES
[facility] PHYSICAL THERAPY  [address] [place] [address] Dr [facility], [address] [place] [address], [facility] * Phone: [phone] * Fax: [phone] FOR PHYSICAL THERAPY            Patient Name: Galindo Norman. : 1980   Treatment/Medical Diagnosis: Pain in unspecified hip [M25.559]  Low back pain [M54.5]   Onset Date:     Referral Source: Cristian Serrano MD [place] [facility]): 2021   Prior Hospitalization: See Medical History Provider #: [id number]   Prior Level of Function: Ind with ADls, gait   Comorbidities: OA, HTN   Medications: Verified on Patient Summary List   Visits from [facility]: 8 Missed Visits: 0       Subjective:  Pt complains that low back pain has increased since last Tx session and states that he believes he has not made progress with PT. Pt continues to complain of difficulty with all functional activity and states his symptoms have yet to improve. Objective:     SHAVONNE: 78%    Active Movements: []? N/A   []? Too acute   []? Other:  ROM % AROM % PROM Comments:pain, area   Forward flexion 40-60 60%    Pain spread across lumbar region   Extension 20-30 25%       SB right 20-30 25%       SB left 20-30 25%       Rotation right 5-10 50%       Rotation left 5-10 50%       Pain in low back with all movements.      AROM                       PROM                         MMT      Left Right Left Right Left Right   Hip Flexion         3+/5 3+/5     Extension     10 deg 10 deg 3+/5 3+/5     Internal Rotation     20 deg 15 deg         External rotation     40 deg 30 deg       Knee Flexion         3+/5 3+/5     Extension         3+/5 3+/5   Ankle Dorsiflexion         4-/5 4-/5     Plantarflexion         4-/5 4-/5    Pt reports mild pain with gross hip MMT. Short term goals (to be completed in 4 weeks) Goal Status   1.  Patient will report the knowledge of 3 exercises that can be used to help reduce symptoms to be able to ind reduce symptoms while at home.    Status at last Eval: Initiated HEP Current Status:  Knowledgable Met    2.  Patient will demonstrate a 1/2 grade improvement in LE MMT to be able to better ambulate with a normalized gait without pain. Status at last Eval: See chart Current Status:  Gross LE MMT 3+/5 Not Met    3.  Patient will report less than 3/10 pain to demonstrate an improvement in quality of life   Status at last Eval: 5/10 Current Status:  5/10 Not Met                     Long term goals (to be completed in 8 weeks) Goal Status   1.  Patient will report the ability to stand for > 1 hour without the onset of pain to allow patient to return to working.    Status at last Eval: Unable, 10-15 minute limit Current Status:  10-15 minutes Not Met    2.  Patient will demonstrate 4+/5 B hip extensor MMT to be able to better support pelvis during quiet standing. Status at last Eval: See chart Current Status:  Gross LE MMT 3+/5  Not Met   3.  Patient will demonstrate an Oswestry Disability Index score of 10% or less to demonstrate reduced lumbar disability impacting patient's daily hobbies, work duties, and daily chores. Status at last Eval:   Current Status:  78% Not Met      Key Functional Changes/Progress:   Per Pt, he believes he has not made progress with PT and continues to complain of symptoms of similar severity, sometimes greater severity than when he first began PT. Pt presents with continued decreased lumbar ROM, decreased strength, increased pain, and decreased ability to ambulate with normalized gait pattern. Pt demonstrates appropriateness for discharge at this time as Pt has yet to progress with PT goals. Pt agrees with PT decision to discharge at this time. Assessments/Recommendations: Discontinue therapy due to lack of appreciable progress towards goals. If you have any questions/comments please contact us directly at [phone]. Thank you for allowing us to assist in the care of your patient. Therapist Signature:  Ting Lorenzo Liya Bourgeois DPT Date: 3/23/2021   Reporting Period: 02/18/2021 - 03/23/2021 Time: 3:83 PM      Certification Period: 02/18/2021 - 04/15/2021       NOTE TO PHYSICIAN:  PLEASE COMPLETE THE ORDERS BELOW AND FAX TO   [facility]: [phone]. If you are unable to process this request in 24 hours please contact our office: [phone].    ___ I have read the above report and request that my patient be discharged from therapy.      Physician Signature:        Date:       Time:

## 2021-03-24 NOTE — PROGRESS NOTES
[facility]  [address], [place]   Phone: [phone]  Fax: [phone]        PROGRESS NOTE      HISTORY OF PRESENT ILLNESS:  The patient is a 38 y.o. male and was seen today for follow up of right-sided lower back and buttocks pain radiating into the anterior right thigh (right buttocks pain >>). Previously, he was seen for low back and right groin pain radiating anteromedially into the RLE to the knee. Previously, he was seen for low back pain radiating into the RLE in an L5-S1 distribution to the knee. Additionally, he endorses paraesthesias in the inferior aspect of his bilateral feet. Initially had c/o low back pain radiating into the BLE in and L4 distribution to the knees since work injury in 2007, worse x 3 weeks. Prior to the acute exacerbation, he reports his chronic baseline level of pain 4/10. Pt reports immediate onset of pain following injury. Originally through Workers comp but they have since settled. His groin pain is increased by activities such as putting on socks and shoes and getting into and out of a car. Pt had an increase in right groin pain radiating anteromedially into the RLE to the knee. Pt treats with Toradol, with relief. Pt denies h/o spinal surgery. Pt reports he had spinal injections x 2017+. Pt underwent right SI joint injection on 8/22/19 with no relief. He previously did not tolerate NEURONTIN secondary to sedation. He did not tolerate Cymbalta secondary to hallucinations. Patient reports an episode of bladder incontinence x 7/2019. He later clarified he could not make it to the bathroom in time. Pt denies fever, weight loss, or skin changes. The patient is RHD. Note from Katlin Contreras NP dated 12/20/08 indicating patient was seen with c/o lower back pain. Indicated he had improvement with PT. Note from Dr. Ellis Suarez 5/18/16 indicating patient was seen with c/o back and leg pain.  Indicated he was seen for a second opinion for his back and leg pain. Previously followed [facility]. Original work injury 7/27/08. Indicated he had blocks x2 in 2013, x1 in 2014, and x1 in 2015. Indicated he had relief with those injections. Note from Dr. Beverly Boogie 8/16/16 indicating patient was seen with c/o low back and leg pain. Indicated his MRI showed evidence of left sided lumbar radiculopathy. Pt underwent left sided SNRB on 4/9/15 with Dr. Breezy Castañeda. Pt underwent right sided SNRB on 8/5/14 with Dr. Breezy Castañeda. Pt underwent left sided L4 SNRB on 12/12/13 with Dr. Breezy Castañeda. Pt underwent left sided L4 SNRB on 5/16/13 with Dr. Arron Navarrete note from Dr. Tracy Dupont patient presented for back pain radiating into the BLE (L>R), which started 1 day prior. Indicated he previously was on Neurontin, but noted he ran out x 1 year ago. He previously treated with oxycodone and acetaminophen without relief. Restarted his Neurontin at 300 mg qhs, provided him a Rx for prednisone, and treated him with a Toradol injection. Note from Dr. Marva Naranjo 10/15/2020 indicating patient was seen with c/o right hip, low back and groin pain. Pt has h/o steroid use. Pt has pain putting on shoes and socks. Mild to moderate arthritis. Ordered an MRI of the hip. Note from Dr. Jose Phillips dated 1/21/2021 indicating patient was seen with c/o right-sided pain. Pt has trouble sitting. Endorses mild groin discomfort. MRI of the right hip indicated mild to moderate arthritis and fraying of the labrum. No surgery on the hip at this time. Recommended PT and hip injection. Pt underwent bursa injection. Preliminary reading of lumbar plain films: Mild disc space narrowing at L4 on L5 and L5 on S1. Small anterior osteophytes on L4- L5. No acute pathology identified. Lumbar spine MRI dated 7/10/19 reviewed. Per report, degenerative changes in the 3 lower most lumbar levels as describes with no spinal stenosis.  RLE EMG dated 8/19/2020 by Dr. Alberto Rincon was normal. Preliminary reading of pelvic plain films dated 10/14/2020 revealed: No acute pathology identified. Degenerative changes on the right relative to the left hip joint. RT Hip MRI dated 11/23/2020 films not independently reviewed. Per report, no evidence of fracture, stress fracture, or avascular necrosis involving the right hip joint. Mild-moderate right hip joint chondrosis with small foci of subchondral marrow edema. No significant joint effusion or synovitis. Mild, likely degenerative fraying anterosuperior acetabular labrum. At his last clinical appointment, I offered to increase his Lyrica, pt declined. I provided him refills of Lyrica 150 mg BID. I referred him to physical therapy with an emphasis on HEP.        The patient returns today and reports pain location and distribution remains unchanged. He rates his pain 5/10, previously 3/10. Pt reports diffuse widespread spinal pain. Therapy notes reviewed and indicated pt had no relief with PT. He is compliant with his HEP. He is compliant with the Lyrica 150 mg BID. Pt denies change in bowel or bladder habits.  reviewed. Body mass index is 29.04 kg/m².     PCP: Madi Tejeda NP      Past Medical History:   Diagnosis Date    Arthritis     Hypertension         Social History     Socioeconomic History    Marital status:      Spouse name: Not on file    Number of children: Not on file    Years of education: Not on file    Highest education level: Not on file   Occupational History    Not on file   Social Needs    Financial resource strain: Not on file    Food insecurity     Worry: Not on file     Inability: Not on file    Transportation needs     Medical: Not on file     Non-medical: Not on file   Tobacco Use    Smoking status: Former Smoker    Smokeless tobacco: Never Used   Substance and Sexual Activity    Alcohol use: Not Currently    Drug use: Not Currently    Sexual activity: Not on file   Lifestyle    Physical activity     Days per week: Not on file     Minutes per session: Not on file    Stress: Not on file   Relationships    Social connections     Talks on phone: Not on file     Gets together: Not on file     Attends Sabianism service: Not on file     Active member of club or organization: Not on file     Attends meetings of clubs or organizations: Not on file     Relationship status: Not on file    Intimate partner violence     Fear of current or ex partner: Not on file     Emotionally abused: Not on file     Physically abused: Not on file     Forced sexual activity: Not on file   Other Topics Concern     Service Not Asked    Blood Transfusions Not Asked    Caffeine Concern Not Asked    Occupational Exposure Not Asked   Shalonda Heredia Hazards Not Asked    Sleep Concern Not Asked    Stress Concern Not Asked    Weight Concern Not Asked    Special Diet Not Asked    Back Care Not Asked    Exercise Not Asked    Bike Helmet Not Asked   [address] Not Asked    Self-Exams Not Asked   Social History Narrative    Not on file       Current Outpatient Medications   Medication Sig Dispense Refill    pregabalin (Lyrica) 150 mg capsule Take 1 Cap by mouth two (2) times a day. Max Daily Amount: 300 mg. 60 Cap 2    pregabalin (Lyrica) 150 mg capsule Take 1 Cap by mouth two (2) times a day. Max Daily Amount: 300 mg. 60 Cap 1    MEN'S MULTI-VITAMIN PO Take  by mouth.  pregabalin (Lyrica) 75 mg capsule Take 1 Cap by mouth two (2) times a day. Max Daily Amount: 150 mg. (Patient not taking: Reported on 3/26/2021) 60 Cap 0    gabapentin (NEURONTIN) 300 mg capsule Take 1 Cap by mouth three (3) times daily. Max Daily Amount: 900 mg. (Patient not taking: Reported on 12/9/2020) 90 Cap 1    ketorolac (TORADOL) 10 mg tablet Take  by mouth.          Allergies   Allergen Reactions    Banana Hives          PHYSICAL EXAMINATION    Visit Vitals  /74 (BP 1 Location: Left arm, BP Patient Position: Sitting)   Pulse 64 Temp 97.3 °F (36.3 °C) (Skin)   Wt 208 lb 3.2 oz (94.4 kg)   SpO2 97%   BMI 29.04 kg/m²       CONSTITUTIONAL: NAD, A&O x 3  SENSATION: Intact to light touch throughout  RANGE OF MOTION: The patient has full passive range of motion in all four extremities. MOTOR:  Straight Leg Raise: Negative, bilateral    Ambulates with a single point cane. Hip Flex Knee Ext Knee Flex Ankle DF GTE Ankle PF Tone   Right +4/5 +4/5 +4/5 +4/5 +4/5 +4/5 +4/5   Left +4/5 +4/5 +4/5 +4/5 +4/5 +4/5 +4/5       ASSESSMENT   Diagnoses and all orders for this visit:    1. Hip pain  -     SCHEDULE SURGERY  -     pregabalin (LYRICA) 225 mg capsule; Take 1 Cap by mouth two (2) times a day. Max Daily Amount: 450 mg.    2. Low back pain at multiple sites  -     SCHEDULE SURGERY  -     pregabalin (LYRICA) 225 mg capsule; Take 1 Cap by mouth two (2) times a day. Max Daily Amount: 450 mg.    3. Lumbosacral spondylosis without myelopathy  -     SCHEDULE SURGERY  -     pregabalin (LYRICA) 225 mg capsule; Take 1 Cap by mouth two (2) times a day. Max Daily Amount: 450 mg.    4. Lumbar neuritis  -     SCHEDULE SURGERY  -     pregabalin (LYRICA) 225 mg capsule; Take 1 Cap by mouth two (2) times a day. Max Daily Amount: 450 mg.    5. DDD (degenerative disc disease), lumbar  -     SCHEDULE SURGERY  -     pregabalin (LYRICA) 225 mg capsule; Take 1 Cap by mouth two (2) times a day. Max Daily Amount: 450 mg.    6. Osteoarthritis of right hip, unspecified osteoarthritis type  -     SCHEDULE SURGERY  -     pregabalin (LYRICA) 225 mg capsule; Take 1 Cap by mouth two (2) times a day. Max Daily Amount: 450 mg.      IMPRESSION AND PLAN:  Patient returns to the office today with c/o right-sided lower back and buttocks pain radiating into the anterior right thigh. Multiple treatment options were discussed. I will increase his Lyrica from 150 mg BID to 225 mg BID. Patient advised to call the office if intolerant to higher dose.  Pt elected to proceed with block. I will order an epidural L4-5. I encouraged him to continue to perform his daily HEP. Patient is neurologically intact. I will see the patient back following the block or earlier if needed. Written by Iram John, as dictated by Erlin Coon MD  I examined the patient, reviewed and agree with the note.

## 2021-03-25 ENCOUNTER — APPOINTMENT (OUTPATIENT)
Dept: PHYSICAL THERAPY | Age: 41
End: 2021-03-25
Payer: COMMERCIAL

## 2021-03-26 ENCOUNTER — OFFICE VISIT (OUTPATIENT)
Dept: ORTHOPEDIC SURGERY | Age: 41
End: 2021-03-26
Payer: COMMERCIAL

## 2021-03-26 VITALS
DIASTOLIC BLOOD PRESSURE: 74 MMHG | SYSTOLIC BLOOD PRESSURE: 123 MMHG | OXYGEN SATURATION: 97 % | WEIGHT: 208.2 LBS | BODY MASS INDEX: 29.04 KG/M2 | HEART RATE: 64 BPM | TEMPERATURE: 97.3 F

## 2021-03-26 DIAGNOSIS — M51.36 DDD (DEGENERATIVE DISC DISEASE), LUMBAR: ICD-10-CM

## 2021-03-26 DIAGNOSIS — M54.16 LUMBAR NEURITIS: ICD-10-CM

## 2021-03-26 DIAGNOSIS — M54.50 LOW BACK PAIN AT MULTIPLE SITES: ICD-10-CM

## 2021-03-26 DIAGNOSIS — M16.11 OSTEOARTHRITIS OF RIGHT HIP, UNSPECIFIED OSTEOARTHRITIS TYPE: ICD-10-CM

## 2021-03-26 DIAGNOSIS — M47.817 LUMBOSACRAL SPONDYLOSIS WITHOUT MYELOPATHY: ICD-10-CM

## 2021-03-26 DIAGNOSIS — M25.559 HIP PAIN: Primary | ICD-10-CM

## 2021-03-26 PROCEDURE — 99214 OFFICE O/P EST MOD 30 MIN: CPT | Performed by: PHYSICAL MEDICINE & REHABILITATION

## 2021-03-26 RX ORDER — PREGABALIN 225 MG/1
225 CAPSULE ORAL 2 TIMES DAILY
Qty: 60 CAP | Refills: 1 | Status: SHIPPED | OUTPATIENT
Start: 2021-03-26 | End: 2021-11-09 | Stop reason: ALTCHOICE

## 2021-03-26 NOTE — LETTER
3/26/2021 Patient: Cora Avina. YOB: 1980 Date of Visit: 3/26/2021 Anna Hicks NP 
[address] [facility] [address] [address] Fax: [phone] Dear Anna Hicks NP, Thank you for referring Mr. Uvaldo Mendez to [address] for evaluation. My notes for this consultation are attached. If you have questions, please do not hesitate to call me. I look forward to following your patient along with you. Sincerely, Epifanio Zambrano MD

## 2021-05-13 ENCOUNTER — HOSPITAL ENCOUNTER (OUTPATIENT)
Age: 41
Setting detail: OUTPATIENT SURGERY
Discharge: HOME | End: 2021-05-13
Attending: PHYSICAL MEDICINE & REHABILITATION | Admitting: PHYSICAL MEDICINE & REHABILITATION
Payer: COMMERCIAL

## 2021-05-13 ENCOUNTER — APPOINTMENT (OUTPATIENT)
Dept: GENERAL RADIOLOGY | Age: 41
End: 2021-05-13
Attending: PHYSICAL MEDICINE & REHABILITATION
Payer: COMMERCIAL

## 2021-05-13 VITALS
RESPIRATION RATE: 20 BRPM | HEART RATE: 65 BPM | TEMPERATURE: 98.2 F | DIASTOLIC BLOOD PRESSURE: 76 MMHG | SYSTOLIC BLOOD PRESSURE: 114 MMHG | OXYGEN SATURATION: 97 %

## 2021-05-13 PROCEDURE — 2709999900 HC NON-CHARGEABLE SUPPLY: Performed by: PHYSICAL MEDICINE & REHABILITATION

## 2021-05-13 PROCEDURE — 74011000250 HC RX REV CODE- 250: Performed by: PHYSICAL MEDICINE & REHABILITATION

## 2021-05-13 PROCEDURE — 74011250637 HC RX REV CODE- 250/637: Performed by: PHYSICAL MEDICINE & REHABILITATION

## 2021-05-13 PROCEDURE — 62323 NJX INTERLAMINAR LMBR/SAC: CPT | Performed by: PHYSICAL MEDICINE & REHABILITATION

## 2021-05-13 PROCEDURE — 76010000009 HC PAIN MGT 0 TO 30 MIN PROC: Performed by: PHYSICAL MEDICINE & REHABILITATION

## 2021-05-13 PROCEDURE — 74011250636 HC RX REV CODE- 250/636: Performed by: PHYSICAL MEDICINE & REHABILITATION

## 2021-05-13 PROCEDURE — 77030014124 HC TY EPDRL BBMI -A: Performed by: PHYSICAL MEDICINE & REHABILITATION

## 2021-05-13 RX ORDER — LIDOCAINE HYDROCHLORIDE 10 MG/ML
INJECTION, SOLUTION EPIDURAL; INFILTRATION; INTRACAUDAL; PERINEURAL AS NEEDED
Status: DISCONTINUED | OUTPATIENT
Start: 2021-05-13 | End: 2021-05-13 | Stop reason: HOSPADM

## 2021-05-13 RX ORDER — DIAZEPAM 5 MG/1
5-20 TABLET ORAL ONCE
Status: COMPLETED | OUTPATIENT
Start: 2021-05-13 | End: 2021-05-13

## 2021-05-13 RX ORDER — DEXAMETHASONE SODIUM PHOSPHATE 100 MG/10ML
INJECTION INTRAMUSCULAR; INTRAVENOUS AS NEEDED
Status: DISCONTINUED | OUTPATIENT
Start: 2021-05-13 | End: 2021-05-13 | Stop reason: HOSPADM

## 2021-05-13 RX ADMIN — DIAZEPAM 10 MG: 5 TABLET ORAL at 13:17

## 2021-05-13 NOTE — DISCHARGE INSTRUCTIONS
[facility]   (NESS)  Dr. Eber Yoo, Dr. Marques Antunez, Dr. León Mera Spinal Procedure (Block) Instructions    * Do not drive a car, operate heavy machinery or dangerous equipment, or make important decisions for 12-24 hours. * Light activity as tolerated; may rest for the remainder of the day. * Resume pre-block medications including those from your other doctors. * Do not drink alcoholic beverages for 24 hours. Alcohol and the medications you have received may interact and cause an adverse reaction. * You may feel better this evening and worse tomorrow, as the numbing medications wears off and the steroid has yet to begin to work. After 48-72 hrs the steroid should begin to release bringing you relief. If you had a medial branch block, no steroids were used. The medial branch block is a test to see if you are a candidate for radiofrequency ablation (RFA). The anesthetic (numbing medicine)  will wear off by the next day. * You may shower this evening and remove any bandages. * Avoid hot tubs/pools/tub soaks and heating pads for 24 hours. You may use cold packs on the procedure site as tolerated for the first 24 hours. * If a headache develops, drink plenty of fluids and rest.  Take over the counter medications for headache if needed. If the headache continues longer than 24 hours, call MD at the [address] [place] [address] Avenue. [phone]    * Continue taking pain medications as needed. * You may resume your regular diet if tolerated. Otherwise, start with sips of water and advance slowly. * If Diabetic: check your blood sugar three times a day for the next 3 days. If your sugar is greater than 300 call your family doctor. If your sugar is greater than 400, have someone transport you to the nearest Emergency Room. * If you experience any of the following problems, Please Call the [address] [place] [address] Avenue at [phone].         * Excessive pain, swelling, redness or odor at or around the surgical area    * Fever of 101 or higher    * Nausea / Vomiting lasting longer than 4 hours or if unable to take medications. * Severe Headache    * Weakness or numbness in arms or legs that is not      resolving   * Any NEW signs of decreased circulation or nerve impairment in leg: change in color, swelling, persistent numbness, tingling                    * Prolonged increase in pain greater than 4 days      PATIENT INSTRUCTIONS:    After oral sedation, for 12-24 hours or while taking prescription Narcotics:  · Limit your activities  · Do not drive and operate hazardous machinery  · Do not make important personal or business decisions  · Do  not drink alcoholic beverages  · If you have not urinated within 8 hours after discharge, please contact your surgeon on call. *  Please give a list of your current medications to your Primary Care Provider. *  Please update this list whenever your medications are discontinued, doses are      changed, or new medications (including over-the-counter products) are added. *  Please carry medication information at all times in case of emergency situations. These are general instructions for a healthy lifestyle:    No smoking/ No tobacco products/ Avoid exposure to second hand smoke    Surgeon General's Warning:  Quitting smoking now greatly reduces serious risk to your health. Obesity, smoking, and sedentary lifestyle greatly increases your risk for illness    A healthy diet, regular physical exercise & weight monitoring are important for maintaining a healthy lifestyle    You may be retaining fluid if you have a history of heart failure or if you experience any of the following symptoms:  Weight gain of 3 pounds or more overnight or 5 pounds in a week, increased swelling in our hands or feet or shortness of breath while lying flat in bed.   Please call your doctor as soon as you notice any of these symptoms; do not wait until your next office visit. Recognize signs and symptoms of STROKE:    F-face looks uneven    A-arms unable to move or move unevenly    S-speech slurred or non-existent    T-time-call 911 as soon as signs and symptoms begin-DO NOT go       Back to bed or wait to see if you get better-TIME IS BRAIN.

## 2021-05-13 NOTE — PROCEDURES
Intralaminar Epidural Steroid Block Procedure Note      Patient Name: Lucho Mccartney. Date of Procedure: May 13, 2021    Preoperative Diagnosis: Lumbar radiculopathy [M54.16]  Lumbar pain [M54.5]    Post Operative Diagnosis: Lumbar radiculopathy [M54.16]  Lumbar pain [M54.5]    Procedure: L4-L5 Epidural Block     PROCEDURE:  Lumbar Epidural Block    Consent:  Informed  Consent was obtained prior to the procedure. The patient was given the opportunity to ask questions regarding the procedure and its associated risks. In addition to the potential risks associated with the procedure itself, the patient was informed both verbally and in writing of potential side effects of the use glucocorticoids. The patient appeared to comprehend the informed consent and desired to have the procedure performed. The patient was placed in the prone position on the fluoroscopy table and the back prepped and draped in the usual sterile manner. The interlaminar space was identified using fluoroscopy and the skin anesthetized with 1% Lidocaine. A #18 Tuohy Epidural needle was advanced under fluoroscopic control from a paramedian approach to the  epidural space as noted above, using the loss of resistance to fluid technique. Then, 10 mg of preservative free Dexamethasone and 2 cc of Lidocaine was slowly injected. The patient tolerated the procedure well. The injection area was cleaned and band aids applied. No excessive bleeding was noted. Patient dressed and was discharged to home with instructions.

## 2021-06-14 DIAGNOSIS — M16.11 OSTEOARTHRITIS OF RIGHT HIP, UNSPECIFIED OSTEOARTHRITIS TYPE: ICD-10-CM

## 2021-06-14 DIAGNOSIS — M25.559 HIP PAIN: ICD-10-CM

## 2021-06-14 DIAGNOSIS — M47.817 LUMBOSACRAL SPONDYLOSIS WITHOUT MYELOPATHY: ICD-10-CM

## 2021-06-14 DIAGNOSIS — M54.50 LOW BACK PAIN AT MULTIPLE SITES: ICD-10-CM

## 2021-06-14 DIAGNOSIS — M51.36 DDD (DEGENERATIVE DISC DISEASE), LUMBAR: ICD-10-CM

## 2021-06-14 DIAGNOSIS — M54.16 LUMBAR NEURITIS: ICD-10-CM

## 2021-06-14 RX ORDER — PREGABALIN 225 MG/1
CAPSULE ORAL
Qty: 60 CAPSULE | Refills: 0 | OUTPATIENT
Start: 2021-06-14

## 2021-06-21 NOTE — TELEPHONE ENCOUNTER
Called patient to schedule an appointment. VV is scheduled for today for a block follow up as well as medication refill.

## 2021-06-22 DIAGNOSIS — M54.16 LUMBAR NEURITIS: ICD-10-CM

## 2021-06-22 DIAGNOSIS — M25.559 HIP PAIN: ICD-10-CM

## 2021-06-22 DIAGNOSIS — M51.36 DDD (DEGENERATIVE DISC DISEASE), LUMBAR: ICD-10-CM

## 2021-06-22 DIAGNOSIS — M47.817 LUMBOSACRAL SPONDYLOSIS WITHOUT MYELOPATHY: ICD-10-CM

## 2021-06-22 DIAGNOSIS — M16.11 OSTEOARTHRITIS OF RIGHT HIP, UNSPECIFIED OSTEOARTHRITIS TYPE: ICD-10-CM

## 2021-06-22 DIAGNOSIS — M54.50 LOW BACK PAIN AT MULTIPLE SITES: ICD-10-CM

## 2021-06-23 RX ORDER — PREGABALIN 225 MG/1
CAPSULE ORAL
Qty: 60 CAPSULE | Refills: 0 | OUTPATIENT
Start: 2021-06-23

## 2021-06-28 ENCOUNTER — VIRTUAL VISIT (OUTPATIENT)
Dept: ORTHOPEDIC SURGERY | Age: 41
End: 2021-06-28
Payer: COMMERCIAL

## 2021-06-28 DIAGNOSIS — M54.16 LUMBAR NEURITIS: ICD-10-CM

## 2021-06-28 DIAGNOSIS — M25.559 HIP PAIN: Primary | ICD-10-CM

## 2021-06-28 DIAGNOSIS — M47.817 LUMBOSACRAL SPONDYLOSIS WITHOUT MYELOPATHY: ICD-10-CM

## 2021-06-28 DIAGNOSIS — M54.50 LOW BACK PAIN AT MULTIPLE SITES: ICD-10-CM

## 2021-06-28 DIAGNOSIS — M51.36 DDD (DEGENERATIVE DISC DISEASE), LUMBAR: ICD-10-CM

## 2021-06-28 DIAGNOSIS — M16.11 OSTEOARTHRITIS OF RIGHT HIP, UNSPECIFIED OSTEOARTHRITIS TYPE: ICD-10-CM

## 2021-06-28 PROCEDURE — 99442 PR PHYS/QHP TELEPHONE EVALUATION 11-20 MIN: CPT | Performed by: PHYSICAL MEDICINE & REHABILITATION

## 2021-06-28 RX ORDER — PREGABALIN 225 MG/1
225 CAPSULE ORAL 2 TIMES DAILY
Qty: 180 CAPSULE | Refills: 0 | Status: SHIPPED | OUTPATIENT
Start: 2021-06-28 | End: 2021-09-20 | Stop reason: SDUPTHER

## 2021-06-28 NOTE — PROGRESS NOTES
[facility]  [address]  Phone: [phone]  Fax: [phone]        PROGRESS NOTE    CONSENT:  Pursuant to the emergency declaration under the [employer] and [facility], 1135 waiver authority and the [email or web address] and [employer] Act, this Virtual Visit was conducted, with patient's consent, to reduce the patient's risk of exposure to COVID-19 and provide continuity of care for an established patient. Services were unable to be provided through a video synchronous discussion virtually to substitute for in-person appointment. Subsequently, the patient was consulted through a telephone discussion. ENCOUNTER DURATION (minutes): 11 minutes 24 seconds      HISTORY OF PRESENT ILLNESS:  The patient is a 38 y.o. male. Mr. Ayanna Araya is being consulted via telephone from home by me at the [facility] office for follow up of right-sided lower back and buttocks pain radiating into the anterior right thigh (right buttocks pain >>). Previously, he was seen for low back and right groin pain radiating anteromedially into the RLE to the knee. Previously, he was seen for low back pain radiating into the RLE in an L5-S1 distribution to the knee. Additionally, he endorses paraesthesias in the inferior aspect of his bilateral feet. Initially had c/o low back pain radiating into the BLE in and L4 distribution to the knees since work injury in 2007, worse x 3 weeks. Prior to the acute exacerbation, he reports his chronic baseline level of pain 4/10. Pt reports immediate onset of pain following injury. Originally through Workers comp but they have since settled. His groin pain is increased by activities such as putting on socks and shoes and getting into and out of a car.  Pt had an increase in right groin pain radiating anteromedially into the RLE to the knee. Pt treats with Toradol, with relief. Pt denies h/o spinal surgery. Pt reports he had spinal injections x 2017+. Pt underwent right SI joint injection on 8/22/19 with no relief. He previously did not tolerate NEURONTIN secondary to sedation. He did not tolerate Cymbalta secondary to hallucinations. Patient reports an episode of bladder incontinence x 7/2019. He later clarified he could not make it to the bathroom in time. Pt denies fever, weight loss, or skin changes. The patient is RHD. Note from Katlin Contreras NP dated 12/20/08 indicating patient was seen with c/o lower back pain. Indicated he had improvement with PT. Note from Dr. Chantell Johnson 5/18/16 indicating patient was seen with c/o back and leg pain. Indicated he was seen for a second opinion for his back and leg pain. Previously followed [facility]. Original work injury 7/27/08. Indicated he had blocks x2 in 2013, x1 in 2014, and x1 in 2015. Indicated he had relief with those injections. Note from Dr. Chantell Johnson 8/16/16 indicating patient was seen with c/o low back and leg pain. Indicated his MRI showed evidence of left sided lumbar radiculopathy. Pt underwent left sided SNRB on 4/9/15 with Dr. James Seay. Pt underwent right sided SNRB on 8/5/14 with Dr. James Seay. Pt underwent left sided L4 SNRB on 12/12/13 with Dr. James Seay. Pt underwent left sided L4 SNRB on 5/16/13 with Dr. Nguyen Davies note from Dr. Lisa Chavira patient presented for back pain radiating into the BLE (L>R), which started 1 day prior. Indicated he previously was on Neurontin, but noted he ran out x 1 year ago. He previously treated with oxycodone and acetaminophen without relief. Restarted his Neurontin at 300 mg qhs, provided him a Rx for prednisone, and treated him with a Toradol injection. Note from Dr. Jordyn Travis 10/15/2020 indicating patient was seen with c/o right hip, low back and groin pain. Pt has h/o steroid use. Pt has pain putting on shoes and socks. Mild to moderate arthritis.  Ordered an MRI of the hip. Note from Dr. Osmany Rao 1/21/2021 indicating patient was seen with c/o right-sided pain. Pt has trouble sitting. Endorses mild groin discomfort. MRI of the right hip indicated mild to moderate arthritis and fraying of the labrum. No surgery on the hip at this time. Recommended PT and hip injection. Pt underwent bursa injection. Preliminary reading of lumbar plain films: Mild disc space narrowing at L4 on L5 and L5 on S1. Small anterior osteophytes on L4- L5. No acute pathology identified. Lumbar spine MRI dated 7/10/19 reviewed. Per report, degenerative changes in the 3 lower most lumbar levels as describes with no spinal stenosis. RLE EMG dated 8/19/2020 by Dr. Jailyn Gunter was normal. Preliminary reading of pelvic plain films dated 10/14/2020 revealed: No acute pathology identified. Degenerative changes on the right relative to the left hip joint. RT Hip MRI dated 11/23/2020 films not independently reviewed. Per report, no evidence of fracture, stress fracture, or avascular necrosis involving the right hip joint. Mild-moderate right hip joint chondrosis with small foci of subchondral marrow edema. No significant joint effusion or synovitis. Mild, likely degenerative fraying anterosuperior acetabular labrum. At his last clinical appointment, I increased his Lyrica from 150 mg BID to 225 mg BID. Pt elected to proceed with block. I ordered an epidural L4-5. I encouraged him to continue to perform his daily HEP. At today's telephone consultation, the patient reports pain location and distribution remains unchanged. He rates his pain 4/10, previously 5/10. Pt underwent epidural L4-5 on 5/31/2021 with benefit. Pt failed to f/u after the block. He is tolerating the increased dose of Lyrica 225 mg BID with benefit. He is compliant with his HEP. Pt denies change in bowel or bladder habits. Pt denies any signs of weakness.  reviewed. There is no height or weight on file to calculate BMI.     PCP: Lino Zuleta NP      Past Medical History:   Diagnosis Date    Arthritis     Hypertension         Social History     Socioeconomic History    Marital status:      Spouse name: Not on file    Number of children: Not on file    Years of education: Not on file    Highest education level: Not on file   Occupational History    Not on file   Tobacco Use    Smoking status: Former Smoker    Smokeless tobacco: Never Used   Substance and Sexual Activity    Alcohol use: Not Currently    Drug use: Not Currently    Sexual activity: Not on file   Other Topics Concern   [address] Service Not Asked    Blood Transfusions Not Asked    Caffeine Concern Not Asked    Occupational Exposure Not Asked    Hobby Hazards Not Asked    Sleep Concern Not Asked    Stress Concern Not Asked    Weight Concern Not Asked    Special Diet Not Asked    Back Care Not Asked    Exercise Not Asked    Bike Helmet Not Asked   [address] Not Asked    Self-Exams Not Asked   Social History Narrative    Not on file     Social Determinants of Health     Financial Resource Strain:     Difficulty of Paying Living Expenses:    Food Insecurity:     Worried About Running Out of Food in the Last Year:     Ran Out of Food in the Last Year:    Transportation Needs:     Lack of Transportation (Medical):      Lack of Transportation (Non-Medical):    Physical Activity:     Days of Exercise per Week:     Minutes of Exercise per Session:    Stress:     Feeling of Stress :    Social Connections:     Frequency of Communication with Friends and Family:     Frequency of Social Gatherings with Friends and Family:     Attends Zoroastrian Services:     Active Member of Clubs or Organizations:     Attends Club or Organization Meetings:     Marital Status:    Intimate Partner Violence:     Fear of Current or Ex-Partner:     Emotionally Abused:     Physically Abused:     Sexually Abused:        Current Outpatient Medications Medication Sig Dispense Refill    pregabalin (LYRICA) 225 mg capsule Take 1 Cap by mouth two (2) times a day. Max Daily Amount: 450 mg. 60 Cap 1    MEN'S MULTI-VITAMIN PO Take  by mouth.  pregabalin (Lyrica) 150 mg capsule Take 1 Cap by mouth two (2) times a day. Max Daily Amount: 300 mg. (Patient not taking: Reported on 6/28/2021) 60 Cap 2    pregabalin (Lyrica) 150 mg capsule Take 1 Cap by mouth two (2) times a day. Max Daily Amount: 300 mg. (Patient not taking: Reported on 6/28/2021) 60 Cap 1    pregabalin (Lyrica) 75 mg capsule Take 1 Cap by mouth two (2) times a day. Max Daily Amount: 150 mg. (Patient not taking: Reported on 3/26/2021) 60 Cap 0    gabapentin (NEURONTIN) 300 mg capsule Take 1 Cap by mouth three (3) times daily. Max Daily Amount: 900 mg. (Patient not taking: Reported on 12/9/2020) 90 Cap 1    ketorolac (TORADOL) 10 mg tablet Take  by mouth. (Patient not taking: Reported on 6/28/2021)         Allergies   Allergen Reactions    Banana Hives          PHYSICAL EXAMINATION  Unable to perform examination secondary to COVID-19. CONSTITUTIONAL: NAD, A&O x 3    ASSESSMENT   Diagnoses and all orders for this visit:    1. Hip pain    2. Low back pain at multiple sites    3. Lumbosacral spondylosis without myelopathy    4. Lumbar neuritis    5. DDD (degenerative disc disease), lumbar    6. Osteoarthritis of right hip, unspecified osteoarthritis type    Other orders  -     pregabalin (LYRICA) 225 mg capsule; Take 1 Capsule by mouth two (2) times a day. Max Daily Amount: 450 mg.        IMPRESSION AND PLAN:  The patient consented to the tele health visit and was aware that there would be a charge. During today's telephone consultation patient had c/o right-sided lower back and buttocks pain radiating into the anterior right thigh (right buttocks pain >>). Multiple treatment options were discussed. Patient wished to continue his current treatment. I provided him refills of Lyrica 225 mg BID.  Pt appears to be neurologically intact. I will see the patient back in 3 month's time or earlier if needed. Written by Larry Goltz, as dictated by Melisa Rowan MD  I examined the patient, reviewed and agree with the note.

## 2021-09-20 DIAGNOSIS — M51.36 DDD (DEGENERATIVE DISC DISEASE), LUMBAR: ICD-10-CM

## 2021-09-20 DIAGNOSIS — M54.50 LOW BACK PAIN AT MULTIPLE SITES: ICD-10-CM

## 2021-09-20 DIAGNOSIS — M25.559 HIP PAIN: ICD-10-CM

## 2021-09-20 DIAGNOSIS — M54.16 LUMBAR NEURITIS: ICD-10-CM

## 2021-09-20 DIAGNOSIS — M47.817 LUMBOSACRAL SPONDYLOSIS WITHOUT MYELOPATHY: ICD-10-CM

## 2021-09-20 DIAGNOSIS — M16.11 OSTEOARTHRITIS OF RIGHT HIP, UNSPECIFIED OSTEOARTHRITIS TYPE: ICD-10-CM

## 2021-09-20 RX ORDER — PREGABALIN 225 MG/1
CAPSULE ORAL
Qty: 180 CAPSULE | Refills: 0 | OUTPATIENT
Start: 2021-09-20

## 2021-09-20 RX ORDER — PREGABALIN 225 MG/1
225 CAPSULE ORAL 2 TIMES DAILY
Qty: 60 CAPSULE | Refills: 0 | Status: SHIPPED | OUTPATIENT
Start: 2021-09-20 | End: 2021-11-09 | Stop reason: ALTCHOICE

## 2021-09-20 NOTE — TELEPHONE ENCOUNTER
Patients last visit was virtual and he probably was not called to schedule after the visit was over.

## 2021-09-20 NOTE — TELEPHONE ENCOUNTER
Last Visit: 6/28/21 with MD Breann Craig  Next Appointment: Kelli Mitchell to follow-up in 3 months  Previous Refill Encounter(s): 6/28/21 #180    Requested Prescriptions     Pending Prescriptions Disp Refills    pregabalin (LYRICA) 225 mg capsule 180 Capsule 0     Sig: Take 1 Capsule by mouth two (2) times a day. Max Daily Amount: 450 mg.

## 2021-09-24 NOTE — TELEPHONE ENCOUNTER
Called patient [phone] and verified his name and date of birth of birth. I informed him that a 30 day supply has been sent to the pharmacy and he would need a follow up appointment for any additional refills. I informed him that he can make a virtual appointment or in office visit. No further action needed at this time.

## 2021-10-11 NOTE — PROGRESS NOTES
[facility]  [address], [address]  Phone: [phone]  Fax: [phone]        PROGRESS NOTE      HISTORY OF PRESENT ILLNESS:  The patient is a 42 y.o. male and was seen today for follow up of right-sided lower back and buttocks pain radiating into the anterior right thigh (right buttocks pain >>). Previously, he was seen for low back and right groin pain radiating anteromedially into the RLE to the knee. Previously, he was seen for low back pain radiating into the RLE in an L5-S1 distribution to the knee. Additionally, he endorses paraesthesias in the inferior aspect of his bilateral feet. Initially had c/o low back pain radiating into the BLE in and L4 distribution to the knees since work injury in 2007, worse x 3 weeks. Prior to the acute exacerbation, he reports his chronic baseline level of pain 4/10. Pt reports immediate onset of pain following injury. Originally through Workers comp but they have since settled. His groin pain is increased by activities such as putting on socks and shoes and getting into and out of a car. Pt had an increase in right groin pain radiating anteromedially into the RLE to the knee. Pt treats with Toradol, with relief. Pt denies h/o spinal surgery. Pt reports he had spinal injections x 2017+. Pt underwent right SI joint injection on 8/22/19 with no relief. Pt underwent epidural L4-5 on 5/31/2021 with benefit. Pt failed to f/u after the block. He previously did not tolerate NEURONTIN secondary to sedation. He did not tolerate Cymbalta secondary to hallucinations. Patient reports an episode of bladder incontinence x 7/2019. He later clarified he could not make it to the bathroom in time. Pt denies fever, weight loss, or skin changes. The patient is RHD. Note from Katlin Contreras NP dated 12/20/08 indicating patient was seen with c/o lower back pain.  Indicated he had improvement with PT. Note from Dr. Rosales Wall 5/18/16 indicating patient was seen with c/o back and leg pain. Indicated he was seen for a second opinion for his back and leg pain. Previously followed [facility]. Original work injury 7/27/08. Indicated he had blocks x2 in 2013, x1 in 2014, and x1 in 2015. Indicated he had relief with those injections. Note from Dr. Wagner Shin 8/16/16 indicating patient was seen with c/o low back and leg pain. Indicated his MRI showed evidence of left sided lumbar radiculopathy. Pt underwent left sided SNRB on 4/9/15 with Dr. Cynthia Harrison. Pt underwent right sided SNRB on 8/5/14 with Dr. Cynthia Harrison. Pt underwent left sided L4 SNRB on 12/12/13 with Dr. Cynthia Harrison. Pt underwent left sided L4 SNRB on 5/16/13 with Dr. Beulah Lindsey note from Dr. Marcela Valdivia patient presented for back pain radiating into the BLE (L>R), which started 1 day prior. Indicated he previously was on Neurontin, but noted he ran out x 1 year ago. He previously treated with oxycodone and acetaminophen without relief. Restarted his Neurontin at 300 mg qhs, provided him a Rx for prednisone, and treated him with a Toradol injection. Note from Dr. Pete Selby 10/15/2020 indicating patient was seen with c/o right hip, low back and groin pain. Pt has h/o steroid use. Pt has pain putting on shoes and socks. Mild to moderate arthritis. Ordered an MRI of the hip. Note from Dr. Pete Selby 1/21/2021 indicating patient was seen with c/o right-sided pain. Pt has trouble sitting. Endorses mild groin discomfort. MRI of the right hip indicated mild to moderate arthritis and fraying of the labrum. No surgery on the hip at this time. Recommended PT and hip injection. Pt underwent bursa injection. Preliminary reading of lumbar plain films: Mild disc space narrowing at L4 on L5 and L5 on S1. Small anterior osteophytes on L4- L5. No acute pathology identified. Lumbar spine MRI dated 7/10/19 reviewed.  Per report, degenerative changes in the 3 lower most lumbar levels as describes with no spinal stenosis. RLE EMG dated 8/19/2020 by Dr. Johnson Sheldon was normal. Preliminary reading of pelvic plain films dated 10/14/2020 revealed: No acute pathology identified. Degenerative changes on the right relative to the left hip joint. RT Hip MRI dated 11/23/2020 films not independently reviewed. Per report, no evidence of fracture, stress fracture, or avascular necrosis involving the right hip joint. Mild-moderate right hip joint chondrosis with small foci of subchondral marrow edema. No significant joint effusion or synovitis. Mild, likely degenerative fraying anterosuperior acetabular labrum. At his last clinical appointment, patient wished to continue his current treatment. I provided him refills of Lyrica 225 mg BID.        The patient returns today with left sided low back pain and right buttocks pain. He rates his pain 2-7/10, previously 4/10. His pain is not exacerbated positionally. Pt reports a pull of the left groin previously noted right groin pain. He continues taking Lyrica 225 mg BID. Pt admits to preforming his HEP 3-4x/week. Pt denies change in bowel or bladder habits.  reviewed. Body mass index is 24.55 kg/m².     PCP: Milton Mina NP      Past Medical History:   Diagnosis Date    Arthritis     Hypertension         Social History     Socioeconomic History    Marital status:      Spouse name: Not on file    Number of children: Not on file    Years of education: Not on file    Highest education level: Not on file   Occupational History    Not on file   Tobacco Use    Smoking status: Former Smoker    Smokeless tobacco: Never Used   Substance and Sexual Activity    Alcohol use: Not Currently    Drug use: Not Currently    Sexual activity: Not on file   Other Topics Concern   [address] Service Not Asked    Blood Transfusions Not Asked    Caffeine Concern Not Asked    Occupational Exposure Not Asked    Hobby Hazards Not Asked    Sleep Concern Not Asked    Stress Concern Not Asked    Weight Concern Not Asked    Special Diet Not Asked    Back Care Not Asked    Exercise Not Asked    Bike Helmet Not Asked   [address] Not Asked    Self-Exams Not Asked   Social History Narrative    Not on file     Social Determinants of Health     Financial Resource Strain:     Difficulty of Paying Living Expenses:    Food Insecurity:     Worried About Running Out of Food in the Last Year:     [address] in the Last Year:    Transportation Needs:     Lack of Transportation (Medical):  Lack of Transportation (Non-Medical):    Physical Activity:     Days of Exercise per Week:     Minutes of Exercise per Session:    Stress:     Feeling of Stress :    Social Connections:     Frequency of Communication with Friends and Family:     Frequency of Social Gatherings with Friends and Family:     Attends [employer] Services:     Active Member of Clubs or Organizations:     Attends Club or Organization Meetings:     Marital Status:    Intimate Partner Violence:     Fear of Current or Ex-Partner:     Emotionally Abused:     Physically Abused:     Sexually Abused:        Current Outpatient Medications   Medication Sig Dispense Refill    pregabalin (LYRICA) 225 mg capsule Take 1 Cap by mouth two (2) times a day. Max Daily Amount: 450 mg. 60 Cap 1    MEN'S MULTI-VITAMIN PO Take  by mouth.  pregabalin (LYRICA) 225 mg capsule Take 1 Capsule by mouth two (2) times a day. Max Daily Amount: 450 mg. 60 Capsule 0    pregabalin (Lyrica) 150 mg capsule Take 1 Cap by mouth two (2) times a day. Max Daily Amount: 300 mg. (Patient not taking: Reported on 6/28/2021) 60 Cap 2    pregabalin (Lyrica) 150 mg capsule Take 1 Cap by mouth two (2) times a day. Max Daily Amount: 300 mg. (Patient not taking: Reported on 6/28/2021) 60 Cap 1    pregabalin (Lyrica) 75 mg capsule Take 1 Cap by mouth two (2) times a day. Max Daily Amount: 150 mg.  (Patient not taking: Reported on 3/26/2021) 60 Cap 0    gabapentin (NEURONTIN) 300 mg capsule Take 1 Cap by mouth three (3) times daily. Max Daily Amount: 900 mg. (Patient not taking: Reported on 10/12/2021) 90 Cap 1    ketorolac (TORADOL) 10 mg tablet Take  by mouth. (Patient not taking: Reported on 6/28/2021)         Allergies   Allergen Reactions    Banana Hives          PHYSICAL EXAMINATION    Visit Vitals  /78 (BP 1 Location: Left upper arm)   Pulse 62   Temp 98.6 °F (37 °C)   Resp 18   Wt 176 lb (79.8 kg)   SpO2 100%   BMI 24.55 kg/m²       CONSTITUTIONAL: NAD, A&O x 3  SENSATION: Intact to light touch throughout  RANGE OF MOTION: The patient has full passive range of motion in all four extremities. MOTOR:  Straight Leg Raise: Negative, bilateral    Ambulates with a single point cane               Hip Flex Knee Ext Knee Flex Ankle DF GTE Ankle PF Tone   Right +4/5 +4/5 +4/5 +4/5 +4/5 +4/5 +4/5   Left +4/5 +4/5 +4/5 +4/5 +4/5 +4/5 +4/5       ASSESSMENT   Diagnoses and all orders for this visit:    1. Low back pain at multiple sites    2. Lumbosacral spondylosis without myelopathy    3. Lumbar neuritis  -     pregabalin (LYRICA) 300 mg capsule; Take 1 Capsule by mouth two (2) times a day. Max Daily Amount: 600 mg.    4. DDD (degenerative disc disease), lumbar    5. Hip pain    6. Osteoarthritis of right hip, unspecified osteoarthritis type      IMPRESSION AND PLAN:  Patient returns to the office today with c/o left sided low back pain and right buttocks pain. Multiple treatment options were discussed. I offered blocks, pt wished to proceed with an injection. I offered increasing his Lyrica, pt wished to proceed. I will increase his Lyrica 225 mg BID to 300 mg BID. Patient was advised to call office if intolerant to higher dose. I recommended he increase the frequency of HEP to daily. I discussed consideration for a referral to pain management. Patient is neurologically intact.  I will see the patient back in 1 month's time or earlier if needed. Written by King Smith, as dictated by Pablo Burger MD  I examined the patient, reviewed and agree with the note.

## 2021-10-12 ENCOUNTER — OFFICE VISIT (OUTPATIENT)
Dept: ORTHOPEDIC SURGERY | Age: 41
End: 2021-10-12
Payer: COMMERCIAL

## 2021-10-12 VITALS
WEIGHT: 176 LBS | HEART RATE: 62 BPM | TEMPERATURE: 98.6 F | BODY MASS INDEX: 24.55 KG/M2 | DIASTOLIC BLOOD PRESSURE: 78 MMHG | RESPIRATION RATE: 18 BRPM | OXYGEN SATURATION: 100 % | SYSTOLIC BLOOD PRESSURE: 117 MMHG

## 2021-10-12 DIAGNOSIS — M54.16 LUMBAR NEURITIS: ICD-10-CM

## 2021-10-12 DIAGNOSIS — M51.36 DDD (DEGENERATIVE DISC DISEASE), LUMBAR: ICD-10-CM

## 2021-10-12 DIAGNOSIS — M54.50 LOW BACK PAIN AT MULTIPLE SITES: Primary | ICD-10-CM

## 2021-10-12 DIAGNOSIS — M47.817 LUMBOSACRAL SPONDYLOSIS WITHOUT MYELOPATHY: ICD-10-CM

## 2021-10-12 DIAGNOSIS — M25.559 HIP PAIN: ICD-10-CM

## 2021-10-12 DIAGNOSIS — M16.11 OSTEOARTHRITIS OF RIGHT HIP, UNSPECIFIED OSTEOARTHRITIS TYPE: ICD-10-CM

## 2021-10-12 PROCEDURE — 99214 OFFICE O/P EST MOD 30 MIN: CPT | Performed by: PHYSICAL MEDICINE & REHABILITATION

## 2021-10-12 RX ORDER — PREGABALIN 300 MG/1
300 CAPSULE ORAL 2 TIMES DAILY
Qty: 60 CAPSULE | Refills: 1 | Status: SHIPPED | OUTPATIENT
Start: 2021-10-12 | End: 2021-11-09 | Stop reason: SDUPTHER

## 2021-10-12 NOTE — Clinical Note
10/12/2021    Patient: Cari Brito. YOB: 1980   Date of Visit: 10/12/2021     Aurora Underwood NP  Via     Dear Aurora Underwood NP,      Thank you for referring Mr. Anabel Ni to [address] for evaluation. My notes for this consultation are attached. If you have questions, please do not hesitate to call me. I look forward to following your patient along with you.       Sincerely,    Jamal Rivera MD

## 2021-11-08 NOTE — PROGRESS NOTES
[facility]  [address], [address]  Phone: [phone]  Fax: [phone]        PROGRESS NOTE      HISTORY OF PRESENT ILLNESS:  The patient is a 42 y.o. male and was seen today for follow up of left sided low back pain and right buttocks pain. Pt reports a pull of the left groin previously noted right groin pain. Previously seen for right-sided lower back and buttocks pain radiating into the anterior right thigh (right buttocks pain >>). Previously, he was seen for low back and right groin pain radiating anteromedially into the RLE to the knee. Previously, he was seen for low back pain radiating into the RLE in an L5-S1 distribution to the knee. Additionally, he endorses paraesthesias in the inferior aspect of his bilateral feet. Initially had c/o low back pain radiating into the BLE in and L4 distribution to the knees since work injury in 2007, worse x 3 weeks. Prior to the acute exacerbation, he reports his chronic baseline level of pain 4/10. Pt reports immediate onset of pain following injury. Originally through Workers comp but they have since settled. His groin pain is increased by activities such as putting on socks and shoes and getting into and out of a car. Pt had an increase in right groin pain radiating anteromedially into the RLE to the knee. Pt treats with Toradol, with relief. Pt denies h/o spinal surgery. Pt reports he had spinal injections x 2017+. Pt underwent right SI joint injection on 8/22/19 with no relief.  Pt underwent epidural L4-5 on 5/31/2021 with benefit. Pt failed to f/u after the block. He previously did not tolerate NEURONTIN secondary to sedation. He did not tolerate Cymbalta secondary to hallucinations. Patient reports an episode of bladder incontinence x 7/2019. He later clarified he could not make it to the bathroom in time. Pt denies fever, weight loss, or skin changes. The patient is RHD.  Note from Katlin Contreras NP dated 12/20/08 indicating patient was seen with c/o lower back pain. Indicated he had improvement with PT. Note from Dr. Blank Page 5/18/16 indicating patient was seen with c/o back and leg pain. Indicated he was seen for a second opinion for his back and leg pain. Previously followed [facility]. Original work injury 7/27/08. Indicated he had blocks x2 in 2013, x1 in 2014, and x1 in 2015. Indicated he had relief with those injections. Note from Dr. Blank Page 8/16/16 indicating patient was seen with c/o low back and leg pain. Indicated his MRI showed evidence of left sided lumbar radiculopathy. Pt underwent left sided SNRB on 4/9/15 with Dr. Micky Figueroa. Pt underwent right sided SNRB on 8/5/14 with Dr. Micky Figueroa. Pt underwent left sided L4 SNRB on 12/12/13 with Dr. Micky Figueroa. Pt underwent left sided L4 SNRB on 5/16/13 with Dr. Kelly Hansen note from Dr. Natalie Aaron patient presented for back pain radiating into the BLE (L>R), which started 1 day prior. Indicated he previously was on Neurontin, but noted he ran out x 1 year ago. He previously treated with oxycodone and acetaminophen without relief. Restarted his Neurontin at 300 mg qhs, provided him a Rx for prednisone, and treated him with a Toradol injection. Note from Dr. Charo Weston 10/15/2020 indicating patient was seen with c/o right hip, low back and groin pain. Pt has h/o steroid use. Pt has pain putting on shoes and socks. Mild to moderate arthritis. Ordered an MRI of the hip. Note from Dr. Charo Weston 1/21/2021 indicating patient was seen with c/o right-sided pain. Pt has trouble sitting. Endorses mild groin discomfort. MRI of the right hip indicated mild to moderate arthritis and fraying of the labrum. No surgery on the hip at this time. Recommended PT and hip injection. Pt underwent bursa injection. Preliminary reading of lumbar plain films: Mild disc space narrowing at L4 on L5 and L5 on S1.  Small anterior osteophytes on L4- L5. No acute pathology identified. Lumbar spine MRI dated 7/10/19 reviewed. Per report, degenerative changes in the 3 lower most lumbar levels as describes with no spinal stenosis. RLE EMG dated 8/19/2020 by Dr. Sharath Webb was normal. Preliminary reading of pelvic plain films dated 10/14/2020 revealed: No acute pathology identified. Degenerative changes on the right relative to the left hip joint. RT Hip MRI dated 11/23/2020 films not independently reviewed. Per report, no evidence of fracture, stress fracture, or avascular necrosis involving the right hip joint. Mild-moderate right hip joint chondrosis with small foci of subchondral marrow edema. No significant joint effusion or synovitis. Mild, likely degenerative fraying anterosuperior acetabular labrum. At his last clinical appointment, I increased his Lyrica 225 mg BID to 300 mg BID. Patient was advised to call office if intolerant to higher dose. I recommended he increase the frequency of HEP to daily. I discussed consideration for a referral to pain management.       The patient returns today and reports pain location and distribution remains unchanged. He rates his pain 1-3/10, previously 2-7/10. He is tolerating the Lyrica 300 mg BID with good relief, he states his right groin pain is less intense in nature with the medication. Pt admits he f/u with Dr. Eyad Soto at the beginning of this year with relief of groin pain following an injection. He is compliant with his HEP. Pt denies change in bowel or bladder habits.  reviewed. Body mass index is 24.69 kg/m².     PCP: James Cerda NP      Past Medical History:   Diagnosis Date    Arthritis     Hypertension         Social History     Socioeconomic History    Marital status:      Spouse name: Not on file    Number of children: Not on file    Years of education: Not on file    Highest education level: Not on file   Occupational History    Not on file   Tobacco Use  Smoking status: Former Smoker    Smokeless tobacco: Never Used   Substance and Sexual Activity    Alcohol use: Not Currently    Drug use: Not Currently    Sexual activity: Not on file   Other Topics Concern     Service Not Asked    Blood Transfusions Not Asked    Caffeine Concern Not Asked    Occupational Exposure Not Asked    Hobby Hazards Not Asked    Sleep Concern Not Asked    Stress Concern Not Asked    Weight Concern Not Asked    Special Diet Not Asked    Back Care Not Asked    Exercise Not Asked    Bike Helmet Not Asked   [address] Not Asked    Self-Exams Not Asked   Social History Narrative    Not on file     Social Determinants of Health     Financial Resource Strain:     Difficulty of Paying Living Expenses: Not on file   Food Insecurity:     Worried About Running Out of Food in the Last Year: Not on file    [place] of Food in the Last Year: Not on file   Transportation Needs:     Lack of Transportation (Medical): Not on file    Lack of Transportation (Non-Medical):  Not on file   Physical Activity:     Days of Exercise per Week: Not on file    Minutes of Exercise per Session: Not on file   Stress:     Feeling of Stress : Not on file   Social Connections:     Frequency of Communication with Friends and Family: Not on file    Frequency of Social Gatherings with Friends and Family: Not on file    Attends Latter-day Services: Not on file    Active Member of [address] [place] [address] [employer] or Organizations: Not on file    Attends Club or Organization Meetings: Not on file    Marital Status: Not on file   Intimate Partner Violence:     Fear of Current or Ex-Partner: Not on file    Emotionally Abused: Not on file    Physically Abused: Not on file    Sexually Abused: Not on file   Housing Stability:     Unable to Pay for Housing in the Last Year: Not on file    Number of [place] in the Last Year: Not on file    Unstable Housing in the Last Year: Not on file       Current Outpatient Medications   Medication Sig Dispense Refill    pregabalin (LYRICA) 300 mg capsule Take 1 Capsule by mouth two (2) times a day. Max Daily Amount: 600 mg. 60 Capsule 1    MEN'S MULTI-VITAMIN PO Take  by mouth.  gabapentin (NEURONTIN) 300 mg capsule Take 1 Cap by mouth three (3) times daily. Max Daily Amount: 900 mg. (Patient not taking: Reported on 10/12/2021) 90 Cap 1    ketorolac (TORADOL) 10 mg tablet Take  by mouth. (Patient not taking: Reported on 6/28/2021)         Allergies   Allergen Reactions    Banana Hives          PHYSICAL EXAMINATION    Visit Vitals  Pulse 60   Temp 98.7 °F (37.1 °C)   Resp 17   Wt 177 lb (80.3 kg)   SpO2 100%   BMI 24.69 kg/m²       CONSTITUTIONAL: NAD, A&O x 3  SENSATION: Intact to light touch throughout  RANGE OF MOTION: The patient has full passive range of motion in all four extremities. MOTOR:  Straight Leg Raise: Negative, bilateral    Ambulates with a single point cane               Hip Flex Knee Ext Knee Flex Ankle DF GTE Ankle PF Tone   Right +4/5 +4/5 +4/5 +4/5 +4/5 +4/5 +4/5   Left +4/5 +4/5 +4/5 +4/5 +4/5 +4/5 +4/5       ASSESSMENT   Diagnoses and all orders for this visit:    1. Low back pain at multiple sites    2. Lumbosacral spondylosis without myelopathy    3. Lumbar neuritis    4. DDD (degenerative disc disease), lumbar    5. Hip pain    6. Osteoarthritis of right hip, unspecified osteoarthritis type      IMPRESSION AND PLAN:  Patient returns to the office today with c/o left sided low back pain and right buttocks pain w/ right groin pain. Multiple treatment options were discussed. Pt previously saw Ajit Reich and had an injection with benefit over a year ago. I will refer him back to him secondary to right groin pain. I offered starting him on a different neuropathic medication, pt declined at this time. Patient wished to continue his current treatment. I provided him refills of Lyrica 300 mg BID. I encouraged him to continue to perform his daily HEP.  Patient is neurologically intact. I will see the patient back in 6 week's time or earlier if needed. Written by Radha Campos, as dictated by Waylan Goltz, MD  I examined the patient, reviewed and agree with the note.

## 2021-11-09 ENCOUNTER — OFFICE VISIT (OUTPATIENT)
Dept: ORTHOPEDIC SURGERY | Age: 41
End: 2021-11-09
Payer: COMMERCIAL

## 2021-11-09 VITALS
WEIGHT: 177 LBS | HEART RATE: 60 BPM | RESPIRATION RATE: 17 BRPM | TEMPERATURE: 98.7 F | OXYGEN SATURATION: 100 % | BODY MASS INDEX: 24.69 KG/M2

## 2021-11-09 DIAGNOSIS — M51.36 DDD (DEGENERATIVE DISC DISEASE), LUMBAR: ICD-10-CM

## 2021-11-09 DIAGNOSIS — M16.11 OSTEOARTHRITIS OF RIGHT HIP, UNSPECIFIED OSTEOARTHRITIS TYPE: ICD-10-CM

## 2021-11-09 DIAGNOSIS — M54.16 LUMBAR NEURITIS: ICD-10-CM

## 2021-11-09 DIAGNOSIS — M25.559 HIP PAIN: ICD-10-CM

## 2021-11-09 DIAGNOSIS — M47.817 LUMBOSACRAL SPONDYLOSIS WITHOUT MYELOPATHY: ICD-10-CM

## 2021-11-09 DIAGNOSIS — M54.50 LOW BACK PAIN AT MULTIPLE SITES: Primary | ICD-10-CM

## 2021-11-09 PROCEDURE — 99213 OFFICE O/P EST LOW 20 MIN: CPT | Performed by: PHYSICAL MEDICINE & REHABILITATION

## 2021-11-09 RX ORDER — PREGABALIN 300 MG/1
300 CAPSULE ORAL 2 TIMES DAILY
Qty: 180 CAPSULE | Refills: 0 | Status: SHIPPED | OUTPATIENT
Start: 2021-11-09 | End: 2022-01-24 | Stop reason: SDUPTHER

## 2021-11-09 NOTE — Clinical Note
11/9/2021    Patient: Cari Brito. YOB: 1980   Date of Visit: 11/9/2021     Aurora Underwood NP  Via     Dear Aurroa Underwood NP,      Thank you for referring Mr. Anabel Ni to [address] for evaluation. My notes for this consultation are attached. If you have questions, please do not hesitate to call me. I look forward to following your patient along with you.       Sincerely,    Jamal Rivera MD

## 2021-12-17 ENCOUNTER — OFFICE VISIT (OUTPATIENT)
Dept: ORTHOPEDIC SURGERY | Age: 41
End: 2021-12-17
Payer: COMMERCIAL

## 2021-12-17 VITALS
BODY MASS INDEX: 24.89 KG/M2 | HEIGHT: 71 IN | WEIGHT: 177.8 LBS | OXYGEN SATURATION: 98 % | HEART RATE: 96 BPM | TEMPERATURE: 96.9 F

## 2021-12-17 DIAGNOSIS — M25.551 RIGHT HIP PAIN: ICD-10-CM

## 2021-12-17 DIAGNOSIS — M16.11 ARTHRITIS OF RIGHT HIP: Primary | ICD-10-CM

## 2021-12-17 PROCEDURE — 73502 X-RAY EXAM HIP UNI 2-3 VIEWS: CPT | Performed by: PHYSICIAN ASSISTANT

## 2021-12-17 PROCEDURE — 99214 OFFICE O/P EST MOD 30 MIN: CPT | Performed by: PHYSICIAN ASSISTANT

## 2021-12-17 RX ORDER — MELOXICAM 15 MG/1
15 TABLET ORAL DAILY
Qty: 30 TABLET | Refills: 2 | Status: SHIPPED | OUTPATIENT
Start: 2021-12-17 | End: 2022-03-17

## 2021-12-17 NOTE — PROGRESS NOTES
[address] [place] [address]  [phone]           Patient: Giacomo Mccallum. MRN: [id number]       SSN: [id number]  YOB: 1980        AGE: 42 y.o. SEX: male  Body mass index is 24.8 kg/m². PCP: Kaden Banerjee NP  12/17/21      This office note has been dictated. REVIEW OF SYSTEMS:  Constitutional: Negative for fever, chills, weight loss and malaise/fatigue. HENT: Negative. Eyes: Negative. Respiratory: Negative. Cardiovascular: Negative. Gastrointestinal: No bowel incontinence or constipation. Genitourinary: No bladder incontinence or saddle anesthesia. Skin: Negative. Neurological: Negative. Endo/Heme/Allergies: Negative. Psychiatric/Behavioral: Negative. Musculoskeletal: As per HPI above. Past Medical History:   Diagnosis Date    Arthritis     Hypertension          Current Outpatient Medications:     pregabalin (LYRICA) 300 mg capsule, Take 1 Capsule by mouth two (2) times a day. Max Daily Amount: 600 mg., Disp: 180 Capsule, Rfl: 0    MEN'S MULTI-VITAMIN PO, Take  by mouth., Disp: , Rfl:     gabapentin (NEURONTIN) 300 mg capsule, Take 1 Cap by mouth three (3) times daily. Max Daily Amount: 900 mg. (Patient not taking: Reported on 10/12/2021), Disp: 90 Cap, Rfl: 1    ketorolac (TORADOL) 10 mg tablet, Take  by mouth.  (Patient not taking: Reported on 6/28/2021), Disp: , Rfl:     Allergies   Allergen Reactions    Banana Hives       Social History     Socioeconomic History    Marital status:      Spouse name: Not on file    Number of children: Not on file    Years of education: Not on file    Highest education level: Not on file   Occupational History    Not on file   Tobacco Use    Smoking status: Former Smoker    Smokeless tobacco: Never Used   Substance and Sexual Activity    Alcohol use: Not Currently    Drug use: Not Currently    Sexual activity: Not on file   Other Topics Concern     Service Not Asked    Blood Transfusions Not Asked    Caffeine Concern Not Asked    Occupational Exposure Not Asked    Hobby Hazards Not Asked    Sleep Concern Not Asked    Stress Concern Not Asked    Weight Concern Not Asked    Special Diet Not Asked    Back Care Not Asked    Exercise Not Asked    Bike Helmet Not Asked   [address] Not Asked    Self-Exams Not Asked   Social History Narrative    Not on file     Social Determinants of Health     Financial Resource Strain:     Difficulty of Paying Living Expenses: Not on file   Food Insecurity:     Worried About Running Out of Food in the Last Year: Not on file    Clay of Food in the Last Year: Not on file   Transportation Needs:     Lack of Transportation (Medical): Not on file    Lack of Transportation (Non-Medical): Not on file   Physical Activity:     Days of Exercise per Week: Not on file    Minutes of Exercise per Session: Not on file   Stress:     Feeling of Stress : Not on file   Social Connections:     Frequency of Communication with Friends and Family: Not on file    Frequency of Social Gatherings with Friends and Family: Not on file    Attends Druze Services: Not on file    Active Member of [address] [place] [address] Eat or Organizations: Not on file    Attends Club or Organization Meetings: Not on file    Marital Status: Not on file   Intimate Partner Violence:     Fear of Current or Ex-Partner: Not on file    Emotionally Abused: Not on file    Physically Abused: Not on file    Sexually Abused: Not on file   Housing Stability:     Unable to Pay for Housing in the Last Year: Not on file    Number of [place] in the Last Year: Not on file    Unstable Housing in the Last Year: Not on file       Past Surgical History:   Procedure Laterality Date    HX VASECTOMY             Patient seen evaluate today for right hip pain. The patient has longstanding history of right hip pain.   He is currently taking no anti-inflammatories. He has pain that radiates into his groin with certain movements. He also has some back problems been followed by spine. He is currently on Lyrica. Patient denies recent fevers, chills, chest pain, SOB, or injuries. No recent systemic changes noted. A 12-point review of systems is performed today. Pertinent positives are noted. All other systems reviewed and otherwise are negative. Physical exam: General: Alert and oriented x3, nad.  well-developed, well nourished. normal affect, AF. NC/AT, EOMI, neck supple, trachea midline, no JVD present. Breathing is non-labored. Examination of lower extremities reveals pretty well-maintained range of motion of the hips. With forced internal rotation and flexion he does have some discomfort into the groin. No catching or locking noted. Neck straight leg raise. Negative calf tenderness. Negative Homans. No signs of DVT present. Review of previous MRI of the right hip:  IMPRESSION:  --------------     1. No evidence of fracture, stress fracture, or avascular necrosis involving the  right hip joint. 2. Mild-moderate right hip joint chondrosis with small foci of subchondral  marrow edema. No significant joint effusion or synovitis. 3. Mild, likely degenerative fraying anterosuperior acetabular labrum    Radiographs in the office today 12/17/2021 at the [facility] location include AP pelvis, AP and crosstable of the right hip shows moderate osteoarthritis of the hip with posterior wear seen on the crosstable lateral.    Assessment: Right hip osteoarthritis    Plan: At this point, we will start the patient on Mobic 15 mg once a day with food. He is instructed on use as well as usual precautions. We will get him set up for an intra-articular injection of the right hip. We will see him back about 4 weeks after the injection to  efficacy. He will call with any questions or concerns arise.   Surgical invention was discussed and not indicated at this time for his moderate arthritis.           JR Arita MPAS, PA-C, ATC

## 2022-01-04 ENCOUNTER — HOSPITAL ENCOUNTER (OUTPATIENT)
Dept: GENERAL RADIOLOGY | Age: 42
Discharge: HOME | End: 2022-01-04
Attending: PHYSICIAN ASSISTANT
Payer: COMMERCIAL

## 2022-01-04 DIAGNOSIS — M16.11 ARTHRITIS OF RIGHT HIP: ICD-10-CM

## 2022-01-04 PROCEDURE — 74011000250 HC RX REV CODE- 250: Performed by: PHYSICIAN ASSISTANT

## 2022-01-04 PROCEDURE — 74011250636 HC RX REV CODE- 250/636: Performed by: PHYSICIAN ASSISTANT

## 2022-01-04 PROCEDURE — 77002 NEEDLE LOCALIZATION BY XRAY: CPT

## 2022-01-04 PROCEDURE — 74011000636 HC RX REV CODE- 636: Performed by: PHYSICIAN ASSISTANT

## 2022-01-04 RX ORDER — METHYLPREDNISOLONE ACETATE 80 MG/ML
80 INJECTION, SUSPENSION INTRA-ARTICULAR; INTRALESIONAL; INTRAMUSCULAR; SOFT TISSUE ONCE
Status: COMPLETED | OUTPATIENT
Start: 2022-01-04 | End: 2022-01-04

## 2022-01-04 RX ORDER — TRIAMCINOLONE ACETONIDE 40 MG/ML
40 INJECTION, SUSPENSION INTRA-ARTICULAR; INTRAMUSCULAR
Status: DISCONTINUED | OUTPATIENT
Start: 2022-01-04 | End: 2022-01-04 | Stop reason: RX

## 2022-01-04 RX ORDER — LIDOCAINE HYDROCHLORIDE 10 MG/ML
5 INJECTION, SOLUTION EPIDURAL; INFILTRATION; INTRACAUDAL; PERINEURAL
Status: COMPLETED | OUTPATIENT
Start: 2022-01-04 | End: 2022-01-04

## 2022-01-04 RX ORDER — METHYLPREDNISOLONE ACETATE 80 MG/ML
80 INJECTION, SUSPENSION INTRA-ARTICULAR; INTRALESIONAL; INTRAMUSCULAR; SOFT TISSUE ONCE
Status: DISCONTINUED | OUTPATIENT
Start: 2022-01-04 | End: 2022-01-04 | Stop reason: RX

## 2022-01-04 RX ADMIN — METHYLPREDNISOLONE ACETATE 40 MG: 80 INJECTION, SUSPENSION INTRA-ARTICULAR; INTRALESIONAL; INTRAMUSCULAR; SOFT TISSUE at 13:40

## 2022-01-04 RX ADMIN — LIDOCAINE HYDROCHLORIDE 5 ML: 10 INJECTION, SOLUTION EPIDURAL; INFILTRATION; INTRACAUDAL; PERINEURAL at 13:40

## 2022-01-04 RX ADMIN — IOPAMIDOL 2 ML: 408 INJECTION, SOLUTION INTRATHECAL at 13:40

## 2022-01-21 NOTE — PROGRESS NOTES
[facility]  [address]  Phone: [phone]  Fax: [phone]        PROGRESS NOTE      HISTORY OF PRESENT ILLNESS:  The patient is a 42 y.o. male and was seen today for follow up of left sided low back pain and right buttocks pain. Pt reports a pull of the left groin previously noted right groin pain. Previously seen for right-sided lower back and buttocks pain radiating into the anterior right thigh (right buttocks pain >>). Previously, he was seen for low back and right groin pain radiating anteromedially into the RLE to the knee. Previously, he was seen for low back pain radiating into the RLE in an L5-S1 distribution to the knee. Additionally, he endorses paraesthesias in the inferior aspect of his bilateral feet. Initially had c/o low back pain radiating into the BLE in and L4 distribution to the knees since work injury in 2007, worse x 3 weeks. Prior to the acute exacerbation, he reports his chronic baseline level of pain 4/10. Pt reports immediate onset of pain following injury. Originally through Workers comp but they have since settled. His groin pain is increased by activities such as putting on socks and shoes and getting into and out of a car. Pt had an increase in right groin pain radiating anteromedially into the RLE to the knee. Pt treats with Toradol, with relief. Pt denies h/o spinal surgery. Pt reports he had spinal injections x 2017+. Pt underwent right SI joint injection on 8/22/19 with no relief.  Pt underwent epidural L4-5 on 5/31/2021 with benefit. Pt failed to f/u after the block. He previously did not tolerate NEURONTIN secondary to sedation. He did not tolerate Cymbalta secondary to hallucinations. Patient reports an episode of bladder incontinence x 7/2019. He later clarified he could not make it to the bathroom in time. Pt denies fever, weight loss, or skin changes. The patient is RHD.  Note from Katlin Contreras NP dated 12/20/08 indicating patient was seen with c/o lower back pain. Indicated he had improvement with PT. Note from Dr. Amber Pina 5/18/16 indicating patient was seen with c/o back and leg pain. Indicated he was seen for a second opinion for his back and leg pain. Previously followed [facility]. Original work injury 7/27/08. Indicated he had blocks x2 in 2013, x1 in 2014, and x1 in 2015. Indicated he had relief with those injections. Note from Dr. Amber Pina 8/16/16 indicating patient was seen with c/o low back and leg pain. Indicated his MRI showed evidence of left sided lumbar radiculopathy. Pt underwent left sided SNRB on 4/9/15 with Dr. Shweta Farmer. Pt underwent right sided SNRB on 8/5/14 with Dr. Shweta Farmer. Pt underwent left sided L4 SNRB on 12/12/13 with Dr. Shweta Farmer. Pt underwent left sided L4 SNRB on 5/16/13 with Dr. Win Esparza note from Dr. Melissa Garcia patient presented for back pain radiating into the BLE (L>R), which started 1 day prior. Indicated he previously was on Neurontin, but noted he ran out x 1 year ago. He previously treated with oxycodone and acetaminophen without relief. Restarted his Neurontin at 300 mg qhs, provided him a Rx for prednisone, and treated him with a Toradol injection. Note from Dr. Phil Guillory 10/15/2020 indicating patient was seen with c/o right hip, low back and groin pain. Pt has h/o steroid use. Pt has pain putting on shoes and socks. Mild to moderate arthritis. Ordered an MRI of the hip. Note from Dr. Phil Guillory 1/21/2021 indicating patient was seen with c/o right-sided pain. Pt has trouble sitting. Endorses mild groin discomfort. MRI of the right hip indicated mild to moderate arthritis and fraying of the labrum. No surgery on the hip at this time. Recommended PT and hip injection. Pt underwent bursa injection. Preliminary reading of lumbar plain films: Mild disc space narrowing at L4 on L5 and L5 on S1.  Small anterior osteophytes on L4- L5. No acute pathology identified. Lumbar spine MRI dated 7/10/19 reviewed. Per report, degenerative changes in the 3 lower most lumbar levels as describes with no spinal stenosis. RLE EMG dated 8/19/2020 by Dr. Christiano Aaron was normal. Preliminary reading of pelvic plain films dated 10/14/2020 revealed: No acute pathology identified. Degenerative changes on the right relative to the left hip joint. RT Hip MRI dated 11/23/2020 films not independently reviewed. Per report, no evidence of fracture, stress fracture, or avascular necrosis involving the right hip joint. Mild-moderate right hip joint chondrosis with small foci of subchondral marrow edema. No significant joint effusion or synovitis. Mild, likely degenerative fraying anterosuperior acetabular labrum. At his last clinical appointment, pt previously saw Fuad Suarez and had an injection with benefit over a year ago. I refer him back to Dr. Nav Garcia secondary to right groin pain. I offered starting him on a different neuropathic medication, pt declined at the time. Patient wished to continue his current treatment. I provided him refills of Lyrica 300 mg BID. I encouraged him to continue to perform his daily HEP.       The patient returns today and reports pain location and distribution remains unchanged. He rates his pain 2-3/10, previously 1-3/10. Pt states he received a right hip injection on 1/4/2022 with benefit, pt reports increase in ROM with Dr. Nav Garcia. He continues taking Lyrica 300 mg BID. Pt denies change in bowel or bladder habits. He is compliant with his HEP. Note from [place], [place] dated 12/17/2021 indicating patient was seen with c/o right hip pain. Provided with Mobic and a intra-articular injection of the right hip. F/u in 4 weeks. Per note, right hip MRI revealed no evidence of fracture, stress fracture, or avascular necrosis involving the right hip joint.  Mild-moderate right hip joint chondrosis with small foci of subchondral marrow edema. No significant joint effusion or synovitis. Mild, likely degenerative fraying anterosuperior acetabular labrum       reviewed. There is no height or weight on file to calculate BMI. PCP: Pauline Jones NP      Past Medical History:   Diagnosis Date    Arthritis     Hypertension         Social History     Socioeconomic History    Marital status:      Spouse name: Not on file    Number of children: Not on file    Years of education: Not on file    Highest education level: Not on file   Occupational History    Not on file   Tobacco Use    Smoking status: Former Smoker    Smokeless tobacco: Never Used   Substance and Sexual Activity    Alcohol use: Not Currently    Drug use: Not Currently    Sexual activity: Not on file   Other Topics Concern   [address] Service Not Asked    Blood Transfusions Not Asked    Caffeine Concern Not Asked    Occupational Exposure Not Asked    Hobby Hazards Not Asked    Sleep Concern Not Asked    Stress Concern Not Asked    Weight Concern Not Asked    Special Diet Not Asked    Back Care Not Asked    Exercise Not Asked    Bike Helmet Not Asked    Seat Belt Not Asked    Self-Exams Not Asked   Social History Narrative    Not on file     Social Determinants of Health     Financial Resource Strain:     Difficulty of Paying Living Expenses: Not on file   Food Insecurity:     Worried About Running Out of Food in the Last Year: Not on file    Clay of Food in the Last Year: Not on file   Transportation Needs:     Lack of Transportation (Medical): Not on file    Lack of Transportation (Non-Medical):  Not on file   Physical Activity:     Days of Exercise per Week: Not on file    Minutes of Exercise per Session: Not on file   Stress:     Feeling of Stress : Not on file   Social Connections:     Frequency of Communication with Friends and Family: Not on file    Frequency of Social Gatherings with Friends and Family: Not on file   Bobby Roy Attends Jehovah's witness Services: Not on file    Active Member of Clubs or Organizations: Not on file    Attends Club or Organization Meetings: Not on file    Marital Status: Not on file   Intimate Partner Violence:     Fear of Current or Ex-Partner: Not on file    Emotionally Abused: Not on file    Physically Abused: Not on file    Sexually Abused: Not on file   Housing Stability:     Unable to Pay for Housing in the Last Year: Not on file    Number of [place] in the Last Year: Not on file    Unstable Housing in the Last Year: Not on file       Current Outpatient Medications   Medication Sig Dispense Refill    meloxicam (Mobic) 15 mg tablet Take 1 Tablet by mouth daily for 90 days. 30 Tablet 2    pregabalin (LYRICA) 300 mg capsule Take 1 Capsule by mouth two (2) times a day. Max Daily Amount: 600 mg. 180 Capsule 0    MEN'S MULTI-VITAMIN PO Take  by mouth. Allergies   Allergen Reactions    Banana Hives          PHYSICAL EXAMINATION    There were no vitals taken for this visit. CONSTITUTIONAL: NAD, A&O x 3  SENSATION: Intact to light touch throughout  NEURO: Dell's is negative bilaterally. RANGE OF MOTION: The patient has full passive range of motion in all four extremities. MOTOR:  Straight Leg Raise: Negative, bilateral     Ambulates with a single point cane     Shoulder AB/Flex Elbow Flex Wrist Ext Elbow Ext Wrist Flex Hand Intrin Tone   Right +4/5 +4/5 +4/5 +4/5 +4/5 +4/5 +4/5   Left +4/5 +4/5 +4/5 +4/5 +4/5 +4/5 +4/5              Hip Flex Knee Ext Knee Flex Ankle DF GTE Ankle PF Tone   Right +4/5 +4/5 +4/5 +4/5 +4/5 +4/5 +4/5   Left +4/5 +4/5 +4/5 +4/5 +4/5 +4/5 +4/5       ASSESSMENT   Diagnoses and all orders for this visit:    1. Low back pain at multiple sites    2. Lumbosacral spondylosis without myelopathy    3. Lumbar neuritis    4. DDD (degenerative disc disease), lumbar    5. Osteoarthritis of right hip, unspecified osteoarthritis type    6.  Hip pain      IMPRESSION AND PLAN:  Patient returns to the office today with c/o left sided low back pain and right buttocks pain. Multiple treatment options were discussed. I offered blocks, pt declined. Patient wished to continue his current treatment. I provided him refills of Lyrica 300 mg BID. Pending right hip injection f/u appointment with Dr. Abe Wilde. I encouraged him to continue to perform his daily HEP. Patient is neurologically intact. I will see the patient back in 3 month's time or earlier if needed. Written by Juanjo Bridges, as dictated by Delonte Schumacher MD  I examined the patient, reviewed and agree with the note.

## 2022-01-24 ENCOUNTER — OFFICE VISIT (OUTPATIENT)
Dept: ORTHOPEDIC SURGERY | Age: 42
End: 2022-01-24
Payer: COMMERCIAL

## 2022-01-24 VITALS
SYSTOLIC BLOOD PRESSURE: 115 MMHG | WEIGHT: 181 LBS | BODY MASS INDEX: 24.52 KG/M2 | TEMPERATURE: 97.5 F | HEART RATE: 90 BPM | OXYGEN SATURATION: 98 % | DIASTOLIC BLOOD PRESSURE: 67 MMHG | HEIGHT: 72 IN

## 2022-01-24 DIAGNOSIS — M51.36 DDD (DEGENERATIVE DISC DISEASE), LUMBAR: ICD-10-CM

## 2022-01-24 DIAGNOSIS — M54.50 LOW BACK PAIN AT MULTIPLE SITES: Primary | ICD-10-CM

## 2022-01-24 DIAGNOSIS — M16.11 OSTEOARTHRITIS OF RIGHT HIP, UNSPECIFIED OSTEOARTHRITIS TYPE: ICD-10-CM

## 2022-01-24 DIAGNOSIS — M54.16 LUMBAR NEURITIS: ICD-10-CM

## 2022-01-24 DIAGNOSIS — M47.817 LUMBOSACRAL SPONDYLOSIS WITHOUT MYELOPATHY: ICD-10-CM

## 2022-01-24 DIAGNOSIS — M25.559 HIP PAIN: ICD-10-CM

## 2022-01-24 PROCEDURE — 99213 OFFICE O/P EST LOW 20 MIN: CPT | Performed by: PHYSICAL MEDICINE & REHABILITATION

## 2022-01-24 RX ORDER — PREGABALIN 300 MG/1
300 CAPSULE ORAL 2 TIMES DAILY
Qty: 180 CAPSULE | Refills: 0 | Status: SHIPPED | OUTPATIENT
Start: 2022-01-24 | End: 2022-03-29 | Stop reason: SDUPTHER

## 2022-01-24 NOTE — Clinical Note
1/24/2022    Patient: Bari Mckeon. YOB: 1980   Date of Visit: 1/24/2022     Sly Henley NP  Via     Dear Sly Henley NP,      Thank you for referring Mr. Loki Mina to [address] for evaluation. My notes for this consultation are attached. If you have questions, please do not hesitate to call me. I look forward to following your patient along with you.       Sincerely,    Ella Joy MD

## 2022-03-29 DIAGNOSIS — M54.16 LUMBAR NEURITIS: ICD-10-CM

## 2022-03-29 DIAGNOSIS — M47.817 LUMBOSACRAL SPONDYLOSIS WITHOUT MYELOPATHY: ICD-10-CM

## 2022-03-29 RX ORDER — PREGABALIN 300 MG/1
300 CAPSULE ORAL 2 TIMES DAILY
Qty: 180 CAPSULE | Refills: 0 | Status: SHIPPED | OUTPATIENT
Start: 2022-03-29 | End: 2022-04-25 | Stop reason: SDUPTHER

## 2022-03-29 NOTE — TELEPHONE ENCOUNTER
Last Visit: 1/24/22 with MD James Reeder  Next Appointment: 4/25/22 with MD James Reeder  Previous Refill Encounter(s): 1/24/22 #[id number]    Requested Prescriptions     Pending Prescriptions Disp Refills    pregabalin (LYRICA) 300 mg capsule 180 Capsule 0     Sig: Take 1 Capsule by mouth two (2) times a day. Max Daily Amount: 600 mg.

## 2022-04-22 NOTE — PROGRESS NOTES
[facility]  [address], [address]  Phone: [phone]  Fax: [phone]        PROGRESS NOTE      HISTORY OF PRESENT ILLNESS:  The patient is a 42 y.o. male and was seen today for follow up of left sided low back pain and right buttocks pain. Pt reports a pull of the left groin previously noted right groin pain. Previously seen for right-sided lower back and buttocks pain radiating into the anterior right thigh (right buttocks pain >>). Previously, he was seen for low back and right groin pain radiating anteromedially into the RLE to the knee. Previously, he was seen for low back pain radiating into the RLE in an L5-S1 distribution to the knee. Additionally, he endorses paraesthesias in the inferior aspect of his bilateral feet. Initially had c/o low back pain radiating into the BLE in and L4 distribution to the knees since work injury in 2007, worse x 3 weeks. Prior to the acute exacerbation, he reports his chronic baseline level of pain 4/10. Pt reports immediate onset of pain following injury. Originally through Workers comp but they have since settled. His groin pain is increased by activities such as putting on socks and shoes and getting into and out of a car. Pt had an increase in right groin pain radiating anteromedially into the RLE to the knee. Pt treats with Toradol, with relief. Pt denies h/o spinal surgery. Pt reports he had spinal injections x 2017+. Pt underwent right SI joint injection on 8/22/19 with no relief.  Pt underwent epidural L4-5 on 5/31/2021 with benefit. Pt failed to f/u after the block. Pt states he received a right hip injection on 1/4/2022 with benefit from Dr. Pauline Hauser, pt reports increase in ROM. He previously did not tolerate NEURONTIN secondary to sedation. He did not tolerate CYMBALTA secondary to hallucinations.  Patient reports an episode of bladder incontinence x 7/2019. He later clarified he could not make it to the bathroom in time. Pt denies fever, weight loss, or skin changes. The patient is RHD. Note from Katlin Contreras NP dated 12/20/08 indicating patient was seen with c/o lower back pain. Indicated he had improvement with PT. Note from Dr. Humberto Sykes 5/18/16 indicating patient was seen with c/o back and leg pain. Indicated he was seen for a second opinion for his back and leg pain. Previously followed [facility]. Original work injury 7/27/08. Indicated he had blocks x2 in 2013, x1 in 2014, and x1 in 2015. Indicated he had relief with those injections. Note from Dr. Humberto Sykes 8/16/16 indicating patient was seen with c/o low back and leg pain. Indicated his MRI showed evidence of left sided lumbar radiculopathy. Pt underwent left sided SNRB on 4/9/15 with Dr. Matheus Rangel. Pt underwent right sided SNRB on 8/5/14 with Dr. Matheus Rangel. Pt underwent left sided L4 SNRB on 12/12/13 with Dr. Matheus Rangel. Pt underwent left sided L4 SNRB on 5/16/13 with Dr. Eneida Anne note from Dr. Mechelle Carpio patient presented for back pain radiating into the BLE (L>R), which started 1 day prior. Indicated he previously was on Neurontin, but noted he ran out x 1 year ago. He previously treated with oxycodone and acetaminophen without relief. Restarted his Neurontin at 300 mg qhs, provided him a Rx for prednisone, and treated him with a Toradol injection. Note from Dr. Micheal Palencia 10/15/2020 indicating patient was seen with c/o right hip, low back and groin pain. Pt has h/o steroid use. Pt has pain putting on shoes and socks. Mild to moderate arthritis. Ordered an MRI of the hip. Note from Dr. Micheal Palencia 1/21/2021 indicating patient was seen with c/o right-sided pain. Pt has trouble sitting. Endorses mild groin discomfort. MRI of the right hip indicated mild to moderate arthritis and fraying of the labrum. No surgery on the hip at this time. Recommended PT and hip injection.  Pt underwent bursa injection. Note from [address] dated 12/17/2021 indicating patient was seen with c/o right hip pain. Provided with Mobic and a intra-articular injection of the right hip. F/u in 4 weeks. Per note, right hip MRI revealed no evidence of fracture, stress fracture, or avascular necrosis involving the right hip joint. Mild-moderate right hip joint chondrosis with small foci of subchondral marrow edema. No significant joint effusion or synovitis. Mild, likely degenerative fraying anterosuperior acetabular labrum  Preliminary reading of lumbar plain films: Mild disc space narrowing at L4 on L5 and L5 on S1. Small anterior osteophytes on L4- L5. No acute pathology identified. Lumbar spine MRI dated 7/10/19 reviewed. Per report, degenerative changes in the 3 lower most lumbar levels as describes with no spinal stenosis. RLE EMG dated 8/19/2020 by Dr. Leonarda Snyder was normal. Preliminary reading of pelvic plain films dated 10/14/2020 revealed: No acute pathology identified. Degenerative changes on the right relative to the left hip joint. RT Hip MRI dated 11/23/2020 films not independently reviewed. Per report, no evidence of fracture, stress fracture, or avascular necrosis involving the right hip joint. Mild-moderate right hip joint chondrosis with small foci of subchondral marrow edema. No significant joint effusion or synovitis. Mild, likely degenerative fraying anterosuperior acetabular labrum. At his last clinical appointment, I offered blocks, pt declined. Patient wished to continue his current treatment. I provided him refills of Lyrica 300 mg BID. Pending right hip injection f/u appointment with Dr. Ivana Wills. I encouraged him to continue to perform his daily HEP.         The patient returns today with right sided low back pain radiating into the RLE in a S1 distribution to about the knee. He rates his pain 2-7/10, previously 2-3/10.  Overall, the patient reports his back pain is more intense in nature, states leg pain is about the same. He additionally c/o bilateral ankle swelling. He is compliant with his Lyrica 300 mg BID. Pt has yet to f/u with Dr. Elisa Phillips for a right hip injection. Pt states he may have misses his appointment with him. He is compliant with his HEP. Pt denies change in bowel or bladder habits. Patient denies history of glaucoma.  reviewed. Body mass index is 24.95 kg/m². PCP: Travis Herrera NP      Past Medical History:   Diagnosis Date    Arthritis     Hypertension         Social History     Socioeconomic History    Marital status:      Spouse name: Not on file    Number of children: Not on file    Years of education: Not on file    Highest education level: Not on file   Occupational History    Not on file   Tobacco Use    Smoking status: Former Smoker    Smokeless tobacco: Never Used   Substance and Sexual Activity    Alcohol use: Not Currently    Drug use: Not Currently    Sexual activity: Not on file   Other Topics Concern   [address] Service Not Asked    Blood Transfusions Not Asked    Caffeine Concern Not Asked    Occupational Exposure Not Asked    Hobby Hazards Not Asked    Sleep Concern Not Asked    Stress Concern Not Asked    Weight Concern Not Asked    Special Diet Not Asked    Back Care Not Asked    Exercise Not Asked    Bike Helmet Not Asked    Seat Belt Not Asked    Self-Exams Not Asked   Social History Narrative    Not on file     Social Determinants of Health     Financial Resource Strain:     Difficulty of Paying Living Expenses: Not on file   Food Insecurity:     Worried About Running Out of Food in the Last Year: Not on file    Clay of Food in the Last Year: Not on file   Transportation Needs:     Lack of Transportation (Medical): Not on file    Lack of Transportation (Non-Medical):  Not on file   Physical Activity:     Days of Exercise per Week: Not on file    Minutes of Exercise per Session: Not on file   Stress:     Feeling of Stress : Not on file   Social Connections:     Frequency of Communication with Friends and Family: Not on file    Frequency of Social Gatherings with Friends and Family: Not on file    Attends Sabianist Services: Not on file    Active Member of Clubs or Organizations: Not on file    Attends Club or Organization Meetings: Not on file    Marital Status: Not on file   Intimate Partner Violence:     Fear of Current or Ex-Partner: Not on file    Emotionally Abused: Not on file    Physically Abused: Not on file    Sexually Abused: Not on file   Housing Stability:     Unable to Pay for Housing in the Last Year: Not on file    Number of [place] in the Last Year: Not on file    Unstable Housing in the Last Year: Not on file       Current Outpatient Medications   Medication Sig Dispense Refill    pregabalin (LYRICA) 300 mg capsule Take 1 Capsule by mouth two (2) times a day. Max Daily Amount: 600 mg. 180 Capsule 0    MEN'S MULTI-VITAMIN PO Take  by mouth. Allergies   Allergen Reactions    Banana Hives          PHYSICAL EXAMINATION    Visit Vitals  Pulse 98   Temp 97.1 °F (36.2 °C)   Resp 19   Wt 184 lb (83.5 kg)   SpO2 99%   BMI 24.95 kg/m²       CONSTITUTIONAL: NAD, A&O x 3  SENSATION: Decreased sensation to light touch on the LLE in a S1 distribution. Otherwise, intact to light touch throughout  RANGE OF MOTION: The patient has full passive range of motion in all four extremities. MOTOR:  Straight Leg Raise: Negative, bilateral    Ambulates with a single point cane               Hip Flex Knee Ext Knee Flex Ankle DF GTE Ankle PF Tone   Right +4/5 +4/5 +4/5 +4/5 +4/5 +4/5 +4/5   Left +4/5 +4/5 +4/5 +4/5 +4/5 +4/5 +4/5     RADIOGRAPHS  Lumbar spine plain films dated 4/25/2022. 2 views: AP and lateral. Revealed: Mild disc space narrowing at L5-S1. No malalignment. No acute pathology identified. ASSESSMENT   Diagnoses and all orders for this visit:    1.  Low back pain at multiple sites  - AMB POC XRAY, SPINE, LUMBOSACRAL; 2 O    2. Lumbar neuritis    3. Lumbosacral spondylosis without myelopathy    4. DDD (degenerative disc disease), lumbar    5. Lumbar pain      IMPRESSION AND PLAN:  Patient returns to the office today with c/o right sided low back pain radiating into the RLE in a S1 distribution to about the knee. Multiple treatment options were discussed. I offered trying him on a different neuropathic medication, pt declined as he wishes to proceed with injections. I will order a new L spine MRI as his last MRI was done 3 years ago. I advised patient to bring copies of films to next visit. I will also order a repeat RLE EMG. I encouraged him to continue to perform his daily HEP. Patient is neurologically intact. I will see the patient back in 1 month's time or earlier if needed. Written by Bjorn Castleman, as dictated by Mary Rooney MD  I examined the patient, reviewed and agree with the note.

## 2022-04-25 ENCOUNTER — OFFICE VISIT (OUTPATIENT)
Dept: ORTHOPEDIC SURGERY | Age: 42
End: 2022-04-25
Payer: COMMERCIAL

## 2022-04-25 VITALS
WEIGHT: 184 LBS | TEMPERATURE: 97.1 F | OXYGEN SATURATION: 99 % | HEART RATE: 98 BPM | BODY MASS INDEX: 24.95 KG/M2 | RESPIRATION RATE: 19 BRPM

## 2022-04-25 DIAGNOSIS — M54.50 LUMBAR PAIN: ICD-10-CM

## 2022-04-25 DIAGNOSIS — M54.16 LUMBAR NEURITIS: ICD-10-CM

## 2022-04-25 DIAGNOSIS — M54.50 LOW BACK PAIN AT MULTIPLE SITES: Primary | ICD-10-CM

## 2022-04-25 DIAGNOSIS — M47.817 LUMBOSACRAL SPONDYLOSIS WITHOUT MYELOPATHY: ICD-10-CM

## 2022-04-25 DIAGNOSIS — M51.36 DDD (DEGENERATIVE DISC DISEASE), LUMBAR: ICD-10-CM

## 2022-04-25 PROCEDURE — 99213 OFFICE O/P EST LOW 20 MIN: CPT | Performed by: PHYSICAL MEDICINE & REHABILITATION

## 2022-04-25 PROCEDURE — 72100 X-RAY EXAM L-S SPINE 2/3 VWS: CPT | Performed by: PHYSICAL MEDICINE & REHABILITATION

## 2022-04-25 RX ORDER — PREGABALIN 300 MG/1
300 CAPSULE ORAL 2 TIMES DAILY
Qty: 180 CAPSULE | Refills: 0 | Status: SHIPPED | OUTPATIENT
Start: 2022-04-25 | End: 2022-09-27

## 2022-04-25 NOTE — Clinical Note
4/25/2022    Patient: Beni Moran. YOB: 1980   Date of Visit: 4/25/2022     Luis Armando Virgen NP  Via     Dear Luis Armando Virgen NP,      Thank you for referring Mr. Maya Escobedo to [address] for evaluation. My notes for this consultation are attached. If you have questions, please do not hesitate to call me. I look forward to following your patient along with you.       Sincerely,    Marilyn Ordaz MD

## 2022-04-26 ENCOUNTER — TELEPHONE (OUTPATIENT)
Dept: ORTHOPEDIC SURGERY | Age: 42
End: 2022-04-26

## 2022-04-26 DIAGNOSIS — M54.16 LUMBAR NEURITIS: ICD-10-CM

## 2022-04-26 DIAGNOSIS — M54.50 LUMBAR PAIN: ICD-10-CM

## 2022-04-26 NOTE — TELEPHONE ENCOUNTER
I have an order to schedule an EMG with Dr. Mike Ross. I reached identified voice mail and left a detailed message providing my direct number. I would like to know if he prefers AM or PM for appointments so I can get Mr. Zuri Brown scheduled.

## 2022-04-27 NOTE — TELEPHONE ENCOUNTER
EMG RLE is scheduled with Dr Sarah Cartwright, [address] [place] [address], [phone] on 05/10/2022, arrive 1:00PM, test 1:30PM. $50.00 No Show Fee. Patient is aware of appointment.

## 2022-05-02 DIAGNOSIS — M54.50 LOW BACK PAIN AT MULTIPLE SITES: ICD-10-CM

## 2022-05-02 DIAGNOSIS — M51.36 DDD (DEGENERATIVE DISC DISEASE), LUMBAR: ICD-10-CM

## 2022-05-02 DIAGNOSIS — M54.50 LUMBAR PAIN: ICD-10-CM

## 2022-05-02 DIAGNOSIS — M47.817 LUMBOSACRAL SPONDYLOSIS WITHOUT MYELOPATHY: ICD-10-CM

## 2022-05-02 DIAGNOSIS — M54.16 LUMBAR NEURITIS: ICD-10-CM

## 2022-05-06 ENCOUNTER — HOSPITAL ENCOUNTER (OUTPATIENT)
Dept: MRI IMAGING | Age: 42
Discharge: HOME | End: 2022-05-06
Payer: COMMERCIAL

## 2022-05-06 PROCEDURE — 72148 MRI LUMBAR SPINE W/O DYE: CPT

## 2022-06-01 NOTE — PROGRESS NOTES
[facility]  [address], [address]   Phone: [phone]  Fax: [phone]        PROGRESS NOTE      HISTORY OF PRESENT ILLNESS:  The patient is a 42 y.o. male and was seen today for follow up of right sided low back pain radiating into the RLE in a S1 distribution to about the knee. Previously seen for left sided low back pain and right buttocks pain. Pt reports a pull of the left groin, previously noted right groin pain. Previously seen for right-sided lower back and buttocks pain radiating into the anterior right thigh (right buttocks pain >>). Previously, he was seen for low back and right groin pain radiating anteromedially into the RLE to the knee. Previously, he was seen for low back pain radiating into the RLE in an L5-S1 distribution to the knee. Additionally, he endorsed paraesthesias in the inferior aspect of his bilateral feet. Initially had c/o low back pain radiating into the BLE in and L4 distribution to the knees since work injury in 2007, worse x 3 weeks. Prior to the acute exacerbation, he reports his chronic baseline level of pain 4/10. Pt reports immediate onset of pain following injury. Originally through Workers comp but they have since settled. His groin pain is increased by activities such as putting on socks and shoes and getting into and out of a car. Pt had an increase in right groin pain radiating anteromedially into the RLE to the knee. Pt treats with Toradol, with relief. Pt denies h/o spinal surgery. Pt reports he had spinal injections x 2017+. Pt underwent right SI joint injection on 8/22/19 with no relief.  Pt underwent epidural L4-5 on 5/31/2021 with benefit. Pt failed to f/u after the block.  Pt states he received a right hip injection on 1/4/2022 with benefit from Dr. Heena Leyva, pt reports increase in ROM. He previously did not tolerate NEURONTIN secondary to sedation. He did not tolerate CYMBALTA secondary to hallucinations. Patient reports an episode of bladder incontinence x 7/2019. He later clarified he could not make it to the bathroom in time. Pt denies fever, weight loss, or skin changes. The patient is RHD. Note from Katlin Contreras NP dated 12/20/08 indicating patient was seen with c/o lower back pain. Indicated he had improvement with PT. Note from Dr. Cyrus Cortes 5/18/16 indicating patient was seen with c/o back and leg pain. Indicated he was seen for a second opinion for his back and leg pain. Previously followed [facility]. Original work injury 7/27/08. Indicated he had blocks x2 in 2013, x1 in 2014, and x1 in 2015. Indicated he had relief with those injections. Note from Dr. Cyrus Cortes 8/16/16 indicating patient was seen with c/o low back and leg pain. Indicated his MRI showed evidence of left sided lumbar radiculopathy. Pt underwent left sided SNRB on 4/9/15 with Dr. Harris Milner. Pt underwent right sided SNRB on 8/5/14 with Dr. Harris Milner. Pt underwent left sided L4 SNRB on 12/12/13 with Dr. Harris Milner. Pt underwent left sided L4 SNRB on 5/16/13 with Dr. Maribel Perdomo note from Dr. Centeno Ny patient presented for back pain radiating into the BLE (L>R), which started 1 day prior. Indicated he previously was on Neurontin, but noted he ran out x 1 year ago. He previously treated with oxycodone and acetaminophen without relief. Restarted his Neurontin at 300 mg qhs, provided him a Rx for prednisone, and treated him with a Toradol injection. Note from Dr. Keyonna Hall 10/15/2020 indicating patient was seen with c/o right hip, low back and groin pain. Pt has h/o steroid use. Pt has pain putting on shoes and socks. Mild to moderate arthritis. Ordered an MRI of the hip. Note from Dr. Keyonna Hall 1/21/2021 indicating patient was seen with c/o right-sided pain. Pt has trouble sitting. Endorses mild groin discomfort.  MRI of the right hip indicated mild to moderate arthritis and fraying of the labrum. No surgery on the hip at this time. Recommended PT and hip injection. Pt underwent bursa injection. Note from  Jaida Carterdestiny 12/17/2021 indicating patient was seen with c/o right hip pain. Provided with Mobic and a intra-articular injection of the right hip. F/u in 4 weeks. Per note, right hip MRI revealed no evidence of fracture, stress fracture, or avascular necrosis involving the right hip joint. Mild-moderate right hip joint chondrosis with small foci of subchondral marrow edema. No significant joint effusion or synovitis. Mild, likely degenerative fraying anterosuperior acetabular labrum. Lumbar spine MRI dated 7/10/19 reviewed. Per report, degenerative changes in the 3 lower most lumbar levels as describes with no spinal stenosis. RLE EMG dated 8/19/2020 by Dr. Mary Blackwell was normal. Preliminary reading of pelvic plain films dated 10/14/2020 revealed: No acute pathology identified. Degenerative changes on the right relative to the left hip joint. RT Hip MRI dated 11/23/2020 films not independently reviewed. Per report, no evidence of fracture, stress fracture, or avascular necrosis involving the right hip joint. Mild-moderate right hip joint chondrosis with small foci of subchondral marrow edema. No significant joint effusion or synovitis. Mild, likely degenerative fraying anterosuperior acetabular labrum. Lumbar spine plain films dated 4/25/2022. 2 views: AP and lateral. Revealed: Mild disc space narrowing at L5-S1. No malalignment. No acute pathology identified. At his last clinical appointment, I offered trying him on a different neuropathic medication, pt declined as he wished to proceed with injections. I provided him refills of Lyrica 300 mg BID. I ordered a new L spine MRI as his last MRI was done 3 years ago. I advised patient to bring copies of films to next visit. I also ordered a repeat RLE EMG. I encouraged him to continue to perform his daily HEP.          The patient returns today with mild line low back pain radiating into RLE in a S1 distribution to the knee, occasional radicular sxs into the LLE. RLE pain >>LLE). He rates his pain 6/10, previously 2-7/10. He continue taking Lyrica 300 mg BID. He is compliant with his HEP. Pt denies change in bowel or bladder habits. Lumbar spine MRI dated 5/6/2022 films independently reviewed. Per report, multilevel spondylosis including left posterior lateral disc protrusions with fissuring L4-L5 and L5-S1 which can be sources of pain. Addition, there is asymmetric narrowing of the left lateral recess stenosis with potential for impingement on the descending left L5 and S1 nerve roots. Far right posterior lateral disc bulge at L3-L4 abuts the extraforaminal segment of the right L3 nerve root. Recommend correlation for corresponding radicular symptoms. No acute osseous abnormality. A RLE EMG dated 5/10/2022 by Dr. Galindo Torres was within normal limits.  reviewed. Body mass index is 24.95 kg/m².     PCP: Ladarius Owusu NP      Past Medical History:   Diagnosis Date    Arthritis     Hypertension         Social History     Socioeconomic History    Marital status:      Spouse name: Not on file    Number of children: Not on file    Years of education: Not on file    Highest education level: Not on file   Occupational History    Not on file   Tobacco Use    Smoking status: Former Smoker    Smokeless tobacco: Never Used   Substance and Sexual Activity    Alcohol use: Not Currently    Drug use: Not Currently    Sexual activity: Not on file   Other Topics Concern   [address] Service Not Asked    Blood Transfusions Not Asked    Caffeine Concern Not Asked    Occupational Exposure Not Asked    Hobby Hazards Not Asked    Sleep Concern Not Asked    Stress Concern Not Asked    Weight Concern Not Asked    Special Diet Not Asked    Back Care Not Asked    Exercise Not Asked    Bike Helmet Not Asked   [address] Not Asked    Self-Exams Not Asked   Social History Narrative    Not on file     Social Determinants of Health     Financial Resource Strain:     Difficulty of Paying Living Expenses: Not on file   Food Insecurity:     Worried About Running Out of Food in the Last Year: Not on file    Clay of Food in the Last Year: Not on file   Transportation Needs:     Lack of Transportation (Medical): Not on file    Lack of Transportation (Non-Medical): Not on file   Physical Activity:     Days of Exercise per Week: Not on file    Minutes of Exercise per Session: Not on file   Stress:     Feeling of Stress : Not on file   Social Connections:     Frequency of Communication with Friends and Family: Not on file    Frequency of Social Gatherings with Friends and Family: Not on file    Attends Worship Services: Not on file    Active Member of [address] [place] [address] [employer] or Organizations: Not on file    Attends Club or Organization Meetings: Not on file    Marital Status: Not on file   Intimate Partner Violence:     Fear of Current or Ex-Partner: Not on file    Emotionally Abused: Not on file    Physically Abused: Not on file    Sexually Abused: Not on file   Housing Stability:     Unable to Pay for Housing in the Last Year: Not on file    Number of [place] in the Last Year: Not on file    Unstable Housing in the Last Year: Not on file       Current Outpatient Medications   Medication Sig Dispense Refill    pregabalin (LYRICA) 300 mg capsule Take 1 Capsule by mouth two (2) times a day. Max Daily Amount: 600 mg. 180 Capsule 0    MEN'S MULTI-VITAMIN PO Take  by mouth.          Allergies   Allergen Reactions    Banana Hives          PHYSICAL EXAMINATION    Visit Vitals  Pulse 79   Temp 98.2 °F (36.8 °C) (Temporal)   Ht 6' (1.829 m)   Wt 184 lb (83.5 kg)   SpO2 98%   BMI 24.95 kg/m²       CONSTITUTIONAL: NAD, A&O x 3  SENSATION: Intact to light touch throughout  RANGE OF MOTION: The patient has full passive range of motion in all four extremities. MOTOR:  Straight Leg Raise: Negative, bilateral    Ambulates with a single point cane               Hip Flex Knee Ext Knee Flex Ankle DF GTE Ankle PF Tone   Right +4/5 +4/5 +4/5 +4/5 +4/5 +4/5 +4/5   Left +4/5 +4/5 +4/5 +4/5 +4/5 +4/5 +4/5       ASSESSMENT   Diagnoses and all orders for this visit:    1. Low back pain at multiple sites    2. Lumbar neuritis    3. Lumbosacral spondylosis without myelopathy    4. DDD (degenerative disc disease), lumbar    5. Lumbar pain    6. HNP (herniated nucleus pulposus), lumbar          IMPRESSION AND PLAN:  Patient returns to the office today with c/o mild line low back pain radiating into RLE in a S1 distribution to the knee, occasional radicular sxs into the LLE. RLE pain >>LLE). Multiple treatment options were discussed. .Patient elected to proceed with blocks. I will order an epidural at L4-5. He did not require refills of Lyrica 300 mg TID. I encouraged him to continue to perform his daily HEP. Patient is neurologically intact. I will see the patient back following block or earlier if needed. Written by Ruthie Chavez, as dictated by Latrice Miller MD  I examined the patient, reviewed and agree with the note.

## 2022-06-03 ENCOUNTER — OFFICE VISIT (OUTPATIENT)
Dept: ORTHOPEDIC SURGERY | Age: 42
End: 2022-06-03
Payer: COMMERCIAL

## 2022-06-03 VITALS
WEIGHT: 184 LBS | OXYGEN SATURATION: 98 % | TEMPERATURE: 98.2 F | BODY MASS INDEX: 24.92 KG/M2 | HEART RATE: 79 BPM | HEIGHT: 72 IN

## 2022-06-03 DIAGNOSIS — M47.817 LUMBOSACRAL SPONDYLOSIS WITHOUT MYELOPATHY: ICD-10-CM

## 2022-06-03 DIAGNOSIS — M54.50 LUMBAR PAIN: ICD-10-CM

## 2022-06-03 DIAGNOSIS — M54.16 LUMBAR NEURITIS: ICD-10-CM

## 2022-06-03 DIAGNOSIS — M54.50 LOW BACK PAIN AT MULTIPLE SITES: Primary | ICD-10-CM

## 2022-06-03 DIAGNOSIS — M51.36 DDD (DEGENERATIVE DISC DISEASE), LUMBAR: ICD-10-CM

## 2022-06-03 DIAGNOSIS — M51.26 HNP (HERNIATED NUCLEUS PULPOSUS), LUMBAR: ICD-10-CM

## 2022-06-03 PROCEDURE — 99214 OFFICE O/P EST MOD 30 MIN: CPT | Performed by: PHYSICAL MEDICINE & REHABILITATION

## 2022-06-21 ENCOUNTER — TELEPHONE (OUTPATIENT)
Dept: ORTHOPEDIC SURGERY | Age: 42
End: 2022-06-21

## 2022-06-21 NOTE — TELEPHONE ENCOUNTER
RECEIVED DENIAL FOR THE LUMBAR EPI L4/5 THAT WAS ORDERED BY DR SAVAGE . PEER TO PEER REQUESTED PLEASE SEE THE REASONS DENIED ON THE FORM SCANNED UNDER /INS DENIAL AND CALL [phone] AND [employer] OPTION #4. REFERENCE # IS [id number].  Samanta Pardo

## 2022-06-21 NOTE — TELEPHONE ENCOUNTER
I called to do the P2P and it keeps telling me a need a 10 digit case number and the reference number is only 9 digits.

## 2022-06-23 NOTE — TELEPHONE ENCOUNTER
I don't know what to do then. I tried it again and it still insists on a 10 digit case number. I cannot even get through that prompt without it. Can you call the insurance to let them know the problem we are having?

## 2022-06-23 NOTE — TELEPHONE ENCOUNTER
That is the Case number ( it is on the denial letter that is scanned in. ) Time has  for the Peer to Peer

## 2022-07-05 ENCOUNTER — TELEPHONE (OUTPATIENT)
Dept: ORTHOPEDIC SURGERY | Age: 42
End: 2022-07-05

## 2022-07-05 NOTE — TELEPHONE ENCOUNTER
DAVISON AND LEFT  FOR PT TO RETURN MY CALL RE INS DENIAL FOR THE SPINAL INJ THAT DR SAVAGE ORDERED. I HAVE SCANNED THE DENIAL UNDER [address] [facility] AND IT LISTS THE REASON FOR THE DENIAL.  AT PATIENTS FU APPT W/DR SAVAGE IT CAN BE DETERMINED IF WE CAN GO FORWARD

## 2022-07-15 ENCOUNTER — HOSPITAL ENCOUNTER (EMERGENCY)
Age: 42
Discharge: HOME | End: 2022-07-15
Attending: EMERGENCY MEDICINE
Payer: COMMERCIAL

## 2022-07-15 VITALS
RESPIRATION RATE: 20 BRPM | DIASTOLIC BLOOD PRESSURE: 82 MMHG | BODY MASS INDEX: 24.38 KG/M2 | SYSTOLIC BLOOD PRESSURE: 114 MMHG | WEIGHT: 180 LBS | HEART RATE: 70 BPM | HEIGHT: 72 IN | TEMPERATURE: 98 F | OXYGEN SATURATION: 98 %

## 2022-07-15 DIAGNOSIS — M25.571 ARTHRALGIA OF BOTH ANKLES: Primary | ICD-10-CM

## 2022-07-15 DIAGNOSIS — M25.572 ARTHRALGIA OF BOTH ANKLES: Primary | ICD-10-CM

## 2022-07-15 PROCEDURE — 99283 EMERGENCY DEPT VISIT LOW MDM: CPT

## 2022-07-15 RX ORDER — CYCLOBENZAPRINE HCL 5 MG
5 TABLET ORAL 2 TIMES DAILY
Qty: 10 TABLET | Refills: 0 | Status: SHIPPED | OUTPATIENT
Start: 2022-07-15 | End: 2022-09-27 | Stop reason: ALTCHOICE

## 2022-07-15 NOTE — ED PROVIDER NOTES
EMERGENCY DEPARTMENT HISTORY AND PHYSICAL EXAM      Date: 7/15/2022  Patient Name: Harman Oliva. History of Presenting Illness     Chief Complaint   Patient presents with    Leg Pain    Ankle Pain       History Provided By: Patient    HPI: Harman Lepe, 36 y.o. male with a significant past medical history of HTN, Back arthritis presents to the ED with Pain in ankles, thighs,  For 2 weeks. Getting worse in last 1 week. Patient states it got aggravated by long walks with his daughter being oriented in college and heavy gym exercise. Patient states he donates plasma 2 times a week for the past 5 years. He denies any direct trauma. No fever, no SOB no cough no chest pain. There are no other complaints, changes, or physical findings at this time. PCP: Zuhair Matthew NP    No current facility-administered medications on file prior to encounter. Current Outpatient Medications on File Prior to Encounter   Medication Sig Dispense Refill    pregabalin (LYRICA) 300 mg capsule Take 1 Capsule by mouth two (2) times a day. Max Daily Amount: 600 mg. 180 Capsule 0    MEN'S MULTI-VITAMIN PO Take  by mouth. Past History     Past Medical History:  Past Medical History:   Diagnosis Date    Arthritis     Hypertension        Past Surgical History:  Past Surgical History:   Procedure Laterality Date    HX VASECTOMY         Family History:  Family History   Problem Relation Age of Onset    Diabetes Mother     Heart Disease Mother     Diabetes Father     Heart Disease Father     Diabetes Sister     Diabetes Paternal Aunt        Social History:  Social History     Tobacco Use    Smoking status: Former Smoker    Smokeless tobacco: Never Used   Substance Use Topics    Alcohol use: Not Currently    Drug use: Not Currently       Allergies: Allergies   Allergen Reactions    Banana Hives       Review of Systems   Review of Systems   Constitutional: Negative. HENT: Negative.     Respiratory: Negative. Cardiovascular: Negative. Gastrointestinal: Negative. Genitourinary: Negative. Musculoskeletal: Positive for arthralgias and myalgias. Neurological: Negative. All other systems reviewed and are negative. Physical Exam   Physical Exam  Vitals and nursing note reviewed. Constitutional:       Appearance: Normal appearance. HENT:      Head: Normocephalic and atraumatic. Cardiovascular:      Rate and Rhythm: Normal rate and regular rhythm. Pulses: Normal pulses. Heart sounds: Normal heart sounds. Pulmonary:      Effort: Pulmonary effort is normal.      Breath sounds: Normal breath sounds. Abdominal:      Palpations: Abdomen is soft. Tenderness: There is no abdominal tenderness. Musculoskeletal:         General: Normal range of motion. Cervical back: Normal range of motion and neck supple. Right lower leg: No edema. Left lower leg: No edema. Neurological:      General: No focal deficit present. Mental Status: He is alert and oriented to person, place, and time. Lab and Diagnostic Study Results   Labs -   No results found for this or any previous visit (from the past 12 hour(s)). Radiologic Studies -   @lastxrresult@  CT Results  (Last 48 hours)    None        CXR Results  (Last 48 hours)    None          Medical Decision Making and ED Course   - I am the first provider for this patient. I reviewed the vital signs, available nursing notes, past medical history, past surgical history, family history and social history. - Initial assessment performed. The patients presenting problems have been discussed, and they are in agreement with the care plan formulated and outlined with them. I have encouraged them to ask questions as they arise throughout their visit. Vital Signs-Reviewed the patient's vital signs.   Patient Vitals for the past 12 hrs:   Temp Pulse Resp BP SpO2   07/15/22 1438 98 °F (36.7 °C) 70 20 114/82 98 % Differential Diagnosis & Medical Decision Making Provider Note:     [facility]     ED Course:        Procedures   Performed by: Rosey Thompson MD  Procedures      Disposition   Disposition: DC- Adult Discharges: All of the diagnostic tests were reviewed and questions answered. Diagnosis, care plan and treatment options were discussed. The patient understands the instructions and will follow up as directed. The patients results have been reviewed with them. They have been counseled regarding their diagnosis. The patient verbally convey understanding and agreement of the signs, symptoms, diagnosis, treatment and prognosis and additionally agrees to follow up as recommended with their PCP in 24 - 48 hours. They also agree with the care-plan and convey that all of their questions have been answered. I have also put together some discharge instructions for them that include: 1) educational information regarding their diagnosis, 2) how to care for their diagnosis at home, as well a 3) list of reasons why they would want to return to the ED prior to their follow-up appointment, should their condition change. Discharged    DISCHARGE PLAN:  1. Current Discharge Medication List      CONTINUE these medications which have NOT CHANGED    Details   pregabalin (LYRICA) 300 mg capsule Take 1 Capsule by mouth two (2) times a day. Max Daily Amount: 600 mg. Qty: 180 Capsule, Refills: 0    Associated Diagnoses: Lumbar neuritis; Lumbosacral spondylosis without myelopathy      MEN'S MULTI-VITAMIN PO Take  by mouth. 2.   Follow-up Information     Follow up With Specialties Details Why Contact Kendell Duron NP Nurse Practitioner, Cardiovascular Disease Physician In 1 week  [address] [place] [address]  [phone]          3. Return to ED if worse   4.    Current Discharge Medication List      START taking these medications    Details   cyclobenzaprine (FLEXERIL) 5 mg tablet Take 1 Tablet by mouth two (2) times a day. Qty: 10 Tablet, Refills: 0  Start date: 7/15/2022            Remove if admitted/transferred    Diagnosis/Clinical Impression     Clinical Impression:   1. Arthralgia of both ankles        Attestations: Maria Fernanda Stubbs MD    Please note that this dictation was completed with [employer], the computer voice recognition software. Quite often unanticipated grammatical, syntax, homophones, and other interpretive errors are inadvertently transcribed by the computer software. Please disregard these errors. Please excuse any errors that have escaped final proofreading. Thank you.

## 2022-07-15 NOTE — ED TRIAGE NOTES
Patient arrives ambulatory to the ER using a cane with c/o bilateral lower leg swelling and pain. He reports that it happened once last week, subsided, but returned yesterday. He states he \"feels a tightness\" and his symptoms are worse in the left than the right. He reports that he works out at [employer] often but denies any recent trauma. He reports that he has chronic back pain and takes Lyrica.

## 2022-09-23 DIAGNOSIS — M54.16 LUMBAR NEURITIS: ICD-10-CM

## 2022-09-23 DIAGNOSIS — M47.817 LUMBOSACRAL SPONDYLOSIS WITHOUT MYELOPATHY: ICD-10-CM

## 2022-09-23 RX ORDER — PREGABALIN 300 MG/1
CAPSULE ORAL
Qty: 180 CAPSULE | Refills: 0 | OUTPATIENT
Start: 2022-09-23

## 2022-09-23 NOTE — TELEPHONE ENCOUNTER
For [facility] in place:   Recommendation Provided To:    Intervention Detail: New Rx: 1, reason: Patient Preference and Scheduled Appointment  Gap Closed?:   Intervention Accepted By:   Time Spent (min): 5

## 2022-09-23 NOTE — TELEPHONE ENCOUNTER
Patient contacted and told that he has to be seen prior to being seen. He was transferred to the  to schedule.

## 2022-09-26 NOTE — PROGRESS NOTES
[facility]  [address], [place]   Phone: [phone]  Fax: [phone]        PROGRESS NOTE      HISTORY OF PRESENT ILLNESS:  The patient is a 36 y.o. male and was seen today for follow up of right sided low back pain radiating into the RLE in a S1 distribution to about the knee. Previously seen for left sided low back pain and right buttocks pain. Pt reports a pull of the left groin, previously noted right groin pain. Previously seen for right-sided lower back and buttocks pain radiating into the anterior right thigh (right buttocks pain >>). Previously, he was seen for low back and right groin pain radiating anteromedially into the RLE to the knee. Previously, he was seen for low back pain radiating into the RLE in an L5-S1 distribution to the knee. Additionally, he endorsed paraesthesias in the inferior aspect of his bilateral feet. Initially had c/o low back pain radiating into the BLE in and L4 distribution to the knees since work injury in 2007, worse x 3 weeks. Prior to the acute exacerbation, he reports his chronic baseline level of pain 4/10. Pt reports immediate onset of pain following injury. Originally through Workers comp but they have since settled. His groin pain is increased by activities such as putting on socks and shoes and getting into and out of a car. Pt had an increase in right groin pain radiating anteromedially into the RLE to the knee. Pt treats with Toradol, with relief. Pt denies h/o spinal surgery. Pt reports he had spinal injections x 2017+. Pt underwent right SI joint injection on 8/22/19 with no relief. Pt underwent epidural L4-5 on 5/31/2021 with benefit. Pt failed to f/u after the block. Pt states he received a right hip injection on 1/4/2022 with benefit from Dr. Ethel Andersen, pt reports increase in ROM. He previously did not tolerate NEURONTIN secondary to sedation. He did not tolerate CYMBALTA secondary to hallucinations. Patient reports an episode of bladder incontinence x 7/2019. He later clarified he could not make it to the bathroom in time. Pt denies fever, weight loss, or skin changes. The patient is RHD. Note from Lina Hinton dated 12/20/08 indicating patient was seen with c/o lower back pain. Indicated he had improvement with PT. Note from Dr. Cynthia Santacruz dated 5/18/16 indicating patient was seen with c/o back and leg pain. Indicated he was seen for a second opinion for his back and leg pain. Previously followed by [facility]. Original work injury 7/27/08. Indicated he had blocks x2 in 2013, x1 in 2014, and x1 in 2015. Indicated he had relief with those injections. Note from Dr. Cynthia Santacruz dated 8/16/16 indicating patient was seen with c/o low back and leg pain. Indicated his MRI showed evidence of left sided lumbar radiculopathy. Pt underwent left sided SNRB on 4/9/15 with Dr. Vandy Merlin. Pt underwent right sided SNRB on 8/5/14 with Dr. Vandy Merlin. Pt underwent left sided L4 SNRB on 12/12/13 with Dr. Vandy Merlin. Pt underwent left sided L4 SNRB on 5/16/13 with Dr. Vandy Merlin. ER note from Dr. Aleisha Leyva dated 5/15/20 indicating patient presented for back pain radiating into the BLE (L>R), which started 1 day prior. Indicated he previously was on Neurontin, but noted he ran out x 1 year ago. He previously treated with oxycodone and acetaminophen without relief. Restarted his Neurontin at 300 mg qhs, provided him a Rx for prednisone, and treated him with a Toradol injection. Note from Dr. Reema Chow dated 10/15/2020 indicating patient was seen with c/o right hip, low back and groin pain. Pt has h/o steroid use. Pt has pain putting on shoes and socks. Mild to moderate arthritis. Ordered an MRI of the hip. Note from Dr. Reema Chow dated 1/21/2021 indicating patient was seen with c/o right-sided pain. Pt has trouble sitting. Endorses mild groin discomfort.  MRI of the right hip indicated mild to moderate arthritis and fraying of the labrum. No surgery on the hip at this time. Recommended PT and hip injection. Pt underwent bursa injection. Note from Lia Cardozoma dated 12/17/2021 indicating patient was seen with c/o right hip pain. Provided with Mobic and a intra-articular injection of the right hip. F/u in 4 weeks. Per note, right hip MRI revealed no evidence of fracture, stress fracture, or avascular necrosis involving the right hip joint. Mild-moderate right hip joint chondrosis with small foci of subchondral marrow edema. No significant joint effusion or synovitis. Mild, likely degenerative fraying anterosuperior acetabular labrum. Lumbar spine MRI dated 7/10/19 reviewed. Per report, degenerative changes in the 3 lower most lumbar levels as describes with no spinal stenosis. RLE EMG dated 8/19/2020 by Dr. Mu Myers was normal. Preliminary reading of pelvic plain films dated 10/14/2020 revealed: No acute pathology identified. Degenerative changes on the right relative to the left hip joint. RT Hip MRI dated 11/23/2020 films not independently reviewed. Per report, no evidence of fracture, stress fracture, or avascular necrosis involving the right hip joint. Mild-moderate right hip joint chondrosis with small foci of subchondral marrow edema. No significant joint effusion or synovitis. Mild, likely degenerative fraying anterosuperior acetabular labrum. Lumbar spine plain films dated 4/25/2022. 2 views: AP and lateral. Revealed: Mild disc space narrowing at L5-S1. No malalignment. No acute pathology identified. Lumbar spine MRI dated 5/6/2022 films independently reviewed. Per report, multilevel spondylosis including left posterior lateral disc protrusions with fissuring L4-L5 and L5-S1 which can be sources of pain. Addition, there is asymmetric narrowing of the left lateral recess stenosis with potential for impingement on the descending left L5 and S1 nerve roots.  Far right posterior lateral disc bulge at L3-L4 abuts the extraforaminal segment of the right L3 nerve root. Recommend correlation for corresponding radicular symptoms. No acute osseous abnormality. A RLE EMG dated 5/10/2022 by Dr. Karin Lara was within normal limits. At his last clinical appointment, patient elected to proceed with blocks. I ordered an epidural at L4-5. He did not require refills of Lyrica 300 mg BID. I encouraged him to continue to perform his daily HEP. The patient returns today and reports pain location and distribution remains unchanged. He rates his pain 3-8/10, previously 6/10. Pt reports pain has worsened since last office visits. Pt was scheduled for an epidural at L4-5 but was denied by insurance. Insurance stated there had been an inconsistency between his complaint and studies. Pt continues to take Lyrica 300 mg BID and tolerates it. Pt reports he could not tolerate PT for his back. Pt denies change in bowel or bladder habits.  reviewed. Body mass index is 24.28 kg/m².     PCP: Wes Plascencia NP      Past Medical History:   Diagnosis Date    Arthritis     Hypertension         Social History     Socioeconomic History    Marital status:      Spouse name: Not on file    Number of children: Not on file    Years of education: Not on file    Highest education level: Not on file   Occupational History    Not on file   Tobacco Use    Smoking status: Former    Smokeless tobacco: Never   Vaping Use    Vaping Use: Never used   Substance and Sexual Activity    Alcohol use: Not Currently    Drug use: Not Currently    Sexual activity: Not on file   Other Topics Concern     Service Not Asked    Blood Transfusions Not Asked    Caffeine Concern Not Asked    Occupational Exposure Not Asked    Hobby Hazards Not Asked    Sleep Concern Not Asked    Stress Concern Not Asked    Weight Concern Not Asked    Special Diet Not Asked    Back Care Not Asked    Exercise Not Asked    Bike Helmet Not Asked    Seat Belt Not Asked    Self-Exams Not Asked   Social History Narrative    Not on file     Social Determinants of Health     Financial Resource Strain: Not on file   Food Insecurity: Not on file   Transportation Needs: Not on file   Physical Activity: Not on file   Stress: Not on file   Social Connections: Not on file   Intimate Partner Violence: Not on file   Housing Stability: Not on file       Current Outpatient Medications   Medication Sig Dispense Refill    pregabalin (LYRICA) 300 mg capsule Take 1 Capsule by mouth two (2) times a day. Max Daily Amount: 600 mg. 180 Capsule 0    MEN'S MULTI-VITAMIN PO Take  by mouth. Allergies   Allergen Reactions    Banana Hives          REVIEW OF SYSTEMS  Constitutional symptoms: Negative  Eyes: Negative  Ears, Nose, Throat, and Mouth: Negative  Cardiovascular: Negative  Respiratory: Negative  Genitourinary: Negative  Integumentary (Skin and/or breast): Negative  Musculoskeletal: Positive for right sided low back pain radiating into the RLE in a S1 distribution to about the knee. Extremities: Negative for edema. Endocrine/Rheumatologic: Negative  Hematologic/Lymphatic: Negative  Allergic/Immunologic: Negative  Psychiatric: Negative     PHYSICAL EXAMINATION    Visit Vitals  Pulse 61   Temp 98.1 °F (36.7 °C)   Resp 18   Wt 179 lb (81.2 kg)   SpO2 100%   BMI 24.28 kg/m²       CONSTITUTIONAL: NAD, A&O x 3  SENSATION: . Intact to light touch throughout  RANGE OF MOTION: The patient has full passive range of motion in all four extremities. MOTOR:  Straight Leg Raise: Negative, bilateral    Ambulates with a single point cane               Hip Flex Knee Ext Knee Flex Ankle DF GTE Ankle PF Tone   Right +4/5 +4/5 +4/5 +4/5 +4/5 +4/5 +4/5   Left +4/5 +4/5 +4/5 +4/5 +4/5 +4/5 +4/5       ASSESSMENT   Diagnoses and all orders for this visit:    1. Lumbar pain    2. Lumbar neuritis    3. Lumbosacral spondylosis without myelopathy    4. Low back pain at multiple sites    5. DDD (degenerative disc disease), lumbar    6.  HNP (herniated nucleus pulposus), lumbar        IMPRESSION AND PLAN:  Patient returns to the office today with c/o right sided low back pain radiating into the RLE in a S1 distribution to about the knee. Multiple treatment options were discussed. I offered to refer him to pain management, pt declined. I will wean him off of Lyrica 300 mg BID. I will try him on Topamax 75 mg qhs. The risks, benefits, and potential side effects of this medication were discussed. Patient understands and wishes to proceed. Patient advised to call the office if intolerant to new medication. Patient is neurologically intact. I will see the patient back in 6 week's time or earlier if needed. Written by Jose Alfredo Mosley, as dictated by Greg Coon MD  I examined the patient, reviewed and agree with the note.

## 2022-09-27 ENCOUNTER — OFFICE VISIT (OUTPATIENT)
Dept: ORTHOPEDIC SURGERY | Age: 42
End: 2022-09-27
Payer: COMMERCIAL

## 2022-09-27 VITALS
WEIGHT: 179 LBS | RESPIRATION RATE: 18 BRPM | OXYGEN SATURATION: 100 % | TEMPERATURE: 98.1 F | BODY MASS INDEX: 24.28 KG/M2 | HEART RATE: 61 BPM

## 2022-09-27 DIAGNOSIS — M54.16 LUMBAR NEURITIS: ICD-10-CM

## 2022-09-27 DIAGNOSIS — M51.26 HNP (HERNIATED NUCLEUS PULPOSUS), LUMBAR: ICD-10-CM

## 2022-09-27 DIAGNOSIS — M54.50 LOW BACK PAIN AT MULTIPLE SITES: ICD-10-CM

## 2022-09-27 DIAGNOSIS — M54.50 LUMBAR PAIN: Primary | ICD-10-CM

## 2022-09-27 DIAGNOSIS — M51.36 DDD (DEGENERATIVE DISC DISEASE), LUMBAR: ICD-10-CM

## 2022-09-27 DIAGNOSIS — M47.817 LUMBOSACRAL SPONDYLOSIS WITHOUT MYELOPATHY: ICD-10-CM

## 2022-09-27 PROCEDURE — 99214 OFFICE O/P EST MOD 30 MIN: CPT | Performed by: PHYSICAL MEDICINE & REHABILITATION

## 2022-09-27 RX ORDER — TOPIRAMATE 25 MG/1
TABLET ORAL
Qty: 90 TABLET | Refills: 1 | Status: SHIPPED | OUTPATIENT
Start: 2022-09-27

## 2022-09-27 RX ORDER — PREGABALIN 150 MG/1
150 CAPSULE ORAL 2 TIMES DAILY
Qty: 36 CAPSULE | Refills: 0 | Status: SHIPPED | OUTPATIENT
Start: 2022-09-27

## 2022-09-27 NOTE — Clinical Note
9/27/2022    Patient: Lizzeth Alberts. YOB: 1980   Date of Visit: 9/27/2022     Parish Stringer NP  Via     Dear Parish Stringer NP,      Thank you for referring Mr. Dannie Bang to [address] for evaluation. My notes for this consultation are attached. If you have questions, please do not hesitate to call me. I look forward to following your patient along with you.       Sincerely,    Maxwell Rios MD

## 2022-10-13 ENCOUNTER — TELEPHONE (OUTPATIENT)
Dept: ORTHOPEDIC SURGERY | Age: 42
End: 2022-10-13

## 2022-10-13 NOTE — TELEPHONE ENCOUNTER
Patient contacted and I told him after he takes the 150mg for the next 2 nights, then he is done with the lyrica. He expressed understanding.

## 2022-10-13 NOTE — TELEPHONE ENCOUNTER
Patient stated he has been decreasing his medication (Lyrica 300mg) trying to wean himself off but only has 2 doses left but has 5 more days to take it. He stated he may have miscounted the pills or may have made a mistake and took two in one day. He's asking if he should just take 1 dose today, skip tomorrow and take the last dose Saturday. Please advise. Patient can be reached at [phone].

## 2022-12-21 DIAGNOSIS — M54.16 LUMBAR NEURITIS: ICD-10-CM

## 2022-12-21 DIAGNOSIS — M47.817 LUMBOSACRAL SPONDYLOSIS WITHOUT MYELOPATHY: ICD-10-CM

## 2022-12-21 DIAGNOSIS — M51.36 DDD (DEGENERATIVE DISC DISEASE), LUMBAR: ICD-10-CM

## 2022-12-21 DIAGNOSIS — M51.26 HNP (HERNIATED NUCLEUS PULPOSUS), LUMBAR: ICD-10-CM

## 2022-12-21 DIAGNOSIS — M54.50 LUMBAR PAIN: ICD-10-CM

## 2022-12-21 DIAGNOSIS — M54.50 LOW BACK PAIN AT MULTIPLE SITES: ICD-10-CM

## 2022-12-21 RX ORDER — TOPIRAMATE 25 MG/1
TABLET ORAL
Qty: 90 TABLET | Refills: 0 | Status: SHIPPED | OUTPATIENT
Start: 2022-12-21

## 2023-01-16 ENCOUNTER — OFFICE VISIT (OUTPATIENT)
Dept: ORTHOPEDIC SURGERY | Age: 43
End: 2023-01-16
Payer: COMMERCIAL

## 2023-01-16 VITALS
HEART RATE: 82 BPM | OXYGEN SATURATION: 99 % | BODY MASS INDEX: 23.19 KG/M2 | WEIGHT: 171 LBS | TEMPERATURE: 97.9 F | RESPIRATION RATE: 19 BRPM

## 2023-01-16 DIAGNOSIS — M51.36 DDD (DEGENERATIVE DISC DISEASE), LUMBAR: ICD-10-CM

## 2023-01-16 DIAGNOSIS — M54.50 LUMBAR PAIN: Primary | ICD-10-CM

## 2023-01-16 DIAGNOSIS — M54.16 LUMBAR NEURITIS: ICD-10-CM

## 2023-01-16 DIAGNOSIS — M47.817 LUMBOSACRAL SPONDYLOSIS WITHOUT MYELOPATHY: ICD-10-CM

## 2023-01-16 DIAGNOSIS — M51.26 HNP (HERNIATED NUCLEUS PULPOSUS), LUMBAR: ICD-10-CM

## 2023-01-16 PROCEDURE — 99214 OFFICE O/P EST MOD 30 MIN: CPT | Performed by: PHYSICAL MEDICINE & REHABILITATION

## 2023-01-16 RX ORDER — TIAGABINE HYDROCHLORIDE 2 MG/1
2 TABLET, FILM COATED ORAL 2 TIMES DAILY
Qty: 60 TABLET | Refills: 1 | Status: SHIPPED | OUTPATIENT
Start: 2023-01-16

## 2023-01-16 NOTE — Clinical Note
1/16/2023    Patient: Patel Mayen. YOB: 1980   Date of Visit: 1/16/2023     Marie Brennan NP  Via     Dear Marie Brennan NP,      Thank you for referring Mr. Toño Roblero to [address] for evaluation. My notes for this consultation are attached. If you have questions, please do not hesitate to call me. I look forward to following your patient along with you.       Sincerely,    Giovanna Araujo MD

## 2023-01-16 NOTE — PROGRESS NOTES
[facility]  [address]  [facility], [facility] [place]   Phone: [phone]  Fax: [phone]        PROGRESS NOTE      HISTORY OF PRESENT ILLNESS:  The patient is a 36 y.o. male and was seen today for follow up of  right sided low back pain radiating into the RLE in a S1 distribution to about the knee. Previously seen for left sided low back pain and right buttocks pain. Pt reports a pull of the left groin, previously noted right groin pain. Previously seen for right-sided lower back and buttocks pain radiating into the anterior right thigh (right buttocks pain >>). Previously, he was seen for low back and right groin pain radiating anteromedially into the RLE to the knee. Previously, he was seen for low back pain radiating into the RLE in an L5-S1 distribution to the knee. Additionally, he endorsed paraesthesias in the inferior aspect of his bilateral feet. Initially had c/o low back pain radiating into the BLE in and L4 distribution to the knees since work injury in 2007, worse x 3 weeks. Prior to the acute exacerbation, he reports his chronic baseline level of pain 4/10. Pt reports immediate onset of pain following injury. Originally through Workers comp but they have since settled. His groin pain is increased by activities such as putting on socks and shoes and getting into and out of a car. Pt had an increase in right groin pain radiating anteromedially into the RLE to the knee. Pt treats with Toradol, with relief. Pt denies h/o spinal surgery. Pt reports he had spinal injections x 2017+. Pt underwent right SI joint injection on 8/22/19 with no relief. Pt underwent epidural L4-5 on 5/31/2021 with benefit. Pt failed to f/u after the block. Pt states he received a right hip injection on 1/4/2022 with benefit from Dr. Jamie Zapien, pt reports increase in ROM. He previously did not tolerate NEURONTIN secondary to sedation.  He did not tolerate CYMBALTA secondary to hallucinations. Patient reports an episode of bladder incontinence x 7/2019. He later clarified he could not make it to the bathroom in time. Pt denies fever, weight loss, or skin changes. The patient is RHD. Note from Josh Meza dated 12/20/08 indicating patient was seen with c/o lower back pain. Indicated he had improvement with PT. Note from Dr. Roger Felix dated 5/18/16 indicating patient was seen with c/o back and leg pain. Indicated he was seen for a second opinion for his back and leg pain. Previously followed by [facility]. Original work injury 7/27/08. Indicated he had blocks x2 in 2013, x1 in 2014, and x1 in 2015. Indicated he had relief with those injections. Note from Dr. Roger Felix dated 8/16/16 indicating patient was seen with c/o low back and leg pain. Indicated his MRI showed evidence of left sided lumbar radiculopathy. Pt underwent left sided SNRB on 4/9/15 with Dr. Javier Neal. Pt underwent right sided SNRB on 8/5/14 with Dr. Javier Neal. Pt underwent left sided L4 SNRB on 12/12/13 with Dr. Javier Neal. Pt underwent left sided L4 SNRB on 5/16/13 with Dr. Javier Neal. ER note from Dr. Peggy Almanzar dated 5/15/20 indicating patient presented for back pain radiating into the BLE (L>R), which started 1 day prior. Indicated he previously was on Neurontin, but noted he ran out x 1 year ago. He previously treated with oxycodone and acetaminophen without relief. Restarted his Neurontin at 300 mg qhs, provided him a Rx for prednisone, and treated him with a Toradol injection. Note from Dr. Jamie Zapien dated 10/15/2020 indicating patient was seen with c/o right hip, low back and groin pain. Pt has h/o steroid use. Pt has pain putting on shoes and socks. Mild to moderate arthritis. Ordered an MRI of the hip. Note from Dr. Jamie Zapien dated 1/21/2021 indicating patient was seen with c/o right-sided pain. Pt has trouble sitting. Endorses mild groin discomfort.  MRI of the right hip indicated mild to moderate arthritis and fraying of the labrum. No surgery on the hip at this time. Recommended PT and hip injection. Pt underwent bursa injection. Note from Lynette Cardozo dated 12/17/2021 indicating patient was seen with c/o right hip pain. Provided with Mobic and a intra-articular injection of the right hip. F/u in 4 weeks. RLE EMG dated 8/19/2020 by Dr. Danielle Melendez was normal. Preliminary reading of pelvic plain films dated 10/14/2020 revealed: No acute pathology identified. Degenerative changes on the right relative to the left hip joint. RT Hip MRI dated 11/23/2020 films not independently reviewed. Per report, no evidence of fracture, stress fracture, or avascular necrosis involving the right hip joint. Mild-moderate right hip joint chondrosis with small foci of subchondral marrow edema. No significant joint effusion or synovitis. Mild, likely degenerative fraying anterosuperior acetabular labrum. Lumbar spine plain films dated 4/25/2022. 2 views: AP and lateral. Revealed: Mild disc space narrowing at L5-S1. No malalignment. No acute pathology identified. Lumbar spine MRI dated 5/6/2022 films independently reviewed. Per report, multilevel spondylosis including left posterior lateral disc protrusions with fissuring L4-L5 and L5-S1 which can be sources of pain. Addition, there is asymmetric narrowing of the left lateral recess stenosis with potential for impingement on the descending left L5 and S1 nerve roots. Far right posterior lateral disc bulge at L3-L4 abuts the extraforaminal segment of the right L3 nerve root. Recommend correlation for corresponding radicular symptoms. No acute osseous abnormality. A RLE EMG dated 5/10/2022 by Dr. Geovanni Swan was within normal limits. At his last clinical appointment, I offered to refer him to pain management, pt declined. I weaned him off of Lyrica 300 mg BID. I tried him on Topamax 75 mg qhs. Patient last seen on 9/27/2022 and failed to follow up in 6 weeks.  The patient returns today with low back pain without radiculopathy. Patient says RLE symptoms have resolved. He rates his pain 5-10/10, previously 3-8/10. Patient says he has been dragging his left foot after he got a different pair of shoes. He is compliant with his HEP. Patient failed TOPAMAX 75 mg QHS due to mood changes.  reviewed. Body mass index is 23.19 kg/m².     PCP: Yordan Lang NP      Past Medical History:   Diagnosis Date    Arthritis     Hypertension         Social History     Socioeconomic History    Marital status:      Spouse name: Not on file    Number of children: Not on file    Years of education: Not on file    Highest education level: Not on file   Occupational History    Not on file   Tobacco Use    Smoking status: Former    Smokeless tobacco: Never   Vaping Use    Vaping Use: Never used   Substance and Sexual Activity    Alcohol use: Not Currently    Drug use: Not Currently    Sexual activity: Not on file   Other Topics Concern     Service Not Asked    Blood Transfusions Not Asked    Caffeine Concern Not Asked    Occupational Exposure Not Asked    Hobby Hazards Not Asked    Sleep Concern Not Asked    Stress Concern Not Asked    Weight Concern Not Asked    Special Diet Not Asked    Back Care Not Asked    Exercise Not Asked    Bike Helmet Not Asked    Seat Belt Not Asked    Self-Exams Not Asked   Social History Narrative    Not on file     Social Determinants of Health     Financial Resource Strain: Not on file   Food Insecurity: Not on file   Transportation Needs: Not on file   Physical Activity: Not on file   Stress: Not on file   Social Connections: Not on file   Intimate Partner Violence: Not on file   Housing Stability: Not on file       Current Outpatient Medications   Medication Sig Dispense Refill    topiramate (TOPAMAX) 25 mg tablet TAKE 3 TABLETS BY MOUTH EVERY DAY AT BEDTIME 90 Tablet 0       Allergies   Allergen Reactions    Banana Hives          PHYSICAL EXAMINATION    Visit Vitals  Pulse 82   Temp 97.9 °F (36.6 °C)   Resp 19   Wt 171 lb (77.6 kg)   SpO2 99%   BMI 23.19 kg/m²       CONSTITUTIONAL: NAD, A&O x 3  SENSATION: Decreased sensation to light touch on the LLE in a L4, and decreased sensation to light touch on the RLE in a L5 distribution. Otherwise, intact to light touch throughout  MOTOR:  Straight Leg Raise: Negative, bilateral  Single leg stance: negative, bilateral  Ambulates without assistive device. Hip Flex Knee Ext Knee Flex Ankle DF GTE Ankle PF Tone   Right +4/5 +4/5 +4/5 +4/5 +4/5 +4/5 +4/5   Left +4/5 +4/5 +4/5 +4/5 +4/5 +4/5 +4/5       ASSESSMENT   Diagnoses and all orders for this visit:    1. Lumbar pain    2. Lumbar neuritis    3. Lumbosacral spondylosis without myelopathy    4. DDD (degenerative disc disease), lumbar    5. HNP (herniated nucleus pulposus), lumbar        IMPRESSION AND PLAN:  Patient returns to the office today with c/o low back pain without radiculopathy. Patient says RLE symptoms have resolved. Patient describes a left foot drop in the office, but during the physical examination I do not appreciate any LLE weakness. Multiple treatment options were discussed. I will wean the Topamax 75 mg QHS. I will try him on Gabitril 2 mg BID. The risks, benefits, and potential side effects of this medication were discussed. Patient understands and wishes to proceed. I advised them to continue to take the medication even if they do not see any relief, but to call the office if intolerant to the new medication. I advise patient resume his HEP and complete daily. Patient is neurologically intact. I will see the patient back in 6 week's time or earlier if needed. Written by Joshua Boone, as dictated by Jovi Sorensen MD  I examined the patient, reviewed and agree with the note.

## 2023-01-18 ENCOUNTER — TELEPHONE (OUTPATIENT)
Dept: ORTHOPEDIC SURGERY | Age: 43
End: 2023-01-18

## 2023-01-18 NOTE — TELEPHONE ENCOUNTER
Spoke to [facility] and they did not have the gabitril  2 mg or its generic. I was able to contact [facility] and they are able to get the generic brand for Mr. Umm Montgomery. I spoke to patient using two patient identifiers. I advised patient that the medication could be sent to [facility] and he stated that was fine. I spoke to [facility] after confirming [facility] had gabitril and [facility] pharmacist Nyla Durbin advised me she would transfer prescription to the new requested pharmacy. I provided the patient with the address to the pharmacy and advised he call the pharmacy directly to see when the medication would be ready for  .

## 2023-01-18 NOTE — TELEPHONE ENCOUNTER
Cristal Clayton from the [facility]  in [place] on [address] called and the prescription that Dr. Kallie Laws order for Toño Roblero, Alhaji Dennis, they can not get the brand or the generic for this prescription. He will need a new  script to be called in to this pharmacy.    Pharmacy #-[phone]

## 2023-01-25 ENCOUNTER — TELEPHONE (OUTPATIENT)
Dept: ORTHOPEDIC SURGERY | Age: 43
End: 2023-01-25

## 2023-01-25 DIAGNOSIS — M54.16 LUMBAR NEURITIS: ICD-10-CM

## 2023-01-25 DIAGNOSIS — M54.50 LUMBAR PAIN: Primary | ICD-10-CM

## 2023-01-25 RX ORDER — AMITRIPTYLINE HYDROCHLORIDE 50 MG/1
50 TABLET, FILM COATED ORAL
Qty: 30 TABLET | Refills: 0 | Status: SHIPPED | OUTPATIENT
Start: 2023-01-25

## 2023-01-25 NOTE — TELEPHONE ENCOUNTER
Patient states that [facility] has the brand name for the prescription gabitril but does not have the generic brand patient states that his insurance does not cover the brand name and really need some pain meds        Please call and advise patient of how to move forward   [phone]

## 2023-01-25 NOTE — TELEPHONE ENCOUNTER
Spoke to patient using two patient identifiers, advised patient that medication has been sent to the [address] [place] [address].

## 2023-01-25 NOTE — TELEPHONE ENCOUNTER
I called [facility] and they stated that Gabitril is on back order. Do you want to try him on something else?

## 2023-01-27 ENCOUNTER — HOSPITAL ENCOUNTER (EMERGENCY)
Age: 43
Discharge: HOME | End: 2023-01-27
Attending: INTERNAL MEDICINE | Admitting: INTERNAL MEDICINE
Payer: COMMERCIAL

## 2023-01-27 VITALS
SYSTOLIC BLOOD PRESSURE: 119 MMHG | BODY MASS INDEX: 22.35 KG/M2 | TEMPERATURE: 98.4 F | OXYGEN SATURATION: 99 % | WEIGHT: 165 LBS | HEIGHT: 72 IN | DIASTOLIC BLOOD PRESSURE: 72 MMHG | HEART RATE: 92 BPM | RESPIRATION RATE: 18 BRPM

## 2023-01-27 DIAGNOSIS — M54.50 ACUTE EXACERBATION OF CHRONIC LOW BACK PAIN: Primary | ICD-10-CM

## 2023-01-27 DIAGNOSIS — G89.29 ACUTE EXACERBATION OF CHRONIC LOW BACK PAIN: Primary | ICD-10-CM

## 2023-01-27 PROCEDURE — 96372 THER/PROPH/DIAG INJ SC/IM: CPT | Performed by: INTERNAL MEDICINE

## 2023-01-27 PROCEDURE — 74011250636 HC RX REV CODE- 250/636: Performed by: INTERNAL MEDICINE

## 2023-01-27 PROCEDURE — 99284 EMERGENCY DEPT VISIT MOD MDM: CPT | Performed by: INTERNAL MEDICINE

## 2023-01-27 RX ORDER — MORPHINE SULFATE 4 MG/ML
4 INJECTION, SOLUTION INTRAMUSCULAR; INTRAVENOUS
Status: COMPLETED | OUTPATIENT
Start: 2023-01-27 | End: 2023-01-27

## 2023-01-27 RX ORDER — DEXAMETHASONE SODIUM PHOSPHATE 4 MG/ML
6 INJECTION, SOLUTION INTRA-ARTICULAR; INTRALESIONAL; INTRAMUSCULAR; INTRAVENOUS; SOFT TISSUE
Status: COMPLETED | OUTPATIENT
Start: 2023-01-27 | End: 2023-01-27

## 2023-01-27 RX ORDER — PREDNISONE 20 MG/1
40 TABLET ORAL DAILY
Qty: 10 TABLET | Refills: 0 | Status: SHIPPED | OUTPATIENT
Start: 2023-01-27 | End: 2023-02-01

## 2023-01-27 RX ADMIN — DEXAMETHASONE SODIUM PHOSPHATE 6 MG: 4 INJECTION, SOLUTION INTRAMUSCULAR; INTRAVENOUS at 19:36

## 2023-01-27 RX ADMIN — MORPHINE SULFATE 4 MG: 4 INJECTION, SOLUTION INTRAMUSCULAR; INTRAVENOUS at 19:36

## 2023-01-27 NOTE — ED PROVIDER NOTES
[facility] EMERGENCY DEPT  EMERGENCY DEPARTMENT HISTORY AND PHYSICAL EXAM      Date: 1/27/2023  Patient Name: Brady Christine. MRN: [id number]  YOB: 1980  Date of evaluation: 1/27/2023  Provider: Mario Warren MD   Note Started: 6:52 PM 1/27/23    HISTORY OF PRESENT ILLNESS     Chief Complaint   Patient presents with    Back Pain       History Provided By: Patient    HPI: Brady Christine., 36 y.o. male with h/o HTN; chronic lumbar pain followed by Dr Barrie Sanchez that states that he working on his car and aggravated his chronic back pain. He has pain with every movement and he has no radiculopathy. Pain is 10/10. In the past when he has had this; he responds to a morphine injection. Walks with cane. PAST MEDICAL HISTORY   Past Medical History:  Past Medical History:   Diagnosis Date    Arthritis     Chronic back pain     Hypertension        Past Surgical History:  Past Surgical History:   Procedure Laterality Date    HX VASECTOMY         Family History:  Family History   Problem Relation Age of Onset    Diabetes Mother     Heart Disease Mother     Diabetes Father     Heart Disease Father     Diabetes Sister     Diabetes Paternal Aunt        Social History:  Social History     Tobacco Use    Smoking status: Former    Smokeless tobacco: Never   Vaping Use    Vaping Use: Never used   Substance Use Topics    Alcohol use: Not Currently    Drug use: Not Currently       Allergies: Allergies   Allergen Reactions    Banana Hives       PCP: Shelbi Finn NP    Current Meds:   Previous Medications    AMITRIPTYLINE (ELAVIL) 50 MG TABLET    Take 1 Tablet by mouth nightly. Indications: neuropathic pain       REVIEW OF SYSTEMS   Review of Systems   Constitutional:  Negative for fever. HENT:  Negative for trouble swallowing. Eyes:  Negative for visual disturbance. Respiratory:  Negative for shortness of breath. Cardiovascular:  Negative for chest pain.    Gastrointestinal:  Negative for abdominal distention and abdominal pain. Genitourinary:  Negative for flank pain. Musculoskeletal:  Positive for back pain. Skin:  Negative for rash. Neurological:  Negative for headaches. Psychiatric/Behavioral:  Negative for confusion. Positives and Pertinent negatives as per HPI. PHYSICAL EXAM     ED Triage Vitals   ED Encounter Vitals Group      BP 01/27/23 1515 97/72      Pulse (Heart Rate) 01/27/23 1515 (!) 114      Resp Rate 01/27/23 1513 16      Temp 01/27/23 1823 98.4 °F (36.9 °C)      Temp src --       O2 Sat (%) 01/27/23 1515 97 %      Weight 01/27/23 1513 165 lb      Height 01/27/23 1513 6'      Physical Exam  Vitals and nursing note reviewed. Constitutional:       Appearance: Normal appearance. He is well-developed. He is not ill-appearing. Comments: Laying very still in bed trying not to move   HENT:      Head: Normocephalic. Mouth/Throat:      Pharynx: No oropharyngeal exudate. Eyes:      Conjunctiva/sclera: Conjunctivae normal.   Cardiovascular:      Rate and Rhythm: Normal rate and regular rhythm. Heart sounds: Normal heart sounds. No murmur heard. Pulmonary:      Effort: Pulmonary effort is normal. No respiratory distress. Breath sounds: No wheezing. Abdominal:      General: Bowel sounds are normal. There is no distension. Palpations: Abdomen is soft. Tenderness: There is no abdominal tenderness. Musculoskeletal:         General: Tenderness present. Normal range of motion. Cervical back: Neck supple. Comments: TTP left SI joint; neg SLR; normal PMS   Skin:     General: Skin is warm and dry. Neurological:      Mental Status: He is alert and oriented to person, place, and time. SCREENINGS               No data recorded        LAB, EKG AND DIAGNOSTIC RESULTS   Labs:  No results found for this or any previous visit (from the past 12 hour(s)). EKG: Initial EKG interpreted by me.  Shows     Radiologic Studies:  Non-plain film images such as CT, Ultrasound and MRI are read by the radiologist. Plain radiographic images are visualized and preliminarily interpreted by the ED Provider with the below findings:    NOTE: Acute aggravation of chronic back pain. No red flags. Will give dose of IM Morphine and Decadron. Follow up with Dr Karrie Gerard. Interpretation per the Radiologist below, if available at the time of this note:  No results found. PROCEDURES   Unless otherwise noted below, none. Performed by: Margo Mendoza MD   Procedures      CRITICAL CARE TIME       ED COURSE and DIFFERENTIAL DIAGNOSIS/MDM   Vitals:    Vitals:    01/27/23 1513 01/27/23 1515 01/27/23 1823   BP:  97/72 119/72   Pulse:  (!) 114 92   Resp: 16  18   Temp:   98.4 °F (36.9 °C)   SpO2:  97% 99%   Weight: 74.8 kg (165 lb)     Height: 6' (1.829 m)          Patient was given the following medications:  Medications   morphine injection 4 mg (has no administration in time range)   dexamethasone (DECADRON) 4 mg/mL injection 6 mg (has no administration in time range)       FINAL IMPRESSION   No diagnosis found. DISPOSITION/PLAN       Discharge Note: The patient is stable for discharge home. The signs, symptoms, diagnosis, and discharge instructions have been discussed, understanding conveyed, and agreed upon. The patient is to follow up as recommended or return to ER should their symptoms worsen. PATIENT REFERRED TO:  Follow-up Information    None           DISCHARGE MEDICATIONS:  Current Discharge Medication List            DISCONTINUED MEDICATIONS:  Current Discharge Medication List          I am the Primary Clinician of Record: Margo Mendoza MD (electronically signed)    (Please note that parts of this dictation were completed with voice recognition software. Quite often unanticipated grammatical, syntax, homophones, and other interpretive errors are inadvertently transcribed by the computer software. Please disregards these errors.  Please excuse any errors that have escaped final proofreading.) no

## 2023-01-27 NOTE — ED TRIAGE NOTES
Pt states that he has chronic back pain and seen Dr David Jasso and had meds changed last week, pt c/o increased back pain today after stopping tompax

## 2023-01-28 NOTE — ED NOTES
Pt's son is  here for transportation home. I have reviewed discharge instructions with the patient. The patient verbalized understanding.

## 2023-02-07 ENCOUNTER — TELEPHONE (OUTPATIENT)
Dept: ORTHOPEDIC SURGERY | Age: 43
End: 2023-02-07

## 2023-02-07 NOTE — TELEPHONE ENCOUNTER
They are not to approve the epidural because the patient does not have any radicular pain. This is documented both in Dr. Darrius Serna note and also the ER note from January 27.

## 2023-02-07 NOTE — TELEPHONE ENCOUNTER
Patient was ordered a Lumbar Epidural L4/5 by Dr Srinivas Shrestha. Received fax denying this request, Reason is as follows:  \"Epidurals are considered for management of radiculopathy when the patient has a dermatomal  pattern of that corresponds with the level requested. And says that the clinical info does not support that. Would you please call for peer to peer ? The phone # to call is [phone] and reference case #[id number]. The pts Member Id if you need it is [id number]  I have scanned the letter in under .  Thank You

## 2023-02-08 ENCOUNTER — TELEPHONE (OUTPATIENT)
Dept: ORTHOPEDIC SURGERY | Age: 43
End: 2023-02-08

## 2023-02-08 NOTE — TELEPHONE ENCOUNTER
Patient contacted and when I questioned him about the medication and I told him that we did not tell him to take 2 tabs daily of the elavil. He stated that we gave him some directions when he was at the office and I explained to him that those directions were for the gabitril originally prescribed but since it was on back order and the pharmacy couldn't get it, we switched it to Elavil. He immediately got scared stating oh my God, he was thankful he did not overdose because he was confused and thought that was how he was supposed to take the Elavil also. He only has 4 tabs left of the elavil but the insurance company is not going to allow another RX to be filled this soon.

## 2023-02-08 NOTE — TELEPHONE ENCOUNTER
Called patient to advise of the denial of the block. Patient stated that the new medication given seems to be working.  Patient will keep his follow up with Dr Patricio Clemente on 2/27/2023

## 2023-02-08 NOTE — TELEPHONE ENCOUNTER
I called and spoke to . Bryan Ramírez. The pt was identified using 2 pt identifiers. He states that he was advised to increase his elavil to 50 mg twice a day. The last prescription was only for once a day. This is why the pt is requesting another refill. He is scheduled for a follow up at the end of February with Dr. Hien Alvarado. Please review.

## 2023-02-08 NOTE — TELEPHONE ENCOUNTER
Patient is requesting a refill on amitriptyline     Please call and advise patient at   [phone]

## 2023-02-09 NOTE — TELEPHONE ENCOUNTER
Patient is not eligible for a refill until 2-25-23. LMOVM telling patient to call me back.  I am at ext [phone]

## 2023-02-19 RX ORDER — AMITRIPTYLINE HYDROCHLORIDE 50 MG/1
TABLET, FILM COATED ORAL
Qty: 30 TABLET | Refills: 0 | Status: SHIPPED | OUTPATIENT
Start: 2023-02-19

## 2023-02-23 NOTE — PROGRESS NOTES
[facility]  [address], [address]  Phone: [phone]  Fax: [phone]        PROGRESS NOTE      HISTORY OF PRESENT ILLNESS:  The patient is a 36 y.o. male and was seen today for follow up of  right sided low back pain radiating into the RLE in a S1 distribution to about the knee. Previously seen for left sided low back pain and right buttocks pain. Pt reports a pull of the left groin, previously noted right  groin pain. Previously seen for right-sided lower back and buttocks pain radiating into the anterior right thigh (right buttocks pain >>). Previously, he was seen for low back and right groin pain radiating anteromedially into the RLE to the knee. Previously,  he was seen for low back pain radiating into the RLE in an L5-S1 distribution to the knee. Additionally, he endorsed paraesthesias in the inferior aspect of his bilateral feet. Initially had c/o  low back pain radiating into the BLE in and L4 distribution to the knees since work injury in 2007, worse x 3 weeks. Prior to the acute exacerbation,  he reports his chronic baseline level of pain 4/10. Pt reports immediate onset of pain following injury. Originally through Workers comp but they have since settled. His groin pain is increased  by activities such as putting on socks and shoes and getting into and out of a car. Pt had an increase in right groin pain radiating anteromedially into the RLE to the knee. Pt treats with Toradol,  with relief. Pt denies h/o spinal surgery. Pt reports he had spinal injections x 2017+. Pt underwent right SI joint injection on 8/22/19 with no relief. Pt underwent epidural L4-5 on 5/31/2021 with benefit. Pt failed to f/u after the block. Pt  states he received a right hip injection on 1/4/2022 with benefit from Dr. Theodore Staley, pt reports increase in ROM. He previously did not tolerate  NEURONTIN secondary to sedation.  He did not tolerate CYMBALTA  secondary to hallucinations. Patient failed TOPAMAX 75 mg QHS due to mood changes. Patient reports an episode of bladder incontinence x 7/2019. He later clarified he could not make it to the bathroom in time. Pt denies fever, weight loss, or skin changes. The patient is RHD. Note from Gold Javed dated  12/20/08 indicating patient was seen with c/o lower back pain. Indicated he had improvement with PT. Note from Dr. Chen Villanueva dated 5/18/16 indicating patient was seen with c/o back and leg pain. Indicated he was seen for a second opinion for his back and leg pain. Previously followed by [facility]. Original work  injury 7/27/08. Indicated he had blocks x2 in 2013, x1 in 2014, and x1 in 2015. Indicated he had relief with those injections. Note from Dr. Chen Villanueva dated 8/16/16 indicating patient was  seen with c/o low back and leg pain. Indicated his MRI showed evidence of left sided lumbar radiculopathy. Pt underwent left sided SNRB on 4/9/15 with Dr. Enrike Gongora. Pt underwent right sided  SNRB on 8/5/14 with Dr. Enrike Gongora. Pt underwent left sided L4 SNRB on 12/12/13 with Dr. Enrike Gongora. Pt underwent left sided L4 SNRB on 5/16/13 with Dr. Enrike Gongora. ER note from Dr. Adrian Mcfarland dated 5/15/20 indicating patient presented for back pain  radiating into the BLE (L>R), which started 1 day prior. Indicated he previously was on Neurontin, but noted he ran out x 1 year ago. He previously treated with oxycodone and acetaminophen without relief. Restarted his Neurontin at 300 mg qhs, provided him a Rx for prednisone, and treated him with a Toradol injection. Note from Dr. Zeeshan Person dated 10/15/2020 indicating patient was seen  with c/o right hip, low back and groin pain. Pt has h/o steroid use. Pt has pain putting on shoes and socks. Mild to moderate arthritis. Ordered an MRI of the hip. Note from Dr. Zeeshan Person dated  1/21/2021 indicating patient was seen with c/o right-sided pain. Pt has trouble sitting. Endorses mild groin discomfort. MRI of the right hip indicated mild to moderate arthritis and fraying of the  labrum. No surgery on the hip at this time. Recommended PT and hip injection. Pt underwent bursa injection. Note from Christiano Merlos dated 12/17/2021 indicating patient was seen with c/o right  hip pain. Provided with Mobic and a intra-articular injection of the right hip. F/u in 4 weeks. RLE EMG dated 8/19/2020 by Dr. Mar Wolf was normal. Preliminary reading of pelvic plain films  dated 10/14/2020 revealed: No acute pathology identified. Degenerative changes on the right relative to the left hip joint. RT Hip MRI  dated 11/23/2020 films not independently reviewed. Per report, no evidence of fracture, stress fracture, or avascular necrosis involving the right hip joint. Mild-moderate right hip joint chondrosis  with small foci of subchondral marrow edema. No significant joint effusion or synovitis. Mild, likely degenerative fraying anterosuperior acetabular labrum. Lumbar spine plain films  dated 4/25/2022. 2 views: AP and lateral. Revealed: Mild disc space narrowing at L5-S1. No malalignment. No acute pathology identified. Lumbar spine MRI  dated 5/6/2022 films independently reviewed. Per report, multilevel spondylosis including left posterior lateral disc protrusions with fissuring L4-L5 and L5-S1 which can be sources of pain. Addition,  there is asymmetric narrowing of the left lateral recess stenosis with potential for impingement on the descending left L5 and S1 nerve roots. Far right posterior lateral disc bulge at L3-L4 abuts the extraforaminal segment of the right L3 nerve root. Recommend correlation for corresponding  radicular symptoms. No acute osseous abnormality. A RLE EMG dated 5/10/2022 by Dr. Yaritza Elam was within normal limits. At his last clinical appointment, Patient described left foot drop symptoms in the office today, but I did not appreciate any foot drop on physical examination.  I do not appreciate any LLE weakness on physical examination. I weaned him off the Topamax 75 mg QHS. I tried him on Gabitril 2 mg BID. I recommended the patient continue to perform his HEP everyday. The patient returns today with centralized low back pain. Patient denies radicular symptoms. He rates his pain 2-10/10, previously 5-10/10. Patient says that overall his pain is better when compared to the last office visit. He is compliant with his HEP. Patient takes the Amitriptyline 50 mg QHS with some relief, 50% better. Pt denies change in bowel or bladder habits. Patient notes the morphine and dexamethasone injection at the ER relieved his pain. Patient called the office on 1/18/2023 saying he could not get the Gabitril 2 mg BID at his pharmacy so he was started on Amitriptyline. Patient called the office on 1/27/2023 saying the Amitriptyline was not helping with his pain, and also asking for an injection but it was denied by insurance due to him not having radicular symptoms. ER note from Dr. Lydia Hernandez dated 1/27/2023 indicating patient presented for chronic lumbar pain that flared up after he was working on his car. Denies radicular symptoms. Patient was given morphine injection and dexamethasone injection in the ER.  reviewed. Body mass index is 23.6 kg/m².     PCP: TAYLOR Mckay NP      Past Medical History:   Diagnosis Date    Arthritis     Chronic back pain     Hypertension        Social History     Socioeconomic History    Marital status:      Spouse name: None    Number of children: None    Years of education: None    Highest education level: None   Tobacco Use    Smoking status: Former    Smokeless tobacco: Never   Substance and Sexual Activity    Alcohol use: Not Currently    Drug use: Not Currently       Current Outpatient Medications   Medication Sig Dispense Refill    amitriptyline (ELAVIL) 50 MG tablet TAKE 1 TABLET BY MOUTH NIGHTLY FOR  NEUROPATHIC  PAIN 30 tablet 0     No current facility-administered medications for this visit. Allergies   Allergen Reactions    Banana Hives          PHYSICAL EXAMINATION    Pulse 78   Temp 98 °F (36.7 °C)   Resp 18   Wt 174 lb (78.9 kg)   SpO2 98%   BMI 23.60 kg/m²     CONSTITUTIONAL: NAD, A&O x 3  SENSATION: Decreased sensation to light touch on the RLE in a L4 and S1 distribution. Otherwise,  Sensation is intact to light touch throughout. MOTOR:  Straight Leg Raise: Negative, bilateral    Ambulates with a single point cane. Hip Flex Knee Ext Knee Flex Ankle DF GTE Ankle PF Tone   Right +4/5 +4/5 +4/5 +4/5 +4/5 +4/5 +4/5   Left +4/5 +4/5 +4/5 +4/5 +4/5 +4/5 +4/5       ASSESSMENT   Jessica Garner was seen today for back problem. Diagnoses and all orders for this visit:    Lumbar neuritis    Spondylosis without myelopathy or radiculopathy, lumbar region    DDD (degenerative disc disease), lumbar    HNP (herniated nucleus pulposus), lumbar        IMPRESSION AND PLAN:  Patient returns to the office today with c/o centralized low back pain. Multiple treatment options were discussed. I offered referring the patient to pain management, pt declined. Patient wished to continue with the current treatment plan. I refilled his Elavil 50 mg QHS. I recommended the patient continue to perform his HEP everyday. Patient is neurologically intact. I will see the patient back in 3 month's time or earlier if needed. Written by Jersey Jolley, as dictated by Lianna Cohen MD  I examined the patient, reviewed and agree with the note.

## 2023-02-24 ENCOUNTER — OFFICE VISIT (OUTPATIENT)
Age: 43
End: 2023-02-24
Payer: COMMERCIAL

## 2023-02-24 VITALS
BODY MASS INDEX: 23.6 KG/M2 | RESPIRATION RATE: 18 BRPM | TEMPERATURE: 98 F | HEART RATE: 78 BPM | OXYGEN SATURATION: 98 % | WEIGHT: 174 LBS

## 2023-02-24 DIAGNOSIS — M54.16 LUMBAR NEURITIS: Primary | ICD-10-CM

## 2023-02-24 DIAGNOSIS — M51.36 DDD (DEGENERATIVE DISC DISEASE), LUMBAR: ICD-10-CM

## 2023-02-24 DIAGNOSIS — M51.26 HNP (HERNIATED NUCLEUS PULPOSUS), LUMBAR: ICD-10-CM

## 2023-02-24 DIAGNOSIS — M47.816 SPONDYLOSIS WITHOUT MYELOPATHY OR RADICULOPATHY, LUMBAR REGION: ICD-10-CM

## 2023-02-24 PROCEDURE — 99213 OFFICE O/P EST LOW 20 MIN: CPT | Performed by: PHYSICAL MEDICINE & REHABILITATION

## 2023-02-24 RX ORDER — AMITRIPTYLINE HYDROCHLORIDE 50 MG/1
50 TABLET, FILM COATED ORAL NIGHTLY
Qty: 90 TABLET | Refills: 0 | Status: SHIPPED | OUTPATIENT
Start: 2023-02-24

## 2023-02-27 DIAGNOSIS — M51.26 HNP (HERNIATED NUCLEUS PULPOSUS), LUMBAR: ICD-10-CM

## 2023-02-27 DIAGNOSIS — M47.817 LUMBOSACRAL SPONDYLOSIS WITHOUT MYELOPATHY: ICD-10-CM

## 2023-02-27 DIAGNOSIS — M54.16 LUMBAR NEURITIS: ICD-10-CM

## 2023-02-27 DIAGNOSIS — M54.50 LUMBAR PAIN: ICD-10-CM

## 2023-02-27 DIAGNOSIS — M51.36 DDD (DEGENERATIVE DISC DISEASE), LUMBAR: ICD-10-CM

## 2023-05-24 ENCOUNTER — OFFICE VISIT (OUTPATIENT)
Age: 43
End: 2023-05-24
Payer: MEDICARE

## 2023-05-24 VITALS
SYSTOLIC BLOOD PRESSURE: 119 MMHG | TEMPERATURE: 97.1 F | OXYGEN SATURATION: 98 % | BODY MASS INDEX: 25.22 KG/M2 | DIASTOLIC BLOOD PRESSURE: 79 MMHG | HEIGHT: 72 IN | WEIGHT: 186.2 LBS | HEART RATE: 98 BPM

## 2023-05-24 DIAGNOSIS — M54.16 RADICULOPATHY, LUMBAR REGION: ICD-10-CM

## 2023-05-24 DIAGNOSIS — M51.26 HNP (HERNIATED NUCLEUS PULPOSUS), LUMBAR: Primary | ICD-10-CM

## 2023-05-24 DIAGNOSIS — M47.817 LUMBOSACRAL SPONDYLOSIS WITHOUT MYELOPATHY: ICD-10-CM

## 2023-05-24 DIAGNOSIS — M51.36 DDD (DEGENERATIVE DISC DISEASE), LUMBAR: ICD-10-CM

## 2023-05-24 DIAGNOSIS — M54.16 LUMBAR NEURITIS: ICD-10-CM

## 2023-05-24 PROCEDURE — 3074F SYST BP LT 130 MM HG: CPT | Performed by: PHYSICAL MEDICINE & REHABILITATION

## 2023-05-24 PROCEDURE — G8427 DOCREV CUR MEDS BY ELIG CLIN: HCPCS | Performed by: PHYSICAL MEDICINE & REHABILITATION

## 2023-05-24 PROCEDURE — 99214 OFFICE O/P EST MOD 30 MIN: CPT | Performed by: PHYSICAL MEDICINE & REHABILITATION

## 2023-05-24 PROCEDURE — G8419 CALC BMI OUT NRM PARAM NOF/U: HCPCS | Performed by: PHYSICAL MEDICINE & REHABILITATION

## 2023-05-24 PROCEDURE — 1036F TOBACCO NON-USER: CPT | Performed by: PHYSICAL MEDICINE & REHABILITATION

## 2023-05-24 PROCEDURE — 3078F DIAST BP <80 MM HG: CPT | Performed by: PHYSICAL MEDICINE & REHABILITATION

## 2023-05-24 RX ORDER — TIAGABINE HYDROCHLORIDE 2 MG/1
2 TABLET, FILM COATED ORAL 2 TIMES DAILY
Qty: 60 TABLET | Refills: 1 | Status: SHIPPED | OUTPATIENT
Start: 2023-05-24

## 2023-05-24 NOTE — PROGRESS NOTES
[facility]  [address], [address]   Phone: [phone]  Fax: [phone]        PROGRESS NOTE      HISTORY OF PRESENT ILLNESS:  The patient is a 36 y.o. male and was seen today for follow up of left buttocks pain radiating down the LLE in a S1 distribution to the calf. Patient denies RLE pain. Previously seen for right sided low back pain radiating into the RLE in a S1 distribution to about the knee. Previously seen for left sided low back pain and right buttocks pain. Pt reports a pull of the left groin, previously noted right  groin pain. Previously seen for right-sided lower back and buttocks pain radiating into the anterior right thigh (right buttocks pain >>). Previously, he was seen for low back and right groin pain radiating anteromedially into the RLE to the knee. Previously,  he was seen for low back pain radiating into the RLE in an L5-S1 distribution to the knee. Additionally, he endorsed paraesthesias in the inferior aspect of his bilateral feet. Initially had c/o  low back pain radiating into the BLE in and L4 distribution to the knees since work injury in 2007, worse x 3 weeks. Prior to the acute exacerbation,  he reports his chronic baseline level of pain 4/10. Pt reports immediate onset of pain following injury. Originally through Workers comp but they have since settled. His groin pain is increased  by activities such as putting on socks and shoes and getting into and out of a car. Pt had an increase in right groin pain radiating anteromedially into the RLE to the knee. Pt treats with Toradol,  with relief. Pt denies h/o spinal surgery. Pt reports he had spinal injections x 2017+. Pt underwent right SI joint injection on 8/22/19 with no relief. Pt underwent epidural L4-5 on 5/31/2021 with benefit. Pt failed to f/u after the block. Pt  states he received a right hip injection on 1/4/2022 with benefit from Dr. Elisabet Serna, pt reports increase in ROM.

## 2023-06-15 ENCOUNTER — HOSPITAL ENCOUNTER (OUTPATIENT)
Facility: HOSPITAL | Age: 43
Discharge: HOME | End: 2023-06-18
Payer: MEDICARE

## 2023-06-15 VITALS
TEMPERATURE: 98.3 F | OXYGEN SATURATION: 98 % | RESPIRATION RATE: 16 BRPM | SYSTOLIC BLOOD PRESSURE: 116 MMHG | DIASTOLIC BLOOD PRESSURE: 68 MMHG | HEART RATE: 76 BPM

## 2023-06-15 PROBLEM — M51.26 HERNIATED NUCLEUS PULPOSUS, LUMBAR: Status: ACTIVE | Noted: 2023-06-15

## 2023-06-15 PROCEDURE — 6370000000 HC RX 637 (ALT 250 FOR IP): Performed by: PHYSICAL MEDICINE & REHABILITATION

## 2023-06-15 PROCEDURE — 64484 NJX AA&/STRD TFRM EPI L/S EA: CPT

## 2023-06-15 PROCEDURE — 6360000002 HC RX W HCPCS: Performed by: PHYSICAL MEDICINE & REHABILITATION

## 2023-06-15 PROCEDURE — 64483 NJX AA&/STRD TFRM EPI L/S 1: CPT

## 2023-06-15 PROCEDURE — 2500000003 HC RX 250 WO HCPCS: Performed by: PHYSICAL MEDICINE & REHABILITATION

## 2023-06-15 PROCEDURE — 6360000004 HC RX CONTRAST MEDICATION: Performed by: PHYSICAL MEDICINE & REHABILITATION

## 2023-06-15 RX ORDER — DIAZEPAM 5 MG/1
2.5 TABLET ORAL ONCE
Status: COMPLETED | OUTPATIENT
Start: 2023-06-15 | End: 2023-06-15

## 2023-06-15 RX ORDER — DIAZEPAM 5 MG/1
10 TABLET ORAL ONCE
Status: COMPLETED | OUTPATIENT
Start: 2023-06-15 | End: 2023-06-15

## 2023-06-15 RX ORDER — LIDOCAINE HYDROCHLORIDE 10 MG/ML
30 INJECTION, SOLUTION EPIDURAL; INFILTRATION; INTRACAUDAL; PERINEURAL ONCE
Status: COMPLETED | OUTPATIENT
Start: 2023-06-15 | End: 2023-06-15

## 2023-06-15 RX ORDER — DIAZEPAM 5 MG/1
5 TABLET ORAL ONCE
Status: COMPLETED | OUTPATIENT
Start: 2023-06-15 | End: 2023-06-15

## 2023-06-15 RX ORDER — DEXAMETHASONE SODIUM PHOSPHATE 10 MG/ML
10 INJECTION, SOLUTION INTRAMUSCULAR; INTRAVENOUS ONCE
Status: COMPLETED | OUTPATIENT
Start: 2023-06-15 | End: 2023-06-15

## 2023-06-15 RX ADMIN — LIDOCAINE HYDROCHLORIDE 12 ML: 10 INJECTION, SOLUTION EPIDURAL; INFILTRATION; INTRACAUDAL; PERINEURAL at 14:07

## 2023-06-15 RX ADMIN — DEXAMETHASONE SODIUM PHOSPHATE 10 MG: 10 INJECTION INTRAMUSCULAR; INTRAVENOUS at 14:11

## 2023-06-15 RX ADMIN — DIAZEPAM 10 MG: 5 TABLET ORAL at 13:13

## 2023-06-15 RX ADMIN — IOPAMIDOL 1 ML: 408 INJECTION, SOLUTION INTRATHECAL at 14:11

## 2023-06-15 ASSESSMENT — PAIN - FUNCTIONAL ASSESSMENT: PAIN_FUNCTIONAL_ASSESSMENT: 0-10

## 2023-06-15 ASSESSMENT — PAIN SCALES - GENERAL: PAINLEVEL_OUTOF10: 3

## 2023-06-24 PROBLEM — M54.16 LUMBAR RADICULOPATHY: Status: ACTIVE | Noted: 2023-06-24

## 2023-07-05 ENCOUNTER — OFFICE VISIT (OUTPATIENT)
Age: 43
End: 2023-07-05

## 2023-07-05 VITALS
RESPIRATION RATE: 18 BRPM | TEMPERATURE: 97.6 F | OXYGEN SATURATION: 98 % | HEART RATE: 87 BPM | DIASTOLIC BLOOD PRESSURE: 73 MMHG | SYSTOLIC BLOOD PRESSURE: 112 MMHG | HEIGHT: 72 IN | BODY MASS INDEX: 25.3 KG/M2 | WEIGHT: 186.8 LBS

## 2023-07-05 DIAGNOSIS — M47.817 LUMBOSACRAL SPONDYLOSIS WITHOUT MYELOPATHY: ICD-10-CM

## 2023-07-05 DIAGNOSIS — M54.16 LUMBAR NEURITIS: Primary | ICD-10-CM

## 2023-07-05 DIAGNOSIS — M51.36 DDD (DEGENERATIVE DISC DISEASE), LUMBAR: ICD-10-CM

## 2023-07-05 DIAGNOSIS — M51.26 HNP (HERNIATED NUCLEUS PULPOSUS), LUMBAR: ICD-10-CM

## 2023-07-05 PROCEDURE — 3078F DIAST BP <80 MM HG: CPT | Performed by: PHYSICAL MEDICINE & REHABILITATION

## 2023-07-05 PROCEDURE — 99214 OFFICE O/P EST MOD 30 MIN: CPT | Performed by: PHYSICAL MEDICINE & REHABILITATION

## 2023-07-05 PROCEDURE — 3074F SYST BP LT 130 MM HG: CPT | Performed by: PHYSICAL MEDICINE & REHABILITATION

## 2023-07-05 RX ORDER — TIAGABINE HYDROCHLORIDE 4 MG/1
4 TABLET, FILM COATED ORAL 2 TIMES DAILY
Qty: 60 TABLET | Refills: 1 | Status: SHIPPED | OUTPATIENT
Start: 2023-07-05

## 2023-07-05 NOTE — PROGRESS NOTES
[facility]  [address]  [address]  [place], [place]  Tel: [phone]  Fax: [phone]          PROGRESS NOTE      HISTORY OF PRESENT ILLNESS:  The patient is a 39 y.o. male and was seen today for follow up of left buttocks pain radiating down the LLE in a S1 distribution to the calf. Patient denies RLE pain. Previously seen for right sided low back pain radiating into the RLE in a S1 distribution to about the knee. Previously seen for left sided low back pain and right buttocks pain. Pt reports a pull of the left groin, previously noted right  groin pain. Previously seen for right-sided lower back and buttocks pain radiating into the anterior right thigh (right buttocks pain >>). Previously, he was seen for low back and right groin pain radiating anteromedially into the RLE to the knee. Previously,  he was seen for low back pain radiating into the RLE in an L5-S1 distribution to the knee. Additionally, he endorsed paraesthesias in the inferior aspect of his bilateral feet. Initially had c/o  low back pain radiating into the BLE in and L4 distribution to the knees since work injury in 2007, worse x 3 weeks. Prior to the acute exacerbation,  he reports his chronic baseline level of pain 4/10. Pt reports immediate onset of pain following injury. Originally through Workers comp but they have since settled. His groin pain is increased  by activities such as putting on socks and shoes and getting into and out of a car. Pt had an increase in right groin pain radiating anteromedially into the RLE to the knee. Pt treats with Toradol,  with relief. Pt denies h/o spinal surgery. Pt reports he had spinal injections x 2017+. Pt underwent right SI joint injection on 8/22/19 with no relief. Pt underwent epidural L4-5 on 5/31/2021 with benefit. Pt failed to f/u after the block.   Pt  states he received a right hip injection on 1/4/2022 with benefit from Dr. Ozzie Richmond, pt reports

## 2023-07-25 DIAGNOSIS — M54.16 LUMBAR NEURITIS: ICD-10-CM

## 2023-07-25 DIAGNOSIS — M47.817 LUMBOSACRAL SPONDYLOSIS WITHOUT MYELOPATHY: ICD-10-CM

## 2023-07-25 DIAGNOSIS — M51.36 DDD (DEGENERATIVE DISC DISEASE), LUMBAR: ICD-10-CM

## 2023-07-25 DIAGNOSIS — M54.16 RADICULOPATHY, LUMBAR REGION: ICD-10-CM

## 2023-07-25 DIAGNOSIS — M51.26 HNP (HERNIATED NUCLEUS PULPOSUS), LUMBAR: ICD-10-CM

## 2023-07-25 RX ORDER — TIAGABINE HYDROCHLORIDE 2 MG/1
TABLET, FILM COATED ORAL
Qty: 60 TABLET | Refills: 0 | OUTPATIENT
Start: 2023-07-25

## 2023-08-24 ENCOUNTER — OFFICE VISIT (OUTPATIENT)
Age: 43
End: 2023-08-24
Payer: MEDICARE

## 2023-08-24 VITALS
DIASTOLIC BLOOD PRESSURE: 71 MMHG | OXYGEN SATURATION: 98 % | WEIGHT: 196.2 LBS | SYSTOLIC BLOOD PRESSURE: 117 MMHG | HEART RATE: 60 BPM | BODY MASS INDEX: 26.57 KG/M2 | HEIGHT: 72 IN | TEMPERATURE: 97 F

## 2023-08-24 DIAGNOSIS — M54.16 LUMBAR NEURITIS: ICD-10-CM

## 2023-08-24 PROCEDURE — 95886 MUSC TEST DONE W/N TEST COMP: CPT | Performed by: PHYSICAL MEDICINE & REHABILITATION

## 2023-08-24 PROCEDURE — 95909 NRV CNDJ TST 5-6 STUDIES: CPT | Performed by: PHYSICAL MEDICINE & REHABILITATION

## 2023-08-24 NOTE — PROGRESS NOTES
Muscle Nerve Root Ins Act Fibs Psw Fascics Other Amp Dur Poly Recrt Activation Comment Misc   Left Vastus Med Femoral L2-L4 Nml Nml Nml Nml 0 Nml Nml 0 Nml Nml     Left Tib Anterior Fibular,  Deep Fibula. .. L4-L5 Nml Nml Nml Nml 0 Nml Nml 0 Nml Nml     Left Tib Posterior Tibial L5-S1 Nml Nml Nml Nml 0 Nml Nml 0 Nml Nml     Left Gastroc MH Tibial S1-S2 Nml Nml Nml Nml 0 Nml Nml 0 Nml Nml     Left Lumbo Parasp (Upper) Rami L1-L2 Nml Nml Nml Nml 0          Left Lumbo Parasp (Mid) Rami L3-L4 Nml Nml Nml Nml 0          Left Lumbo Parasp (Lower) Rami L5-S1 Nml Nml Nml Nml 0          Left Ext Cope Long Fibular,  Deep Fibula. .. L5-S1 Nml Nml Nml Nml 0 Nml Nml 0 Nml Nml             Waveforms:    Motor         F-Wave       Sensory               [facility]  OFFICE PROCEDURE PROGRESS NOTE        Chart reviewed for the following:   Lisa HAMILTON, have reviewed the History, Physical and updated the Allergic reactions for [address] performed immediately prior to start of procedure:   Lisa HAMILTON, have performed the following reviews on Beth Luis. prior to the start of the procedure:            * Patient was identified by name and date of birth   * Agreement on procedure being performed was verified  * Risks and Benefits explained to the patient  * Procedure site verified and marked as necessary  * Patient was positioned for comfort  * Consent was signed and verified     Time: 2:36 PM     Date of procedure: 8/24/2023    Procedure performed by:  Arsen Murphy MD    Provider accompanied by: Brain. Patient accompanied by: Family member.     How tolerated by patient: tolerated the procedure well with no complications    Post Procedural Pain Scale: 0 - No Hurt    Comments: none    Written by Syd Love as dictated by Lisa Kamara MD

## 2023-08-30 ENCOUNTER — OFFICE VISIT (OUTPATIENT)
Age: 43
End: 2023-08-30
Payer: MEDICARE

## 2023-08-30 VITALS
TEMPERATURE: 98 F | HEART RATE: 78 BPM | OXYGEN SATURATION: 98 % | WEIGHT: 200 LBS | RESPIRATION RATE: 18 BRPM | BODY MASS INDEX: 27.12 KG/M2

## 2023-08-30 DIAGNOSIS — M47.817 LUMBOSACRAL SPONDYLOSIS WITHOUT MYELOPATHY: ICD-10-CM

## 2023-08-30 DIAGNOSIS — M51.36 DDD (DEGENERATIVE DISC DISEASE), LUMBAR: ICD-10-CM

## 2023-08-30 DIAGNOSIS — M54.16 LUMBAR NEURITIS: Primary | ICD-10-CM

## 2023-08-30 DIAGNOSIS — M51.26 HNP (HERNIATED NUCLEUS PULPOSUS), LUMBAR: ICD-10-CM

## 2023-08-30 PROCEDURE — G8427 DOCREV CUR MEDS BY ELIG CLIN: HCPCS | Performed by: PHYSICAL MEDICINE & REHABILITATION

## 2023-08-30 PROCEDURE — 99213 OFFICE O/P EST LOW 20 MIN: CPT | Performed by: PHYSICAL MEDICINE & REHABILITATION

## 2023-08-30 PROCEDURE — G8419 CALC BMI OUT NRM PARAM NOF/U: HCPCS | Performed by: PHYSICAL MEDICINE & REHABILITATION

## 2023-08-30 PROCEDURE — 1036F TOBACCO NON-USER: CPT | Performed by: PHYSICAL MEDICINE & REHABILITATION

## 2023-08-30 NOTE — PROGRESS NOTES
[facility]  [address]  [address]  [place], [place]  Tel: [phone]  Fax: [phone]          PROGRESS NOTE      HISTORY OF PRESENT ILLNESS:  The patient is a 39 y.o. male and was seen today for follow up of left buttocks pain radiating down the LLE in a S1 distribution to the calf. Patient denies RLE pain. Previously seen for right sided low back pain radiating into the RLE in a S1 distribution to about the knee. Previously seen for left sided low back pain and right buttocks pain. Pt reports a pull of the left groin, previously noted right  groin pain. Previously seen for right-sided lower back and buttocks pain radiating into the anterior right thigh (right buttocks pain >>). Previously, he was seen for low back and right groin pain radiating anteromedially into the RLE to the knee. Previously,  he was seen for low back pain radiating into the RLE in an L5-S1 distribution to the knee. Additionally, he endorsed paraesthesias in the inferior aspect of his bilateral feet. Initially had c/o  low back pain radiating into the BLE in and L4 distribution to the knees since work injury in 2007, worse x 3 weeks. Prior to the acute exacerbation,  he reports his chronic baseline level of pain 4/10. Pt reports immediate onset of pain following injury. Originally through Workers comp but they have since settled. His groin pain is increased  by activities such as putting on socks and shoes and getting into and out of a car. Pt had an increase in right groin pain radiating anteromedially into the RLE to the knee. Pt treats with Toradol,  with relief. Pt denies h/o spinal surgery. Pt reports he had spinal injections x 2017+. Pt underwent right SI joint injection on 8/22/19 with no relief. Pt underwent epidural L4-5 on 5/31/2021 with benefit. Pt failed to f/u after the block.   Pt  states he received a right hip injection on 1/4/2022 with benefit from Dr. Sheri Lombard, pt reports

## 2023-09-05 DIAGNOSIS — M54.16 LUMBAR NEURITIS: ICD-10-CM

## 2023-09-05 DIAGNOSIS — M51.36 DDD (DEGENERATIVE DISC DISEASE), LUMBAR: ICD-10-CM

## 2023-09-05 DIAGNOSIS — M47.817 LUMBOSACRAL SPONDYLOSIS WITHOUT MYELOPATHY: ICD-10-CM

## 2023-09-05 DIAGNOSIS — M51.26 HNP (HERNIATED NUCLEUS PULPOSUS), LUMBAR: ICD-10-CM

## 2023-09-05 RX ORDER — TIAGABINE HYDROCHLORIDE 4 MG/1
TABLET, FILM COATED ORAL
Qty: 60 TABLET | Refills: 0 | Status: SHIPPED | OUTPATIENT
Start: 2023-09-05

## 2023-09-27 ENCOUNTER — OFFICE VISIT (OUTPATIENT)
Age: 43
End: 2023-09-27
Payer: MEDICARE

## 2023-09-27 VITALS
TEMPERATURE: 97.5 F | HEART RATE: 78 BPM | SYSTOLIC BLOOD PRESSURE: 110 MMHG | HEIGHT: 72 IN | DIASTOLIC BLOOD PRESSURE: 73 MMHG | WEIGHT: 198.6 LBS | BODY MASS INDEX: 26.9 KG/M2 | OXYGEN SATURATION: 98 %

## 2023-09-27 DIAGNOSIS — M47.817 LUMBOSACRAL SPONDYLOSIS WITHOUT MYELOPATHY: ICD-10-CM

## 2023-09-27 DIAGNOSIS — M51.26 HNP (HERNIATED NUCLEUS PULPOSUS), LUMBAR: ICD-10-CM

## 2023-09-27 DIAGNOSIS — M54.16 LUMBAR NEURITIS: Primary | ICD-10-CM

## 2023-09-27 DIAGNOSIS — M51.36 DDD (DEGENERATIVE DISC DISEASE), LUMBAR: ICD-10-CM

## 2023-09-27 PROCEDURE — 3078F DIAST BP <80 MM HG: CPT | Performed by: PHYSICAL MEDICINE & REHABILITATION

## 2023-09-27 PROCEDURE — 3074F SYST BP LT 130 MM HG: CPT | Performed by: PHYSICAL MEDICINE & REHABILITATION

## 2023-09-27 PROCEDURE — G8419 CALC BMI OUT NRM PARAM NOF/U: HCPCS | Performed by: PHYSICAL MEDICINE & REHABILITATION

## 2023-09-27 PROCEDURE — G8427 DOCREV CUR MEDS BY ELIG CLIN: HCPCS | Performed by: PHYSICAL MEDICINE & REHABILITATION

## 2023-09-27 PROCEDURE — 99214 OFFICE O/P EST MOD 30 MIN: CPT | Performed by: PHYSICAL MEDICINE & REHABILITATION

## 2023-09-27 PROCEDURE — 1036F TOBACCO NON-USER: CPT | Performed by: PHYSICAL MEDICINE & REHABILITATION

## 2023-09-27 RX ORDER — TIAGABINE HYDROCHLORIDE 4 MG/1
4 TABLET, FILM COATED ORAL 3 TIMES DAILY
Qty: 90 TABLET | Refills: 1 | Status: SHIPPED | OUTPATIENT
Start: 2023-09-27

## 2023-09-27 NOTE — PROGRESS NOTES
[facility]  [address]  [address]  [place], [place]  Tel: [phone]  Fax: [phone]          PROGRESS NOTE      HISTORY OF PRESENT ILLNESS:  The patient is a 39 y.o. male and was seen today for follow up of left buttocks pain radiating down the LLE in a S1 distribution to the calf. Patient denies RLE pain. Previously seen for right sided low back pain radiating into the RLE in a S1 distribution to about the knee. Previously seen for left sided low back pain and right buttocks pain. Pt reports a pull of the left groin, previously noted right  groin pain. Previously seen for right-sided lower back and buttocks pain radiating into the anterior right thigh (right buttocks pain >>). Previously, he was seen for low back and right groin pain radiating anteromedially into the RLE to the knee. Previously,  he was seen for low back pain radiating into the RLE in an L5-S1 distribution to the knee. Additionally, he endorsed paraesthesias in the inferior aspect of his bilateral feet. Initially had c/o  low back pain radiating into the BLE in and L4 distribution to the knees since work injury in 2007, worse x 3 weeks. Prior to the acute exacerbation,  he reports his chronic baseline level of pain 4/10. Pt reports immediate onset of pain following injury. Originally through Workers comp but they have since settled. His groin pain is increased  by activities such as putting on socks and shoes and getting into and out of a car. Pt had an increase in right groin pain radiating anteromedially into the RLE to the knee. Pt treats with Toradol,  with relief. Pt denies h/o spinal surgery. Pt reports he had spinal injections x 2017+. Pt underwent right SI joint injection on 8/22/19 with no relief. Pt underwent epidural L4-5 on 5/31/2021 with benefit. Pt failed to f/u after the block.   Pt  states he received a right hip injection on 1/4/2022 with benefit from Dr. Areli Fitzpatrick, pt reports

## 2023-09-30 ENCOUNTER — HOSPITAL ENCOUNTER (EMERGENCY)
Age: 43
Discharge: HOME | End: 2023-09-30
Attending: FAMILY MEDICINE
Payer: MEDICARE

## 2023-09-30 VITALS
HEART RATE: 87 BPM | DIASTOLIC BLOOD PRESSURE: 76 MMHG | RESPIRATION RATE: 16 BRPM | OXYGEN SATURATION: 98 % | TEMPERATURE: 98 F | SYSTOLIC BLOOD PRESSURE: 126 MMHG | BODY MASS INDEX: 26.82 KG/M2 | WEIGHT: 198 LBS | HEIGHT: 72 IN

## 2023-09-30 DIAGNOSIS — G89.29 CHRONIC MIDLINE LOW BACK PAIN WITHOUT SCIATICA: Primary | ICD-10-CM

## 2023-09-30 DIAGNOSIS — M54.50 CHRONIC MIDLINE LOW BACK PAIN WITHOUT SCIATICA: Primary | ICD-10-CM

## 2023-09-30 PROCEDURE — 6360000002 HC RX W HCPCS: Performed by: FAMILY MEDICINE

## 2023-09-30 PROCEDURE — 6370000000 HC RX 637 (ALT 250 FOR IP): Performed by: FAMILY MEDICINE

## 2023-09-30 PROCEDURE — 96372 THER/PROPH/DIAG INJ SC/IM: CPT

## 2023-09-30 PROCEDURE — 99284 EMERGENCY DEPT VISIT MOD MDM: CPT

## 2023-09-30 RX ORDER — CYCLOBENZAPRINE HCL 10 MG
10 TABLET ORAL 2 TIMES DAILY PRN
Qty: 10 TABLET | Refills: 0 | Status: SHIPPED | OUTPATIENT
Start: 2023-09-30

## 2023-09-30 RX ORDER — PREDNISONE 20 MG/1
20 TABLET ORAL DAILY
Qty: 5 TABLET | Refills: 0 | Status: SHIPPED | OUTPATIENT
Start: 2023-10-01 | End: 2023-10-06

## 2023-09-30 RX ORDER — ONDANSETRON 4 MG/1
4 TABLET, ORALLY DISINTEGRATING ORAL
Status: COMPLETED | OUTPATIENT
Start: 2023-09-30 | End: 2023-09-30

## 2023-09-30 RX ORDER — PREDNISONE 20 MG/1
20 TABLET ORAL DAILY
Status: DISCONTINUED | OUTPATIENT
Start: 2023-09-30 | End: 2023-09-30 | Stop reason: HOSPADM

## 2023-09-30 RX ORDER — MORPHINE SULFATE 4 MG/ML
4 INJECTION, SOLUTION INTRAMUSCULAR; INTRAVENOUS
Status: COMPLETED | OUTPATIENT
Start: 2023-09-30 | End: 2023-09-30

## 2023-09-30 RX ADMIN — ONDANSETRON 4 MG: 4 TABLET, ORALLY DISINTEGRATING ORAL at 12:55

## 2023-09-30 RX ADMIN — PREDNISONE 20 MG: 20 TABLET ORAL at 12:55

## 2023-09-30 RX ADMIN — MORPHINE SULFATE 4 MG: 4 INJECTION, SOLUTION INTRAMUSCULAR; INTRAVENOUS at 12:55

## 2023-09-30 ASSESSMENT — ENCOUNTER SYMPTOMS: BACK PAIN: 1

## 2023-09-30 ASSESSMENT — PAIN DESCRIPTION - ORIENTATION: ORIENTATION: LOWER

## 2023-09-30 ASSESSMENT — PAIN - FUNCTIONAL ASSESSMENT: PAIN_FUNCTIONAL_ASSESSMENT: 0-10

## 2023-09-30 ASSESSMENT — PAIN DESCRIPTION - LOCATION: LOCATION: BACK

## 2023-09-30 ASSESSMENT — PAIN SCALES - GENERAL: PAINLEVEL_OUTOF10: 10

## 2023-09-30 NOTE — DISCHARGE INSTRUCTIONS
As you spoke, I have written a prescription for some muscle relaxers as well as some steroids. Next dose of steroids will be tomorrow. Regarding the Flexeril. Use this sparingly. As you can become tired while taking this especially while taking the the other muscle relaxers. Do not drive or work while taking this medication. Just use this for muscle spasms. I have given you the name of Dr. Bolivar Huynh an orthopedic doctor in [facility] in case you are not able to make any headway with a solution to your back pain. .  Follow-up with your primary care doctor or Dr. [place] Seller for any additional pain medicines if needed.

## 2023-09-30 NOTE — ED TRIAGE NOTES
Pt suffers from chronic back pain, states he was seen by orthopedics this week, states they increased his dose of gabapentin but it is not helping  Pt states \"I need morphine or dilaudid, that's what they normally give me, that foreign doctor\"

## 2023-10-09 ENCOUNTER — HOSPITAL ENCOUNTER (OUTPATIENT)
Age: 43
Discharge: HOME | End: 2023-10-12
Payer: MEDICARE

## 2023-10-09 DIAGNOSIS — M51.26 HNP (HERNIATED NUCLEUS PULPOSUS), LUMBAR: ICD-10-CM

## 2023-10-09 DIAGNOSIS — M51.36 DDD (DEGENERATIVE DISC DISEASE), LUMBAR: ICD-10-CM

## 2023-10-09 DIAGNOSIS — M54.16 LUMBAR NEURITIS: ICD-10-CM

## 2023-10-09 DIAGNOSIS — M47.817 LUMBOSACRAL SPONDYLOSIS WITHOUT MYELOPATHY: ICD-10-CM

## 2023-10-09 PROCEDURE — 72148 MRI LUMBAR SPINE W/O DYE: CPT

## 2023-11-01 ENCOUNTER — OFFICE VISIT (OUTPATIENT)
Age: 43
End: 2023-11-01
Payer: MEDICARE

## 2023-11-01 VITALS
TEMPERATURE: 97.9 F | HEIGHT: 72 IN | DIASTOLIC BLOOD PRESSURE: 84 MMHG | WEIGHT: 203.4 LBS | SYSTOLIC BLOOD PRESSURE: 117 MMHG | BODY MASS INDEX: 27.55 KG/M2 | HEART RATE: 66 BPM | OXYGEN SATURATION: 98 %

## 2023-11-01 DIAGNOSIS — M51.36 DDD (DEGENERATIVE DISC DISEASE), LUMBAR: ICD-10-CM

## 2023-11-01 DIAGNOSIS — M54.16 LUMBAR NEURITIS: ICD-10-CM

## 2023-11-01 DIAGNOSIS — M51.26 HNP (HERNIATED NUCLEUS PULPOSUS), LUMBAR: Primary | ICD-10-CM

## 2023-11-01 PROCEDURE — 1036F TOBACCO NON-USER: CPT | Performed by: PHYSICAL MEDICINE & REHABILITATION

## 2023-11-01 PROCEDURE — 3074F SYST BP LT 130 MM HG: CPT | Performed by: PHYSICAL MEDICINE & REHABILITATION

## 2023-11-01 PROCEDURE — G8419 CALC BMI OUT NRM PARAM NOF/U: HCPCS | Performed by: PHYSICAL MEDICINE & REHABILITATION

## 2023-11-01 PROCEDURE — G8484 FLU IMMUNIZE NO ADMIN: HCPCS | Performed by: PHYSICAL MEDICINE & REHABILITATION

## 2023-11-01 PROCEDURE — 3079F DIAST BP 80-89 MM HG: CPT | Performed by: PHYSICAL MEDICINE & REHABILITATION

## 2023-11-01 PROCEDURE — G8427 DOCREV CUR MEDS BY ELIG CLIN: HCPCS | Performed by: PHYSICAL MEDICINE & REHABILITATION

## 2023-11-01 PROCEDURE — 99214 OFFICE O/P EST MOD 30 MIN: CPT | Performed by: PHYSICAL MEDICINE & REHABILITATION

## 2023-11-01 RX ORDER — TIAGABINE HYDROCHLORIDE 4 MG/1
8 TABLET, FILM COATED ORAL 2 TIMES DAILY
Qty: 120 TABLET | Refills: 1 | Status: SHIPPED | OUTPATIENT
Start: 2023-11-01

## 2023-11-01 NOTE — PROGRESS NOTES
[facility]  [address]  [address]  [place], [place]  Tel: [phone]  Fax: [phone]          PROGRESS NOTE      HISTORY OF PRESENT ILLNESS:  The patient is a 39 y.o. male and was seen today for follow up of left buttocks pain radiating to the LLE in a S1 distribution to the knee. Previously seen for left buttocks pain radiating down the LLE in a S1 distribution to the calf. Patient denies RLE pain. Previously seen for right sided low back pain radiating into the RLE in a S1 distribution to about the knee. Previously seen for left sided low back pain and right buttocks pain. Pt reports a pull of the left groin, previously noted right  groin pain. Previously seen for right-sided lower back and buttocks pain radiating into the anterior right thigh (right buttocks pain >>). Previously, he was seen for low back and right groin pain radiating anteromedially into the RLE to the knee. Previously,  he was seen for low back pain radiating into the RLE in an L5-S1 distribution to the knee. Additionally, he endorsed paraesthesias in the inferior aspect of his bilateral feet. Initially had c/o  low back pain radiating into the BLE in and L4 distribution to the knees since work injury in 2007, worse x 3 weeks. Prior to the acute exacerbation,  he reports his chronic baseline level of pain 4/10. Pt reports immediate onset of pain following injury. Originally through Workers comp but they have since settled. His groin pain is increased  by activities such as putting on socks and shoes and getting into and out of a car. Pt had an increase in right groin pain radiating anteromedially into the RLE to the knee. Pt treats with Toradol,  with relief. Pt denies h/o spinal surgery. Pt reports he had spinal injections x 2017+. Pt underwent right SI joint injection on 8/22/19 with no relief. Pt underwent epidural L4-5 on 5/31/2021 with benefit. Pt failed to f/u after the block.   Pt

## 2023-11-27 DIAGNOSIS — M47.817 LUMBOSACRAL SPONDYLOSIS WITHOUT MYELOPATHY: ICD-10-CM

## 2023-11-27 DIAGNOSIS — M51.36 DDD (DEGENERATIVE DISC DISEASE), LUMBAR: ICD-10-CM

## 2023-11-27 DIAGNOSIS — M51.26 HNP (HERNIATED NUCLEUS PULPOSUS), LUMBAR: ICD-10-CM

## 2023-11-27 DIAGNOSIS — M54.16 LUMBAR NEURITIS: ICD-10-CM

## 2023-11-28 RX ORDER — TIAGABINE HYDROCHLORIDE 4 MG/1
4 TABLET, FILM COATED ORAL 3 TIMES DAILY
Qty: 90 TABLET | Refills: 0 | OUTPATIENT
Start: 2023-11-28

## 2023-12-04 ENCOUNTER — HOSPITAL ENCOUNTER (OUTPATIENT)
Age: 43
Discharge: HOME | End: 2023-12-07
Payer: MEDICARE

## 2023-12-04 DIAGNOSIS — Z01.812 PRE-OPERATIVE LABORATORY EXAMINATION: ICD-10-CM

## 2023-12-04 LAB
ALBUMIN SERPL-MCNC: 3.5 G/DL (ref 3.4–5)
ALBUMIN/GLOB SERPL: 0.9 (ref 0.8–1.7)
ALP SERPL-CCNC: 73 U/L (ref 45–117)
ALT SERPL-CCNC: 33 U/L (ref 16–61)
ANION GAP SERPL CALC-SCNC: 4 MMOL/L (ref 3–18)
AST SERPL W P-5'-P-CCNC: 24 U/L (ref 10–38)
BASOPHILS # BLD: 0 K/UL (ref 0–0.1)
BASOPHILS NFR BLD: 0 % (ref 0–2)
BILIRUB SERPL-MCNC: 0.2 MG/DL (ref 0.2–1)
BUN SERPL-MCNC: 12 MG/DL (ref 7–18)
BUN/CREAT SERPL: 13 (ref 12–20)
CA-I BLD-MCNC: 8.7 MG/DL (ref 8.5–10.1)
CHLORIDE SERPL-SCNC: 109 MMOL/L (ref 100–111)
CO2 SERPL-SCNC: 30 MMOL/L (ref 21–32)
CREAT SERPL-MCNC: 0.94 MG/DL (ref 0.6–1.3)
DIFFERENTIAL METHOD BLD: ABNORMAL
EKG ATRIAL RATE: 63 BPM
EKG DIAGNOSIS: NORMAL
EKG P AXIS: 63 DEGREES
EKG P-R INTERVAL: 134 MS
EKG Q-T INTERVAL: 376 MS
EKG QRS DURATION: 94 MS
EKG QTC CALCULATION (BAZETT): 384 MS
EKG R AXIS: 60 DEGREES
EKG T AXIS: 42 DEGREES
EKG VENTRICULAR RATE: 63 BPM
EOSINOPHIL # BLD: 0.2 K/UL (ref 0–0.4)
EOSINOPHIL NFR BLD: 2 % (ref 0–5)
ERYTHROCYTE [DISTWIDTH] IN BLOOD BY AUTOMATED COUNT: 14.2 % (ref 11.6–14.5)
GLOBULIN SER CALC-MCNC: 4.1 G/DL (ref 2–4)
GLUCOSE SERPL-MCNC: 92 MG/DL (ref 74–99)
HCT VFR BLD AUTO: 41.9 % (ref 36–48)
HGB BLD-MCNC: 13 G/DL (ref 13–16)
IMM GRANULOCYTES # BLD AUTO: 0 K/UL (ref 0–0.04)
IMM GRANULOCYTES NFR BLD AUTO: 1 % (ref 0–0.5)
LYMPHOCYTES # BLD: 4 K/UL (ref 0.9–3.6)
LYMPHOCYTES NFR BLD: 46 % (ref 21–52)
MCH RBC QN AUTO: 27.7 PG (ref 24–34)
MCHC RBC AUTO-ENTMCNC: 31 G/DL (ref 31–37)
MCV RBC AUTO: 89.3 FL (ref 78–100)
MONOCYTES # BLD: 0.8 K/UL (ref 0.05–1.2)
MONOCYTES NFR BLD: 9 % (ref 3–10)
NEUTS SEG # BLD: 3.7 K/UL (ref 1.8–8)
NEUTS SEG NFR BLD: 42 % (ref 40–73)
NRBC # BLD: 0 K/UL (ref 0–0.01)
NRBC BLD-RTO: 0 PER 100 WBC
PLATELET # BLD AUTO: 199 K/UL (ref 135–420)
PMV BLD AUTO: 9.9 FL (ref 9.2–11.8)
POTASSIUM SERPL-SCNC: 3.8 MMOL/L (ref 3.5–5.5)
PROT SERPL-MCNC: 7.6 G/DL (ref 6.4–8.2)
RBC # BLD AUTO: 4.69 M/UL (ref 4.35–5.65)
SODIUM SERPL-SCNC: 143 MMOL/L (ref 136–145)
WBC # BLD AUTO: 8.8 K/UL (ref 4.6–13.2)

## 2023-12-04 PROCEDURE — 80053 COMPREHEN METABOLIC PANEL: CPT

## 2023-12-04 PROCEDURE — 36415 COLL VENOUS BLD VENIPUNCTURE: CPT

## 2023-12-04 PROCEDURE — 85025 COMPLETE CBC W/AUTO DIFF WBC: CPT

## 2023-12-04 PROCEDURE — 93005 ELECTROCARDIOGRAM TRACING: CPT

## 2023-12-13 ENCOUNTER — OFFICE VISIT (OUTPATIENT)
Age: 43
End: 2023-12-13
Payer: MEDICARE

## 2023-12-13 VITALS
OXYGEN SATURATION: 97 % | TEMPERATURE: 97.5 F | DIASTOLIC BLOOD PRESSURE: 80 MMHG | HEART RATE: 69 BPM | HEIGHT: 72 IN | SYSTOLIC BLOOD PRESSURE: 130 MMHG | WEIGHT: 201 LBS | BODY MASS INDEX: 27.22 KG/M2

## 2023-12-13 DIAGNOSIS — M54.16 LUMBAR NEURITIS: Primary | ICD-10-CM

## 2023-12-13 DIAGNOSIS — M51.26 HNP (HERNIATED NUCLEUS PULPOSUS), LUMBAR: ICD-10-CM

## 2023-12-13 DIAGNOSIS — M51.36 DDD (DEGENERATIVE DISC DISEASE), LUMBAR: ICD-10-CM

## 2023-12-13 DIAGNOSIS — M47.817 LUMBOSACRAL SPONDYLOSIS WITHOUT MYELOPATHY: ICD-10-CM

## 2023-12-13 PROCEDURE — G8484 FLU IMMUNIZE NO ADMIN: HCPCS | Performed by: PHYSICAL MEDICINE & REHABILITATION

## 2023-12-13 PROCEDURE — 3079F DIAST BP 80-89 MM HG: CPT | Performed by: PHYSICAL MEDICINE & REHABILITATION

## 2023-12-13 PROCEDURE — G8427 DOCREV CUR MEDS BY ELIG CLIN: HCPCS | Performed by: PHYSICAL MEDICINE & REHABILITATION

## 2023-12-13 PROCEDURE — 99213 OFFICE O/P EST LOW 20 MIN: CPT | Performed by: PHYSICAL MEDICINE & REHABILITATION

## 2023-12-13 PROCEDURE — 1036F TOBACCO NON-USER: CPT | Performed by: PHYSICAL MEDICINE & REHABILITATION

## 2023-12-13 PROCEDURE — 3075F SYST BP GE 130 - 139MM HG: CPT | Performed by: PHYSICAL MEDICINE & REHABILITATION

## 2023-12-13 PROCEDURE — G8419 CALC BMI OUT NRM PARAM NOF/U: HCPCS | Performed by: PHYSICAL MEDICINE & REHABILITATION

## 2023-12-18 NOTE — PROGRESS NOTES
Instructions for your surgery at [facility]      Today's Date:  12/18/2023      Patient's Name:  Sky North Jr.           Surgery Date:  01/15/2023              Please enter the main entrance of the hospital and check-in at the  located in the lobby. Once checked in at the , you will take the elevators to the second floor, and report to the waiting room on the left. The room will say Procedure Registration.    Do NOT eat or drink anything, including candy, gum, or ice chips after midnight prior to your surgery, unless you have specific instructions from your surgeon or anesthesia provider to do so.  Brush your teeth before coming to the hospital. You may swish with water, but do not swallow.  No smoking/Vaping/E-Cigarettes 24 hours prior to the day of surgery.  No alcohol 24 hours prior to the day of surgery.  No recreational drugs for one week prior to the day of surgery.  Bring Photo ID, Insurance information, and Co-pay if required on day of surgery.  Bring in pertinent legal documents, such as, Medical Power of , DNR, Advance Directive, etc.  Leave all valuables, including money/purse, at home.  Remove all jewelry, including ALL body piercings, nail polish, acrylic nails, and makeup (including mascara); no lotions, powders, deodorant, or perfume/cologne/after shave on the skin.  Follow instruction for Hibiclens washes and CHG wipes from surgeon's office.   Glasses and dentures may be worn to the hospital. They must be removed prior to surgery. Please bring case/container for glasses or dentures.   Contact lenses should not be worn on day of surgery.   Call your doctor's office if symptoms of a cold or illness develop within 24-48 hours prior to your surgery.  Call your doctor's office if you have any questions concerning insurance or co-pays.  15. AN ADULT (relative or friend 18 years or older) MUST DRIVE YOU HOME AFTER YOUR SURGERY.  16. Please make

## 2024-01-12 ENCOUNTER — TELEPHONE (OUTPATIENT)
Facility: HOSPITAL | Age: 44
End: 2024-01-12

## 2024-01-12 ENCOUNTER — ANESTHESIA EVENT (OUTPATIENT)
Facility: HOSPITAL | Age: 44
End: 2024-01-12
Payer: MEDICARE

## 2024-01-12 NOTE — H&P
tiaGABine (GABITRIL) 4 MG tablet Take 1 tablet by mouth 3 times daily (Patient not taking: Reported on 12/13/2023) 90 tablet 1       ROS:  Denies chills, fever,night sweats,  bowel or bladder dysfunction, unexplained weight loss/weight gain, chest pain, sob or anxiety.    Physical Examination    Gen: Well developed, well nourished 43 y.o. male with positive SLR on the left. Good strength of his ehl, ta, hams and quads. No clonus, no hoffmans, no babinski.     Assessment and Plan    Due to the pt's persistent symptoms unrelieved by conservative measure Sky Mary Anne Oleary is being admitted to undergo surgical intervention. The post-operative plan of care consists of physical therapy, home health and a 2 week f/u office visit.  The risks, benefits, complications and alternatives to surgery have been discussed in detail with the patient.  The patient understands and agrees to proceed.

## 2024-01-12 NOTE — TELEPHONE ENCOUNTER
Call placed to patient, ID verified x 2. Patient  has decided with their surgeon to have lumbar surgery  to decrease  pain and improve mobility . Topics discussed included surgery preparation, what to expect the day of surgery, medications, physical and occupational therapy, and discharge planning.  It was discussed that this is considered an elective surgery and that prior to the surgery  decisions such as arranging for help at home once they are discharged needs to be made.Patient agreed to get home ready for surgery and to have a ride arranged to go home. He identifies his friend as his support system, has a walker at home and will be spending his first night in the hospital. He lives in a one story home without any steps to enter.  Instructions were given for CHG bathing. Patient states that he will shower with dial soap.  Patient will complete the procedure the morning of surgery.  Patient was reminded not to apply any deoderant, or lotions to skin the morning of surgery. He will remain NPO after midnight the night before surgery . He will take only the medications as instructed to take by his surgeon the morning of surgery with a sip of water. Patient asked repeatedly if he could have coffee the morning of surgery. Patient instructed that he is not to have food or drink after midnight for his safety under anesthesia.  Patient verbalized that he is currently only taking tylenol. He states that he is very nervous and he is going to need something to calm his nerves. Patient instructed to call Dr. Tejeda clinic to request this. Patient states that he can't right now as he is on his way to [place]. Reassurance provided that  does these surgeries often and that we will take very good care of him.  A spine surgery education book will be provided to patient post op prior to discharge. Education regarding the importance of early and frequent ambulation to avoid surgical

## 2024-01-15 ENCOUNTER — ANESTHESIA (OUTPATIENT)
Facility: HOSPITAL | Age: 44
End: 2024-01-15
Payer: MEDICARE

## 2024-01-15 ENCOUNTER — HOSPITAL ENCOUNTER (OUTPATIENT)
Facility: HOSPITAL | Age: 44
Setting detail: OUTPATIENT SURGERY
Discharge: HOME | End: 2024-01-15
Attending: ORTHOPAEDIC SURGERY | Admitting: ORTHOPAEDIC SURGERY
Payer: MEDICARE

## 2024-01-15 ENCOUNTER — APPOINTMENT (OUTPATIENT)
Facility: HOSPITAL | Age: 44
End: 2024-01-15
Attending: ORTHOPAEDIC SURGERY
Payer: MEDICARE

## 2024-01-15 VITALS
DIASTOLIC BLOOD PRESSURE: 77 MMHG | HEIGHT: 72 IN | WEIGHT: 208 LBS | RESPIRATION RATE: 15 BRPM | SYSTOLIC BLOOD PRESSURE: 138 MMHG | HEART RATE: 59 BPM | TEMPERATURE: 97.9 F | OXYGEN SATURATION: 100 % | BODY MASS INDEX: 28.17 KG/M2

## 2024-01-15 DIAGNOSIS — Z98.890 S/P LUMBAR LAMINECTOMY: Primary | ICD-10-CM

## 2024-01-15 LAB
AMPHET UR QL SCN: NEGATIVE
BARBITURATES UR QL SCN: NEGATIVE
BENZODIAZ UR QL: NEGATIVE
CANNABINOIDS UR QL SCN: NEGATIVE
COCAINE UR QL SCN: NEGATIVE
Lab: NORMAL
METHADONE UR QL: NEGATIVE
OPIATES UR QL: NEGATIVE
PCP UR QL: NEGATIVE

## 2024-01-15 PROCEDURE — 6370000000 HC RX 637 (ALT 250 FOR IP): Performed by: ORTHOPAEDIC SURGERY

## 2024-01-15 PROCEDURE — 2580000003 HC RX 258: Performed by: NURSE ANESTHETIST, CERTIFIED REGISTERED

## 2024-01-15 PROCEDURE — 6360000002 HC RX W HCPCS: Performed by: NURSE ANESTHETIST, CERTIFIED REGISTERED

## 2024-01-15 PROCEDURE — 7100000010 HC PHASE II RECOVERY - FIRST 15 MIN: Performed by: ORTHOPAEDIC SURGERY

## 2024-01-15 PROCEDURE — 3700000001 HC ADD 15 MINUTES (ANESTHESIA): Performed by: ORTHOPAEDIC SURGERY

## 2024-01-15 PROCEDURE — 2500000003 HC RX 250 WO HCPCS: Performed by: NURSE ANESTHETIST, CERTIFIED REGISTERED

## 2024-01-15 PROCEDURE — 6360000002 HC RX W HCPCS: Performed by: ANESTHESIOLOGY

## 2024-01-15 PROCEDURE — 6370000000 HC RX 637 (ALT 250 FOR IP): Performed by: NURSE ANESTHETIST, CERTIFIED REGISTERED

## 2024-01-15 PROCEDURE — 6360000002 HC RX W HCPCS: Performed by: ORTHOPAEDIC SURGERY

## 2024-01-15 PROCEDURE — 2709999900 HC NON-CHARGEABLE SUPPLY: Performed by: ORTHOPAEDIC SURGERY

## 2024-01-15 PROCEDURE — 2580000003 HC RX 258: Performed by: PHYSICIAN ASSISTANT

## 2024-01-15 PROCEDURE — 7100000000 HC PACU RECOVERY - FIRST 15 MIN: Performed by: ORTHOPAEDIC SURGERY

## 2024-01-15 PROCEDURE — 3600000002 HC SURGERY LEVEL 2 BASE: Performed by: ORTHOPAEDIC SURGERY

## 2024-01-15 PROCEDURE — 3700000000 HC ANESTHESIA ATTENDED CARE: Performed by: ORTHOPAEDIC SURGERY

## 2024-01-15 PROCEDURE — 2720000010 HC SURG SUPPLY STERILE: Performed by: ORTHOPAEDIC SURGERY

## 2024-01-15 PROCEDURE — 3600000012 HC SURGERY LEVEL 2 ADDTL 15MIN: Performed by: ORTHOPAEDIC SURGERY

## 2024-01-15 PROCEDURE — 80307 DRUG TEST PRSMV CHEM ANLYZR: CPT

## 2024-01-15 PROCEDURE — 7100000001 HC PACU RECOVERY - ADDTL 15 MIN: Performed by: ORTHOPAEDIC SURGERY

## 2024-01-15 PROCEDURE — 6360000002 HC RX W HCPCS: Performed by: PHYSICIAN ASSISTANT

## 2024-01-15 PROCEDURE — 2500000003 HC RX 250 WO HCPCS: Performed by: ORTHOPAEDIC SURGERY

## 2024-01-15 PROCEDURE — 2580000003 HC RX 258: Performed by: ORTHOPAEDIC SURGERY

## 2024-01-15 PROCEDURE — 7100000011 HC PHASE II RECOVERY - ADDTL 15 MIN: Performed by: ORTHOPAEDIC SURGERY

## 2024-01-15 RX ORDER — TAMSULOSIN HYDROCHLORIDE 0.4 MG/1
0.4 CAPSULE ORAL ONCE
Status: COMPLETED | OUTPATIENT
Start: 2024-01-15 | End: 2024-01-15

## 2024-01-15 RX ORDER — OXYCODONE HYDROCHLORIDE 5 MG/1
5 TABLET ORAL ONCE
Status: DISCONTINUED | OUTPATIENT
Start: 2024-01-15 | End: 2024-01-15 | Stop reason: HOSPADM

## 2024-01-15 RX ORDER — ROCURONIUM BROMIDE 10 MG/ML
INJECTION, SOLUTION INTRAVENOUS PRN
Status: DISCONTINUED | OUTPATIENT
Start: 2024-01-15 | End: 2024-01-15 | Stop reason: SDUPTHER

## 2024-01-15 RX ORDER — CELECOXIB 100 MG/1
200 CAPSULE ORAL ONCE
Status: COMPLETED | OUTPATIENT
Start: 2024-01-15 | End: 2024-01-15

## 2024-01-15 RX ORDER — FENTANYL CITRATE 50 UG/ML
50 INJECTION, SOLUTION INTRAMUSCULAR; INTRAVENOUS EVERY 5 MIN PRN
Status: DISCONTINUED | OUTPATIENT
Start: 2024-01-15 | End: 2024-01-15 | Stop reason: HOSPADM

## 2024-01-15 RX ORDER — LIDOCAINE HYDROCHLORIDE 20 MG/ML
INJECTION, SOLUTION EPIDURAL; INFILTRATION; INTRACAUDAL; PERINEURAL PRN
Status: DISCONTINUED | OUTPATIENT
Start: 2024-01-15 | End: 2024-01-15 | Stop reason: SDUPTHER

## 2024-01-15 RX ORDER — EPHEDRINE SULFATE/0.9% NACL/PF 50 MG/5 ML
SYRINGE (ML) INTRAVENOUS PRN
Status: DISCONTINUED | OUTPATIENT
Start: 2024-01-15 | End: 2024-01-15 | Stop reason: SDUPTHER

## 2024-01-15 RX ORDER — OXYCODONE HYDROCHLORIDE 5 MG/1
5 TABLET ORAL EVERY 6 HOURS PRN
Qty: 28 TABLET | Refills: 0 | Status: SHIPPED | OUTPATIENT
Start: 2024-01-15 | End: 2024-01-22

## 2024-01-15 RX ORDER — LIDOCAINE HYDROCHLORIDE 10 MG/ML
1 INJECTION, SOLUTION EPIDURAL; INFILTRATION; INTRACAUDAL; PERINEURAL
Status: DISCONTINUED | OUTPATIENT
Start: 2024-01-15 | End: 2024-01-15 | Stop reason: HOSPADM

## 2024-01-15 RX ORDER — FAMOTIDINE 20 MG/1
20 TABLET, FILM COATED ORAL ONCE
Status: COMPLETED | OUTPATIENT
Start: 2024-01-15 | End: 2024-01-15

## 2024-01-15 RX ORDER — MIDAZOLAM HYDROCHLORIDE 1 MG/ML
INJECTION INTRAMUSCULAR; INTRAVENOUS PRN
Status: DISCONTINUED | OUTPATIENT
Start: 2024-01-15 | End: 2024-01-15 | Stop reason: SDUPTHER

## 2024-01-15 RX ORDER — GLYCOPYRROLATE 0.2 MG/ML
INJECTION INTRAMUSCULAR; INTRAVENOUS PRN
Status: DISCONTINUED | OUTPATIENT
Start: 2024-01-15 | End: 2024-01-15 | Stop reason: SDUPTHER

## 2024-01-15 RX ORDER — ACETAMINOPHEN 500 MG
1000 TABLET ORAL ONCE
Status: DISCONTINUED | OUTPATIENT
Start: 2024-01-15 | End: 2024-01-15 | Stop reason: HOSPADM

## 2024-01-15 RX ORDER — SODIUM CHLORIDE, SODIUM LACTATE, POTASSIUM CHLORIDE, CALCIUM CHLORIDE 600; 310; 30; 20 MG/100ML; MG/100ML; MG/100ML; MG/100ML
INJECTION, SOLUTION INTRAVENOUS CONTINUOUS
Status: DISCONTINUED | OUTPATIENT
Start: 2024-01-15 | End: 2024-01-15 | Stop reason: HOSPADM

## 2024-01-15 RX ORDER — PREGABALIN 75 MG/1
75 CAPSULE ORAL ONCE
Status: COMPLETED | OUTPATIENT
Start: 2024-01-15 | End: 2024-01-15

## 2024-01-15 RX ORDER — PHENYLEPHRINE HCL IN 0.9% NACL 1 MG/10 ML
SYRINGE (ML) INTRAVENOUS PRN
Status: DISCONTINUED | OUTPATIENT
Start: 2024-01-15 | End: 2024-01-15 | Stop reason: SDUPTHER

## 2024-01-15 RX ORDER — ONDANSETRON 2 MG/ML
INJECTION INTRAMUSCULAR; INTRAVENOUS PRN
Status: DISCONTINUED | OUTPATIENT
Start: 2024-01-15 | End: 2024-01-15 | Stop reason: SDUPTHER

## 2024-01-15 RX ORDER — DEXAMETHASONE SODIUM PHOSPHATE 4 MG/ML
INJECTION, SOLUTION INTRA-ARTICULAR; INTRALESIONAL; INTRAMUSCULAR; INTRAVENOUS; SOFT TISSUE PRN
Status: DISCONTINUED | OUTPATIENT
Start: 2024-01-15 | End: 2024-01-15 | Stop reason: SDUPTHER

## 2024-01-15 RX ORDER — PROPOFOL 10 MG/ML
INJECTION, EMULSION INTRAVENOUS PRN
Status: DISCONTINUED | OUTPATIENT
Start: 2024-01-15 | End: 2024-01-15 | Stop reason: SDUPTHER

## 2024-01-15 RX ORDER — PREGABALIN 75 MG/1
75 CAPSULE ORAL ONCE
Status: DISCONTINUED | OUTPATIENT
Start: 2024-01-15 | End: 2024-01-15 | Stop reason: SDUPTHER

## 2024-01-15 RX ORDER — MIDAZOLAM HYDROCHLORIDE 2 MG/2ML
2 INJECTION, SOLUTION INTRAMUSCULAR; INTRAVENOUS ONCE
Status: COMPLETED | OUTPATIENT
Start: 2024-01-15 | End: 2024-01-15

## 2024-01-15 RX ORDER — KETOROLAC TROMETHAMINE 15 MG/ML
INJECTION, SOLUTION INTRAMUSCULAR; INTRAVENOUS PRN
Status: DISCONTINUED | OUTPATIENT
Start: 2024-01-15 | End: 2024-01-15 | Stop reason: SDUPTHER

## 2024-01-15 RX ORDER — BUPIVACAINE HYDROCHLORIDE 5 MG/ML
INJECTION, SOLUTION EPIDURAL; INTRACAUDAL PRN
Status: DISCONTINUED | OUTPATIENT
Start: 2024-01-15 | End: 2024-01-15 | Stop reason: ALTCHOICE

## 2024-01-15 RX ORDER — ONDANSETRON 2 MG/ML
4 INJECTION INTRAMUSCULAR; INTRAVENOUS
Status: COMPLETED | OUTPATIENT
Start: 2024-01-15 | End: 2024-01-15

## 2024-01-15 RX ORDER — FENTANYL CITRATE 50 UG/ML
INJECTION, SOLUTION INTRAMUSCULAR; INTRAVENOUS PRN
Status: DISCONTINUED | OUTPATIENT
Start: 2024-01-15 | End: 2024-01-15 | Stop reason: SDUPTHER

## 2024-01-15 RX ORDER — ACETAMINOPHEN 500 MG
1000 TABLET ORAL ONCE
Status: COMPLETED | OUTPATIENT
Start: 2024-01-15 | End: 2024-01-15

## 2024-01-15 RX ORDER — VANCOMYCIN HYDROCHLORIDE 1 G/20ML
INJECTION, POWDER, LYOPHILIZED, FOR SOLUTION INTRAVENOUS PRN
Status: DISCONTINUED | OUTPATIENT
Start: 2024-01-15 | End: 2024-01-15 | Stop reason: ALTCHOICE

## 2024-01-15 RX ORDER — SUCCINYLCHOLINE/SOD CL,ISO/PF 100 MG/5ML
SYRINGE (ML) INTRAVENOUS PRN
Status: DISCONTINUED | OUTPATIENT
Start: 2024-01-15 | End: 2024-01-15 | Stop reason: SDUPTHER

## 2024-01-15 RX ORDER — NEOSTIGMINE METHYLSULFATE 1 MG/ML
INJECTION, SOLUTION INTRAVENOUS PRN
Status: DISCONTINUED | OUTPATIENT
Start: 2024-01-15 | End: 2024-01-15 | Stop reason: SDUPTHER

## 2024-01-15 RX ADMIN — GLYCOPYRROLATE 0.4 MG: 0.2 INJECTION INTRAMUSCULAR; INTRAVENOUS at 14:16

## 2024-01-15 RX ADMIN — Medication 50 MCG: at 13:55

## 2024-01-15 RX ADMIN — DEXMEDETOMIDINE HYDROCHLORIDE 6 MCG: 100 INJECTION, SOLUTION INTRAVENOUS at 13:37

## 2024-01-15 RX ADMIN — TRANEXAMIC ACID 1000 MG: 100 INJECTION, SOLUTION INTRAVENOUS at 13:35

## 2024-01-15 RX ADMIN — ROCURONIUM BROMIDE 5 MG: 10 INJECTION, SOLUTION INTRAVENOUS at 13:13

## 2024-01-15 RX ADMIN — HYDROMORPHONE HYDROCHLORIDE 0.5 MG: 1 INJECTION, SOLUTION INTRAMUSCULAR; INTRAVENOUS; SUBCUTANEOUS at 15:07

## 2024-01-15 RX ADMIN — ONDANSETRON 4 MG: 2 INJECTION INTRAMUSCULAR; INTRAVENOUS at 15:04

## 2024-01-15 RX ADMIN — Medication 5 MG: at 13:55

## 2024-01-15 RX ADMIN — FENTANYL CITRATE 50 MCG: 50 INJECTION INTRAMUSCULAR; INTRAVENOUS at 13:13

## 2024-01-15 RX ADMIN — PROPOFOL 60 MG: 10 INJECTION, EMULSION INTRAVENOUS at 13:14

## 2024-01-15 RX ADMIN — SODIUM CHLORIDE, SODIUM LACTATE, POTASSIUM CHLORIDE, AND CALCIUM CHLORIDE: 600; 310; 30; 20 INJECTION, SOLUTION INTRAVENOUS at 11:09

## 2024-01-15 RX ADMIN — Medication 5 MG: at 13:44

## 2024-01-15 RX ADMIN — FENTANYL CITRATE 50 MCG: 50 INJECTION INTRAMUSCULAR; INTRAVENOUS at 13:21

## 2024-01-15 RX ADMIN — ONDANSETRON 4 MG: 2 INJECTION INTRAMUSCULAR; INTRAVENOUS at 14:11

## 2024-01-15 RX ADMIN — Medication 100 MG: at 13:13

## 2024-01-15 RX ADMIN — MIDAZOLAM 2 MG: 1 INJECTION, SOLUTION INTRAMUSCULAR; INTRAVENOUS at 13:09

## 2024-01-15 RX ADMIN — DEXAMETHASONE SODIUM PHOSPHATE 10 MG: 4 INJECTION INTRA-ARTICULAR; INTRALESIONAL; INTRAMUSCULAR; INTRAVENOUS; SOFT TISSUE at 13:33

## 2024-01-15 RX ADMIN — MIDAZOLAM 2 MG: 1 INJECTION INTRAMUSCULAR; INTRAVENOUS at 11:27

## 2024-01-15 RX ADMIN — PROPOFOL 200 MG: 10 INJECTION, EMULSION INTRAVENOUS at 13:13

## 2024-01-15 RX ADMIN — ACETAMINOPHEN 1000 MG: 500 TABLET ORAL at 11:10

## 2024-01-15 RX ADMIN — WATER 2000 MG: 1 INJECTION, SOLUTION INTRAMUSCULAR; INTRAVENOUS; SUBCUTANEOUS at 13:26

## 2024-01-15 RX ADMIN — CELECOXIB 200 MG: 100 CAPSULE ORAL at 11:10

## 2024-01-15 RX ADMIN — FAMOTIDINE 20 MG: 20 TABLET ORAL at 11:10

## 2024-01-15 RX ADMIN — ROCURONIUM BROMIDE 5 MG: 10 INJECTION, SOLUTION INTRAVENOUS at 13:52

## 2024-01-15 RX ADMIN — TRANEXAMIC ACID 1000 MG: 100 INJECTION, SOLUTION INTRAVENOUS at 14:09

## 2024-01-15 RX ADMIN — SODIUM CHLORIDE, SODIUM LACTATE, POTASSIUM CHLORIDE, AND CALCIUM CHLORIDE: 600; 310; 30; 20 INJECTION, SOLUTION INTRAVENOUS at 13:11

## 2024-01-15 RX ADMIN — PREGABALIN 75 MG: 75 CAPSULE ORAL at 11:10

## 2024-01-15 RX ADMIN — ROCURONIUM BROMIDE 15 MG: 10 INJECTION, SOLUTION INTRAVENOUS at 13:21

## 2024-01-15 RX ADMIN — KETOROLAC TROMETHAMINE 15 MG: 15 INJECTION, SOLUTION INTRAMUSCULAR; INTRAVENOUS at 14:12

## 2024-01-15 RX ADMIN — LIDOCAINE HYDROCHLORIDE 100 MG: 20 INJECTION, SOLUTION EPIDURAL; INFILTRATION; INTRACAUDAL; PERINEURAL at 13:13

## 2024-01-15 RX ADMIN — ROCURONIUM BROMIDE 10 MG: 10 INJECTION, SOLUTION INTRAVENOUS at 13:27

## 2024-01-15 RX ADMIN — Medication 3 MG: at 14:16

## 2024-01-15 RX ADMIN — TAMSULOSIN HYDROCHLORIDE 0.4 MG: 0.4 CAPSULE ORAL at 11:10

## 2024-01-15 ASSESSMENT — PAIN SCALES - GENERAL
PAINLEVEL_OUTOF10: 0
PAINLEVEL_OUTOF10: 2
PAINLEVEL_OUTOF10: 7
PAINLEVEL_OUTOF10: 4

## 2024-01-15 ASSESSMENT — PAIN - FUNCTIONAL ASSESSMENT
PAIN_FUNCTIONAL_ASSESSMENT: ACTIVITIES ARE NOT PREVENTED
PAIN_FUNCTIONAL_ASSESSMENT: ACTIVITIES ARE NOT PREVENTED
PAIN_FUNCTIONAL_ASSESSMENT: 0-10

## 2024-01-15 ASSESSMENT — PAIN DESCRIPTION - DESCRIPTORS
DESCRIPTORS: STABBING
DESCRIPTORS: ACHING;SORE

## 2024-01-15 ASSESSMENT — PAIN DESCRIPTION - ORIENTATION
ORIENTATION: LEFT;MID
ORIENTATION: MID

## 2024-01-15 ASSESSMENT — PAIN DESCRIPTION - PAIN TYPE
TYPE: ACUTE PAIN;SURGICAL PAIN
TYPE: ACUTE PAIN;SURGICAL PAIN

## 2024-01-15 ASSESSMENT — PAIN DESCRIPTION - LOCATION
LOCATION: BACK;FOOT
LOCATION: BACK

## 2024-01-15 NOTE — ANESTHESIA POSTPROCEDURE EVALUATION
Department of Anesthesiology  Postprocedure Note    Patient: Sky North Jr.  MRN: [id number]  YOB: 1980  Date of evaluation: 1/15/2024    Procedure Summary       Date: 01/15/24 Room / Location: [facility] MAIN 06 / [facility] MAIN OR    Anesthesia Start: 1309 Anesthesia Stop: 1446    Procedure: LEFT LUMBAR FOUR/FIVE, LUMBAR FIVE/SACRAL ONE LAMINECTOMY DISCECTOMY (Left: Spine Lumbar) Diagnosis:       HNP (herniated nucleus pulposus), lumbar      (HNP (herniated nucleus pulposus), lumbar [M51.26])    Surgeons: Ino Tejeda MD Responsible Provider: José Montoya MD    Anesthesia Type: General ASA Status: 2            Anesthesia Type: General    Yesi Phase I: Yesi Score: 10    Yesi Phase II:      Anesthesia Post Evaluation    Patient location during evaluation: bedside  Patient participation: complete - patient participated  Airway patency: patent  Cardiovascular status: hemodynamically stable  Respiratory status: acceptable  Hydration status: stable    No notable events documented.

## 2024-01-15 NOTE — INTERVAL H&P NOTE
Update History & Physical    The patient's History and Physical of January 12, 2024 was reviewed with the patient and I examined the patient. There was no change. The surgical site was confirmed by the patient and me.     Plan: The risks, benefits, expected outcome, and alternative to the recommended procedure have been discussed with the patient. Patient understands and wants to proceed with the procedure.     Electronically signed by RUBEN NICHOLSON MD on 1/15/2024 at 10:40 AM

## 2024-01-15 NOTE — BRIEF OP NOTE
Brief Postoperative Note      Patient: Sky North Jr.  YOB: 1980  MRN: [id number]    Date of Procedure: 1/15/2024    Pre-Op Diagnosis Codes:     * HNP (herniated nucleus pulposus), lumbar [M51.26]    Post-Op Diagnosis: Same       Procedure(s):  LEFT LUMBAR FOUR/FIVE, LUMBAR FIVE/SACRAL ONE LAMINECTOMY DISCECTOMY    Surgeon(s):  Ruben Tejeda MD    Assistant:  Surgical Assistant: Randall Lowery  Physician Assistant: Yulissa Larsen PA-C    Anesthesia: General    Estimated Blood Loss (mL): Minimal    Complications: None    Specimens:   * No specimens in log *    Implants:  * No implants in log *      Drains: * No LDAs found *    Findings: hnp      Electronically signed by RUBEN TEJEDA MD on 1/15/2024 at 2:11 PM

## 2024-01-15 NOTE — PERIOP NOTE
Patient /Family /Designee has been informed that [facility] is not responsible for patient belongings per policy and the signed [facility] Patient Agreement document.  Personal items should be sent home or checked in with security.  Patient /Family /Designee selected the following action:                            [x]  Send personal items home with a family member or friend                                                 []  Check in personal items with security, excluding clothing                            []  Maintain personal items at the bedside, against recommendation                                 by Mario Alberto Allen [facility]                                   ** If patient /family /designee chooses to maintain personal items at the bedside,                                      Complete the patient belongings inventory in the EMR.   Belongings are with Bri castro.  Contact number: [phone]

## 2024-01-15 NOTE — DISCHARGE INSTRUCTIONS
Dr. Tejeda's Post-Operative Instructions Thoracic/Lumbar/Sacral Spine Surgery    DO NOT take any NSAIDS if you had Fusion Surgery.     ACTIVITIES:  *The first week after surgery   Change positions every hour while you are awake.  Walking is the best way to rebuild strength.   Activities around the house, such as washing dishes and preparing light meals are fine.   Avoid strenuous activities, such as vacuuming, and do not lift anything heavier than 1 gallon of milk (or about 5-8 pounds).   Do not bend over to  items from the ground level until 3 months post-op.    *Week 2 and beyond  You may gradually increase your activities, but avoid heavy lifting, pushing/pulling.   Walk at a pace that avoids fatigue or severe pain. Do not try to walk several blocks the first day! As you increase the distance, you may feel tired. If so, stop and rest.   Follow-up with Dr. Tejeda will be 2 weeks after surgery.    BATHING and INCISION CARE:  The incision may be tender or feel numb: this is normal.   Keep the incision clean and dry. You may shower 3 days after surgery. Cover the dressing with saran wrap before getting in the shower. The incision is closed with sutures under the skin and glue on top.   Do not apply any lotions, ointments or oils on the incision.   Do not remove the dressing. Your dressing will be changed at your first post op appointment. If it comes loose or is damaged, dirty or wet before this appointment, call your home health nurse (if you are being seen by a nurse at home) or the office to have the dressing changed.  If you notice any excessive swelling, redness, or persistent drainage around the incision, notify the office immediately.    CONSTIPATION:  Take a stool softener twice a day while you are taking a narcotic.   If you have not had a bowel movement within 3 days of surgery, you will need to use a laxative or suppository that can be obtained over the counter at your local pharmacy     ICE  Use

## 2024-01-15 NOTE — ANESTHESIA PRE PROCEDURE
Department of Anesthesiology  Preprocedure Note       Name:  Sky North Jr.   Age:  43 y.o.  :  1980                                          MRN:  [id number]         Date:  1/15/2024      Surgeon: Surgeon(s):  Ino Tejeda MD    Procedure: Procedure(s):  LEFT LUMBAR FOUR/FIVE, LUMBAR FIVE/SACRAL ONE LAMINECTOMY DISCECTOMY; C-ARM; 23 HR    Medications prior to admission:   Prior to Admission medications    Medication Sig Start Date End Date Taking? Authorizing Provider   tiaGABine (GABITRIL) 4 MG tablet Take 1 tablet by mouth 3 times daily  Patient not taking: Reported on 2023   Clement Nvaa MD       Current medications:    Current Facility-Administered Medications   Medication Dose Route Frequency Provider Last Rate Last Admin   • acetaminophen (TYLENOL) tablet 1,000 mg  1,000 mg Oral Once Ino Tejeda MD       • pregabalin (LYRICA) capsule 75 mg  75 mg Oral Once Ino Tejeda MD       • tamsulosin (FLOMAX) capsule 0.4 mg  0.4 mg Oral Once Ino Tejeda MD       • ceFAZolin (ANCEF) 2,000 mg in sterile water 20 mL IV syringe  2,000 mg IntraVENous Once Yulissa Larsen PA-C       • lidocaine PF 1 % injection 1 mL  1 mL IntraDERmal Once PRN Tena Taylor APRN - CRNA       • famotidine (PEPCID) tablet 20 mg  20 mg Oral Once Tena Taylor APRN - CRNA       • lactated ringers IV soln infusion   IntraVENous Continuous Tena Taylor APRN - CRNA       • celecoxib (CELEBREX) capsule 200 mg  200 mg Oral Once Tena Taylor APRN - CRNA       • acetaminophen (TYLENOL) tablet 1,000 mg  1,000 mg Oral Once Tena Taylor APRN - CRNA       • tranexamic acid (CYKLOKAPRON) 1,000 mg in sodium chloride 0.9 % 110 mL IVPB (mini-bag)  1,000 mg IntraVENous Once Ino Tejeda MD       • tranexamic acid (CYKLOKAPRON) 1,000 mg in sodium chloride 0.9 % 110 mL IVPB (mini-bag)  1,000 mg IntraVENous Once Ino Tejeda MD       • midazolam PF (VERSED) injection 2 mg  2 mg

## 2024-01-16 NOTE — OP NOTE
[facility] ([place], [place])  OPERATIVE REPORT    Name:  IZABEL JAUREGUI  MR#:   [id number]  :  1980  ACCOUNT #:  [id number]  DATE OF SERVICE:  01/15/2024    PREOPERATIVE DIAGNOSES:  Herniated nucleus pulposus, L4-5; herniated nucleus pulposus, L5-S1.    POSTOPERATIVE DIAGNOSES:  Herniated nucleus pulposus, L4-5; herniated nucleus pulposus, L5-S1.    PROCEDURES PERFORMED:  Left L4-5 hemilaminotomy, diskectomy; left L5-S1 hemilaminotomy, diskectomy.    SURGEON:  Ino Tejeda MD    ASSISTANT:  JOURDAN Bernal    ANESTHESIA:  General endotracheal.    COMPLICATIONS:  None.    SPECIMENS REMOVED:  None.    IMPLANTS:  None.    ESTIMATED BLOOD LOSS:  Minimal.    FINDINGS:  The patient had stenosis with disk herniations at both levels, L5-S1 being worse than L4-5.    DESCRIPTION OF OPERATION:  Following induction of general endotracheal anesthesia, the patient was turned into the prone position on spinal frame.  The patient was prepped and draped in usual fashion.  Midline incision was made.  A paramedian incision was made in lumbodorsal fascia and subperiosteal dissection done at L4-5 and L5-S1.  C-arm image verified our surgical level beginning at L5-S1.  Hemilaminotomy was done with minimal medial facetectomy, foraminotomy, resection of interval ligamentum flavum, isolation of the diskal mass, protection of the neural element, incision of the annulus, resection of the disk material until the disk was flat and stable.  The S1 and L5 root were free of compression.  Attention was then turned to L4-5.  The procedure was repeated.  A hemilaminotomy was done with small medial facetectomy and foraminotomy.  Resection of annular fragment with large nuclear component.  No further compression evident of the neural elements.  The disk appeared to be flat and stable.  Pledgets of Gelfoam and thrombin were placed over the laminotomy site.  The area was infiltrated with Marcaine with epinephrine solution for postoperative pain

## 2024-01-17 ENCOUNTER — TELEPHONE (OUTPATIENT)
Facility: HOSPITAL | Age: 44
End: 2024-01-17

## 2024-01-17 NOTE — TELEPHONE ENCOUNTER
Call placed to patient, ID verified x 2. Patient is s/p Left L4-5 hemilaminotomy, diskectomy; left L5-S1 hemilaminotomy, diskectomy with Dr. Tejeda, dos 01/15/2024. He denies chest pain, shortness of breath,nausea, vomiting, fever or chills. He denies any numbness or pain is his bilateral lower extremities, he denies any difficulty with bowel or bladder. He states that pain has been somewhat controlled but that it gets worse if he forgets to take his pain medication. Patient encouraged to take pain medication scheduled through today as this will be the worst day for pain postoperatively for him. Patient encouraged to ice 20 minutes every hour, not to be placed directly on his skin. He reports that he is ambulating hourly and that his dressing remains clean, dry and intact. Overall he feels he is doing well. He has  no questions or concerns at this time. He will follow up with Dr. Tejeda in two weeks or sooner if needed.

## (undated) DEVICE — WAX SURG 2.5GM HEMSTAT BNE BEESWAX PARAFFIN ISO PALMITATE

## (undated) DEVICE — GLOVE SURG SZ 85 L12IN FNGR THK79MIL GRN LTX FREE

## (undated) DEVICE — BANDAGE ADH W0.75XL3IN UNIV WVN FAB NAT GEN USE STRP N ADH

## (undated) DEVICE — SUTURE ABSORBABLE ANTIBACT 1-0 CT-1 24 IN STRATAFIX PDS + SXPP1A443

## (undated) DEVICE — 3.0MM PRECISION NEURO (MATCH HEAD)

## (undated) DEVICE — Device: Brand: MEDEX

## (undated) DEVICE — MARKER,SKIN,WI/RULER AND LABELS: Brand: MEDLINE

## (undated) DEVICE — MEDIA CONTRAST 10ML 200MG/ML 41%

## (undated) DEVICE — GLOVE SURG SZ 8 CRM LTX FREE POLYISOPRENE POLYMER BEAD ANTI

## (undated) DEVICE — WATERPROOF, BACTERIA PROOF DRESSING WITH ABSORBENT SEE THROUGH PAD: Brand: OPSITE POST-OP VISIBLE 15X10CM CTN 20

## (undated) DEVICE — SYR 10ML LUER LOK 1/5ML GRAD --

## (undated) DEVICE — SOLUTION IRRIG 1000ML 0.9% SOD CHL USP POUR PLAS BTL

## (undated) DEVICE — Device

## (undated) DEVICE — (D)BNDG ADHESIVE FABRIC 3/4X3 -- DISC BY MFR USE ITEM 357960

## (undated) DEVICE — SUTURE STRATAFIX SPRL MCRYL + SZ 2-0 L18IN ABSRB UD CT-1 SXMP1B413

## (undated) DEVICE — GLOVE SURG SZ 65 CRM LTX FREE POLYISOPRENE POLYMER BEAD ANTI

## (undated) DEVICE — SET EPI 18GA L3.5IN TUOHY NDL W/ 20GA CLS TIP NYL CATH

## (undated) DEVICE — ELECTRODE PT RET AD L9FT HI MOIST COND ADH HYDRGEL CORDED

## (undated) DEVICE — SUTURE MCRYL SZ 3-0 L27IN ABSRB UD L24MM PS-1 3/8 CIR PRIM Y936H

## (undated) DEVICE — GLOVE SURG SZ 65 L12IN FNGR THK79MIL GRN LTX FREE

## (undated) DEVICE — JACKSON TABLE POSITIONER KIT: Brand: MEDLINE INDUSTRIES, INC.

## (undated) DEVICE — NDL SPNE MANAN 22GX6IN --

## (undated) DEVICE — APPLICATOR MEDICATED 26 CC SOLUTION HI LT ORNG CHLORAPREP

## (undated) DEVICE — TRAY MYEL SFTY +

## (undated) DEVICE — KIT OR TURNOVER

## (undated) DEVICE — ADHESIVE SKIN CLOSURE WND 8.661X1.5 IN 22 CM LIQUIBAND SECUR